# Patient Record
Sex: FEMALE | Race: WHITE | NOT HISPANIC OR LATINO | Employment: OTHER | URBAN - METROPOLITAN AREA
[De-identification: names, ages, dates, MRNs, and addresses within clinical notes are randomized per-mention and may not be internally consistent; named-entity substitution may affect disease eponyms.]

---

## 2017-01-04 DIAGNOSIS — H01.139 EYELID DERMATITIS, ECZEMATOUS, UNSPECIFIED LATERALITY: Primary | ICD-10-CM

## 2017-01-10 RX ORDER — DESONIDE 0.5 MG/G
OINTMENT TOPICAL
Qty: 15 G | Refills: 3 | Status: SHIPPED | OUTPATIENT
Start: 2017-01-10 | End: 2017-03-21

## 2017-01-10 NOTE — TELEPHONE ENCOUNTER
Routing refill request to provider for review/approval because:  Follow up r/t eyelid dermatitis not noted on last office visit, unable to fill per protocol. Please advise.     CORI English, Clinical RN Sheila Clemons.

## 2017-02-01 ENCOUNTER — TELEPHONE (OUTPATIENT)
Dept: FAMILY MEDICINE | Facility: CLINIC | Age: 72
End: 2017-02-01

## 2017-02-01 ENCOUNTER — ANTICOAGULATION THERAPY VISIT (OUTPATIENT)
Dept: NURSING | Facility: CLINIC | Age: 72
End: 2017-02-01
Payer: COMMERCIAL

## 2017-02-01 DIAGNOSIS — I48.0 PAROXYSMAL ATRIAL FIBRILLATION (H): Primary | ICD-10-CM

## 2017-02-01 DIAGNOSIS — Z79.01 LONG-TERM (CURRENT) USE OF ANTICOAGULANTS: Primary | ICD-10-CM

## 2017-02-01 LAB — INR POINT OF CARE: 3 (ref 0.86–1.14)

## 2017-02-01 PROCEDURE — 85610 PROTHROMBIN TIME: CPT | Mod: QW

## 2017-02-01 PROCEDURE — 99207 ZZC NO CHARGE NURSE ONLY: CPT

## 2017-02-01 PROCEDURE — 36416 COLLJ CAPILLARY BLOOD SPEC: CPT

## 2017-02-01 NOTE — TELEPHONE ENCOUNTER
Please review and renew this patients INR referral, orders pending. Thank you!      Cheryl Dyer RN

## 2017-02-01 NOTE — PROGRESS NOTES
ANTICOAGULATION FOLLOW-UP CLINIC VISIT    Patient Name:  Jenn Joseph  Date:  2/1/2017  Contact Type:  Face to Face    SUBJECTIVE:     Patient Findings     Positives No Problem Findings           OBJECTIVE    INR PROTIME   Date Value Ref Range Status   02/01/2017 3.0* 0.86 - 1.14 Final       ASSESSMENT / PLAN  No question data found.  Anticoagulation Summary as of 2/1/2017     INR goal 2.0-3.0   Selected INR 3.0 (2/1/2017)   Maintenance plan 1 mg (1 mg x 1) on Sun, Tue, Thu; 2 mg (1 mg x 2) all other days   Full instructions 1 mg on Sun, Tue, Thu; 2 mg all other days   Weekly total 11 mg   No change documented Cheryl Dyer RN   Plan last modified Cheryl Dyer RN (4/13/2016)   Next INR check 3/15/2017   Target end date     Indications   Long-term (current) use of anticoagulants [Z79.01] [Z79.01]  Atrial fibrillation/flutter (Resolved)         Anticoagulation Episode Summary     INR check location     Preferred lab     Send INR reminders to [facility]    Comments       Anticoagulation Care Providers     Provider Role Specialty Phone number    Nayeli Lee PA-C Responsible Physician Assistant [phone]            See the Encounter Report to view Anticoagulation Flowsheet and Dosing Calendar (Go to Encounters tab in chart review, and find the Anticoagulation Therapy Visit)    Cheryl Dyer RN

## 2017-02-01 NOTE — MR AVS SNAPSHOT
Jenn JP Joseph   2/1/2017 2:00 PM   Anticoagulation Therapy Visit    Description:  71 year old female   Provider:  MIGUEL ANGEL DE LA CRUZ   Department:  [facility]           INR as of 2/1/2017     Selected INR 3.0 (2/1/2017)      Anticoagulation Summary as of 2/1/2017     INR goal 2.0-3.0   Selected INR 3.0 (2/1/2017)   Full instructions 1 mg on Sun, Tue, Thu; 2 mg all other days   Next INR check 3/15/2017    Indications   Long-term (current) use of anticoagulants [Z79.01] [Z79.01]  Atrial fibrillation/flutter (Resolved)         Your next Anticoagulation Clinic appointment(s)     Mar 15, 2017  2:00 PM   Anticoagulation Visit with MIGUEL ANGEL DE LA CRUZ   [facility]    [address] [place] [id number] [phone]              Contact Numbers     [facility]  Please call  [phone] to cancel and/or reschedule your appointment, or with any problems or questions regarding your therapy.        February 2017 Details    Sun Mon Tue Wed Thu Fri Sat        1      2 mg   See details      2      1 mg         3      2 mg         4      2 mg           5      1 mg         6      2 mg         7      1 mg         8      2 mg         9      1 mg         10      2 mg         11      2 mg           12      1 mg         13      2 mg         14      1 mg         15      2 mg         16      1 mg         17      2 mg         18      2 mg           19      1 mg         20      2 mg         21      1 mg         22      2 mg         23      1 mg         24      2 mg         25      2 mg           26      1 mg         27      2 mg         28      1 mg              Date Details   02/01 This INR check               How to take your warfarin dose     To take:  1 mg Take 1 of the 1 mg tablets.    To take:  2 mg Take 2 of the 1 mg tablets.           March 2017 Details    Sun Mon Tue Wed Thu Fri Sat        1      2 mg         2      1 mg         3      2 mg         4      2 mg            5      1 mg         6      2 mg         7      1 mg         8      2 mg         9      1 mg         10      2 mg         11      2 mg           12      1 mg         13      2 mg         14      1 mg         15            16               17               18                 19               20               21               22               23               24               25                 26               27               28               29               30               31                 Date Details   No additional details    Date of next INR:  3/15/2017         How to take your warfarin dose     To take:  1 mg Take 1 of the 1 mg tablets.    To take:  2 mg Take 2 of the 1 mg tablets.

## 2017-03-16 ENCOUNTER — ANTICOAGULATION THERAPY VISIT (OUTPATIENT)
Dept: NURSING | Facility: CLINIC | Age: 72
End: 2017-03-16
Payer: COMMERCIAL

## 2017-03-16 DIAGNOSIS — Z79.01 LONG-TERM (CURRENT) USE OF ANTICOAGULANTS: ICD-10-CM

## 2017-03-16 LAB — INR POINT OF CARE: 2.1 (ref 0.86–1.14)

## 2017-03-16 PROCEDURE — 99207 ZZC NO CHARGE NURSE ONLY: CPT

## 2017-03-16 PROCEDURE — 36416 COLLJ CAPILLARY BLOOD SPEC: CPT

## 2017-03-16 PROCEDURE — 85610 PROTHROMBIN TIME: CPT | Mod: QW

## 2017-03-16 NOTE — PROGRESS NOTES
ANTICOAGULATION FOLLOW-UP CLINIC VISIT    Patient Name:  Jenn Joseph  Date:  3/16/2017  Contact Type:  Face to Face    SUBJECTIVE:     Patient Findings     Positives No Problem Findings           OBJECTIVE    INR Protime   Date Value Ref Range Status   03/16/2017 2.1 (A) 0.86 - 1.14 Final       ASSESSMENT / PLAN  INR assessment THER    Recheck INR In: 6 WEEKS    INR Location Clinic      Anticoagulation Summary as of 3/16/2017     INR goal 2.0-3.0   Today's INR 2.1   Maintenance plan 1 mg (1 mg x 1) on Sun, Tue, Thu; 2 mg (1 mg x 2) all other days   Full instructions 1 mg on Sun, Tue, Thu; 2 mg all other days   Weekly total 11 mg   No change documented Cheryl Dyer RN   Plan last modified Cheryl Dyer RN (4/13/2016)   Next INR check 4/26/2017   Target end date     Indications   Long-term (current) use of anticoagulants [Z79.01] [Z79.01]  Atrial fibrillation/flutter (Resolved)         Anticoagulation Episode Summary     INR check location     Preferred lab     Send INR reminders to [facility]    Comments       Anticoagulation Care Providers     Provider Role Specialty Phone number    Nayeli Lee PA-C Responsible Physician Assistant [phone]            See the Encounter Report to view Anticoagulation Flowsheet and Dosing Calendar (Go to Encounters tab in chart review, and find the Anticoagulation Therapy Visit)    Cheryl Dyer RN

## 2017-03-16 NOTE — MR AVS SNAPSHOT
Jenn JP Jake   3/16/2017 2:00 PM   Anticoagulation Therapy Visit    Description:  72 year old female   Provider:  MIGUEL ANGEL DE LA CRUZ   Department:  [facility]           INR as of 3/16/2017     Today's INR 2.1      Anticoagulation Summary as of 3/16/2017     INR goal 2.0-3.0   Today's INR 2.1   Full instructions 1 mg on Sun, Tue, Thu; 2 mg all other days   Next INR check 4/26/2017    Indications   Long-term (current) use of anticoagulants [Z79.01] [Z79.01]  Atrial fibrillation/flutter (Resolved)         Your next Anticoagulation Clinic appointment(s)     Mar 16, 2017  2:00 PM CDT   Anticoagulation Visit with MIGUEL ANGEL DE LA CRUZ   [facility] ([facility])    [address] [address] [id number] [phone]            Apr 26, 2017  2:00 PM CDT   Anticoagulation Visit with MIGUEL ANGEL DE LA CRUZ   [facility] ([facility])    [address] [facility] [id number] [phone]              Contact Numbers     [facility]  Please call  [phone] to cancel and/or reschedule your appointment, or with any problems or questions regarding your therapy.        March 2017 Details    Sun Mon Tue Wed Thu Fri Sat        1               2               3               4                 5               6               7               8               9               10               11                 12               13               14               15               16      1 mg   See details      17      2 mg         18      2 mg           19      1 mg         20      2 mg         21      1 mg         22      2 mg         23      1 mg         24      2 mg         25      2 mg           26      1 mg         27      2 mg         28      1 mg         29      2 mg         30      1 mg         31      2 mg           Date Details   03/16 This INR check               How to take your warfarin dose     To take:  1 mg Take 1 of the 1 mg  tablets.    To take:  2 mg Take 2 of the 1 mg tablets.           April 2017 Details    Sun Mon Tue Wed Thu Fri Sat           1      2 mg           2      1 mg         3      2 mg         4      1 mg         5      2 mg         6      1 mg         7      2 mg         8      2 mg           9      1 mg         10      2 mg         11      1 mg         12      2 mg         13      1 mg         14      2 mg         15      2 mg           16      1 mg         17      2 mg         18      1 mg         19      2 mg         20      1 mg         21      2 mg         22      2 mg           23      1 mg         24      2 mg         25      1 mg         26            27               28               29                 30                      Date Details   No additional details    Date of next INR:  4/26/2017         How to take your warfarin dose     To take:  1 mg Take 1 of the 1 mg tablets.    To take:  2 mg Take 2 of the 1 mg tablets.

## 2017-03-21 ENCOUNTER — MYC MEDICAL ADVICE (OUTPATIENT)
Dept: FAMILY MEDICINE | Facility: CLINIC | Age: 72
End: 2017-03-21

## 2017-03-21 DIAGNOSIS — Z79.01 LONG-TERM (CURRENT) USE OF ANTICOAGULANTS, INR GOAL 2.0-3.0: ICD-10-CM

## 2017-03-21 DIAGNOSIS — I48.20 CHRONIC ATRIAL FIBRILLATION (H): ICD-10-CM

## 2017-03-21 DIAGNOSIS — H01.139 EYELID DERMATITIS, ECZEMATOUS, UNSPECIFIED LATERALITY: ICD-10-CM

## 2017-03-21 RX ORDER — DESONIDE 0.5 MG/G
OINTMENT TOPICAL
Qty: 15 G | Refills: 3 | Status: SHIPPED | OUTPATIENT
Start: 2017-03-21 | End: 2023-04-21

## 2017-03-21 RX ORDER — WARFARIN SODIUM 1 MG/1
TABLET ORAL
Qty: 140 TABLET | Refills: 1 | Status: SHIPPED | OUTPATIENT
Start: 2017-03-21 | End: 2017-09-05

## 2017-04-26 ENCOUNTER — ANTICOAGULATION THERAPY VISIT (OUTPATIENT)
Dept: NURSING | Facility: CLINIC | Age: 72
End: 2017-04-26
Payer: COMMERCIAL

## 2017-04-26 DIAGNOSIS — Z79.01 LONG-TERM (CURRENT) USE OF ANTICOAGULANTS: ICD-10-CM

## 2017-04-26 LAB — INR POINT OF CARE: 3 (ref 0.86–1.14)

## 2017-04-26 PROCEDURE — 85610 PROTHROMBIN TIME: CPT | Mod: QW

## 2017-04-26 PROCEDURE — 99207 ZZC NO CHARGE NURSE ONLY: CPT

## 2017-04-26 PROCEDURE — 36416 COLLJ CAPILLARY BLOOD SPEC: CPT

## 2017-04-26 NOTE — PROGRESS NOTES
ANTICOAGULATION FOLLOW-UP CLINIC VISIT    Patient Name:  Jenn Joseph  Date:  4/26/2017  Contact Type:  Face to Face    SUBJECTIVE:     Patient Findings     Positives No Problem Findings           OBJECTIVE    INR Protime   Date Value Ref Range Status   04/26/2017 3.0 (A) 0.86 - 1.14 Final       ASSESSMENT / PLAN  INR assessment THER    Recheck INR In: 6 WEEKS    INR Location Clinic      Anticoagulation Summary as of 4/26/2017     INR goal 2.0-3.0   Today's INR 3.0   Maintenance plan 1 mg (1 mg x 1) on Sun, Tue, Thu; 2 mg (1 mg x 2) all other days   Full instructions 1 mg on Sun, Tue, Thu; 2 mg all other days   Weekly total 11 mg   No change documented Cheryl Dyer RN   Plan last modified Cheryl Dyer RN (4/13/2016)   Next INR check 6/7/2017   Target end date     Indications   Long-term (current) use of anticoagulants [Z79.01] [Z79.01]  Atrial fibrillation/flutter (Resolved)         Anticoagulation Episode Summary     INR check location     Preferred lab     Send INR reminders to [facility]    Comments       Anticoagulation Care Providers     Provider Role Specialty Phone number    Nayeli Lee PA-C Responsible Physician Assistant [phone]            See the Encounter Report to view Anticoagulation Flowsheet and Dosing Calendar (Go to Encounters tab in chart review, and find the Anticoagulation Therapy Visit)    Cheryl Dyer RN

## 2017-04-26 NOTE — MR AVS SNAPSHOT
Jenncarmela Joseph   4/26/2017 2:00 PM   Anticoagulation Therapy Visit    Description:  72 year old female   Provider:  MIGUEL ANGEL DE LA CRUZ   Department:  [facility]           INR as of 4/26/2017     Today's INR 3.0      Anticoagulation Summary as of 4/26/2017     INR goal 2.0-3.0   Today's INR 3.0   Full instructions 1 mg on Sun, Tue, Thu; 2 mg all other days   Next INR check 6/7/2017    Indications   Long-term (current) use of anticoagulants [Z79.01] [Z79.01]  Atrial fibrillation/flutter (Resolved)         Your next Anticoagulation Clinic appointment(s)     Apr 26, 2017  2:00 PM CDT   Anticoagulation Visit with MIGUEL ANGEL DE LA CRUZ   [facility] ([facility])    [address] [address] [id number] [phone]            Jun 07, 2017  2:00 PM CDT   Anticoagulation Visit with MIGUEL ANGEL DE LA CRUZ   [facility] ([facility])    [address] [address] [id number] [phone]              Contact Numbers     [facility]  Please call  [phone] to cancel and/or reschedule your appointment, or with any problems or questions regarding your therapy.        April 2017 Details    Sun Mon Tue Wed Thu Fri Sat           1                 2               3               4               5               6               7               8                 9               10               11               12               13               14               15                 16               17               18               19               20               21               22                 23               24               25               26      2 mg   See details      27      1 mg         28      2 mg         29      2 mg           30      1 mg                Date Details   04/26 This INR check               How to take your warfarin dose     To take:  1 mg Take 1 of the 1 mg tablets.    To take:  2 mg Take 2 of the 1 mg  tablets.           May 2017 Details    Sun Mon Tue Wed Thu Fri Sat      1      2 mg         2      1 mg         3      2 mg         4      1 mg         5      2 mg         6      2 mg           7      1 mg         8      2 mg         9      1 mg         10      2 mg         11      1 mg         12      2 mg         13      2 mg           14      1 mg         15      2 mg         16      1 mg         17      2 mg         18      1 mg         19      2 mg         20      2 mg           21      1 mg         22      2 mg         23      1 mg         24      2 mg         25      1 mg         26      2 mg         27      2 mg           28      1 mg         29      2 mg         30      1 mg         31      2 mg             Date Details   No additional details            How to take your warfarin dose     To take:  1 mg Take 1 of the 1 mg tablets.    To take:  2 mg Take 2 of the 1 mg tablets.           June 2017 Details    Sun Mon Tue Wed Thu Fri Sat         1      1 mg         2      2 mg         3      2 mg           4      1 mg         5      2 mg         6      1 mg         7            8               9               10                 11               12               13               14               15               16               17                 18               19               20               21               22               23               24                 25               26               27               28               29               30                 Date Details   No additional details    Date of next INR:  6/7/2017         How to take your warfarin dose     To take:  1 mg Take 1 of the 1 mg tablets.    To take:  2 mg Take 2 of the 1 mg tablets.

## 2017-05-10 ENCOUNTER — TRANSFERRED RECORDS (OUTPATIENT)
Dept: HEALTH INFORMATION MANAGEMENT | Facility: CLINIC | Age: 72
End: 2017-05-10

## 2017-06-08 ENCOUNTER — ANTICOAGULATION THERAPY VISIT (OUTPATIENT)
Dept: NURSING | Facility: CLINIC | Age: 72
End: 2017-06-08
Payer: COMMERCIAL

## 2017-06-08 DIAGNOSIS — Z79.01 LONG-TERM (CURRENT) USE OF ANTICOAGULANTS: ICD-10-CM

## 2017-06-08 LAB — INR POINT OF CARE: 2.2 (ref 0.86–1.14)

## 2017-06-08 PROCEDURE — 36416 COLLJ CAPILLARY BLOOD SPEC: CPT

## 2017-06-08 PROCEDURE — 85610 PROTHROMBIN TIME: CPT | Mod: QW

## 2017-06-08 PROCEDURE — 99207 ZZC NO CHARGE NURSE ONLY: CPT

## 2017-06-08 NOTE — PROGRESS NOTES
ANTICOAGULATION FOLLOW-UP CLINIC VISIT    Patient Name:  Jenn Joseph  Date:  6/8/2017  Contact Type:  Face to Face    SUBJECTIVE:     Patient Findings     Positives No Problem Findings           OBJECTIVE    INR Protime   Date Value Ref Range Status   06/08/2017 2.2 (A) 0.86 - 1.14 Final       ASSESSMENT / PLAN  INR assessment THER    Recheck INR In: 6 WEEKS    INR Location Clinic      Anticoagulation Summary as of 6/8/2017     INR goal 2.0-3.0   Today's INR 2.2   Maintenance plan 1 mg (1 mg x 1) on Sun, Tue, Thu; 2 mg (1 mg x 2) all other days   Full instructions 1 mg on Sun, Tue, Thu; 2 mg all other days   Weekly total 11 mg   No change documented Dennise Myers RN   Plan last modified Cheryl Dyer RN (4/13/2016)   Next INR check 7/19/2017   Target end date     Indications   Long-term (current) use of anticoagulants [Z79.01] [Z79.01]  Atrial fibrillation/flutter (Resolved)         Anticoagulation Episode Summary     INR check location     Preferred lab     Send INR reminders to [facility]    Comments       Anticoagulation Care Providers     Provider Role Specialty Phone number    Nayeli Lee PA-C Responsible Physician Assistant [phone]            See the Encounter Report to view Anticoagulation Flowsheet and Dosing Calendar (Go to Encounters tab in chart review, and find the Anticoagulation Therapy Visit)        Dennise Myers RN

## 2017-06-08 NOTE — MR AVS SNAPSHOT
Jenn TRAMMELL Jake   6/8/2017 9:20 AM   Anticoagulation Therapy Visit    Description:  72 year old female   Provider:  MIGUEL ANGEL DE LA CRUZ   Department:  [facility]           INR as of 6/8/2017     Today's INR 2.2      Anticoagulation Summary as of 6/8/2017     INR goal 2.0-3.0   Today's INR 2.2   Full instructions 1 mg on Sun, Tue, Thu; 2 mg all other days   Next INR check 7/19/2017    Indications   Long-term (current) use of anticoagulants [Z79.01] [Z79.01]  Atrial fibrillation/flutter (Resolved)         Your next Anticoagulation Clinic appointment(s)     Jun 08, 2017  9:20 AM CDT   Anticoagulation Visit with MIGUEL ANGEL DE LA CRUZ   [facility]    [address] [facility] [id number] [phone]            Jul 19, 2017  2:00 PM CDT   Anticoagulation Visit with MIGUEL ANGEL DE LA CRUZ   [facility]    [address] [facility] [phone] [phone]              Contact Numbers     [facility]  Please call  [phone] to cancel and/or reschedule your appointment, or with any problems or questions regarding your therapy.        June 2017 Details    Sun Mon Tue Wed Thu Fri Sat         1               2               3                 4               5               6               7               8      1 mg   See details      9      2 mg         10      2 mg           11      1 mg         12      2 mg         13      1 mg         14      2 mg         15      1 mg         16      2 mg         17      2 mg           18      1 mg         19      2 mg         20      1 mg         21      2 mg         22      1 mg         23      2 mg         24      2 mg           25      1 mg         26      2 mg         27      1 mg         28      2 mg         29      1 mg         30      2 mg           Date Details   06/08 This INR check               How to take your warfarin dose     To take:  1 mg Take 1 of  the 1 mg tablets.    To take:  2 mg Take 2 of the 1 mg tablets.           July 2017 Details    Sun Mon Tue Wed Thu Fri Sat           1      2 mg           2      1 mg         3      2 mg         4      1 mg         5      2 mg         6      1 mg         7      2 mg         8      2 mg           9      1 mg         10      2 mg         11      1 mg         12      2 mg         13      1 mg         14      2 mg         15      2 mg           16      1 mg         17      2 mg         18      1 mg         19            20               21               22                 23               24               25               26               27               28               29                 30               31                     Date Details   No additional details    Date of next INR:  7/19/2017         How to take your warfarin dose     To take:  1 mg Take 1 of the 1 mg tablets.    To take:  2 mg Take 2 of the 1 mg tablets.

## 2017-07-19 ENCOUNTER — ANTICOAGULATION THERAPY VISIT (OUTPATIENT)
Dept: NURSING | Facility: CLINIC | Age: 72
End: 2017-07-19
Payer: COMMERCIAL

## 2017-07-19 DIAGNOSIS — Z79.01 LONG-TERM (CURRENT) USE OF ANTICOAGULANTS: ICD-10-CM

## 2017-07-19 LAB — INR POINT OF CARE: 2.8 (ref 0.86–1.14)

## 2017-07-19 PROCEDURE — 85610 PROTHROMBIN TIME: CPT | Mod: QW

## 2017-07-19 PROCEDURE — 99207 ZZC NO CHARGE NURSE ONLY: CPT

## 2017-07-19 PROCEDURE — 36416 COLLJ CAPILLARY BLOOD SPEC: CPT

## 2017-07-19 NOTE — MR AVS SNAPSHOT
Jenncarmela Joseph   7/19/2017 2:00 PM   Anticoagulation Therapy Visit    Description:  72 year old female   Provider:  MIGUEL ANGEL DE LA CRUZ   Department:  [facility]           INR as of 7/19/2017     Today's INR       Anticoagulation Summary as of 7/19/2017     INR goal 2.0-3.0   Today's INR    Full instructions 1 mg on Sun, Tue, Thu; 2 mg all other days   Next INR check 8/30/2017    Indications   Long-term (current) use of anticoagulants [Z79.01] [Z79.01]  Atrial fibrillation/flutter (Resolved)         Your next Anticoagulation Clinic appointment(s)     Jul 19, 2017  2:00 PM CDT   Anticoagulation Visit with MIGUEL ANGEL DE LA CRUZ   [facility]    [address] [facility] [id number] [phone]            Aug 30, 2017  2:00 PM CDT   Anticoagulation Visit with MIGUEL ANGEL DE LA CRUZ   [facility]    [address] [facility] [id number] [phone]              Contact Numbers     [facility]  Please call  [phone] to cancel and/or reschedule your appointment, or with any problems or questions regarding your therapy.        July 2017 Details    Sun Mon Tue Wed Thu Fri Sat           1                 2               3               4               5               6               7               8                 9               10               11               12               13               14               15                 16               17               18               19      2 mg   See details      20      1 mg         21      2 mg         22      2 mg           23      1 mg         24      2 mg         25      1 mg         26      2 mg         27      1 mg         28      2 mg         29      2 mg           30      1 mg         31      2 mg               Date Details   07/19 This INR check               How to take your warfarin dose     To take:  1 mg Take 1 of the 1 mg tablets.     To take:  2 mg Take 2 of the 1 mg tablets.           August 2017 Details    Sun Mon Tue Wed Thu Fri Sat       1      1 mg         2      2 mg         3      1 mg         4      2 mg         5      2 mg           6      1 mg         7      2 mg         8      1 mg         9      2 mg         10      1 mg         11      2 mg         12      2 mg           13      1 mg         14      2 mg         15      1 mg         16      2 mg         17      1 mg         18      2 mg         19      2 mg           20      1 mg         21      2 mg         22      1 mg         23      2 mg         24      1 mg         25      2 mg         26      2 mg           27      1 mg         28      2 mg         29      1 mg         30            31                  Date Details   No additional details    Date of next INR:  8/30/2017         How to take your warfarin dose     To take:  1 mg Take 1 of the 1 mg tablets.    To take:  2 mg Take 2 of the 1 mg tablets.

## 2017-07-19 NOTE — PROGRESS NOTES
ANTICOAGULATION FOLLOW-UP CLINIC VISIT    Patient Name:  Jenn Joseph  Date:  7/19/2017  Contact Type:  Face to Face    SUBJECTIVE:     Patient Findings     Positives No Problem Findings           OBJECTIVE    INR Protime   Date Value Ref Range Status   06/08/2017 2.2 (A) 0.86 - 1.14 Final       ASSESSMENT / PLAN  INR assessment THER    Recheck INR In: 6 WEEKS    INR Location Clinic      Anticoagulation Summary as of 7/19/2017     INR goal 2.0-3.0   Today's INR    Maintenance plan 1 mg (1 mg x 1) on Sun, Tue, Thu; 2 mg (1 mg x 2) all other days   Full instructions 1 mg on Sun, Tue, Thu; 2 mg all other days   Weekly total 11 mg   No change documented Cheryl Dyer RN   Plan last modified Cheryl Dyer RN (4/13/2016)   Next INR check 8/30/2017   Target end date     Indications   Long-term (current) use of anticoagulants [Z79.01] [Z79.01]  Atrial fibrillation/flutter (Resolved)         Anticoagulation Episode Summary     INR check location     Preferred lab     Send [facility] reminders to  [facility]    Comments       Anticoagulation Care Providers     Provider Role Specialty Phone number    Nayeli Lee PA-C Responsible Physician Assistant [phone]            See the Encounter Report to view Anticoagulation Flowsheet and Dosing Calendar (Go to Encounters tab in chart review, and find the Anticoagulation Therapy Visit)      Cheryl Dyer RN

## 2017-07-31 ENCOUNTER — OFFICE VISIT (OUTPATIENT)
Dept: FAMILY MEDICINE | Facility: CLINIC | Age: 72
End: 2017-07-31
Payer: COMMERCIAL

## 2017-07-31 VITALS
HEART RATE: 61 BPM | DIASTOLIC BLOOD PRESSURE: 68 MMHG | HEIGHT: 62 IN | WEIGHT: 138.4 LBS | BODY MASS INDEX: 25.47 KG/M2 | SYSTOLIC BLOOD PRESSURE: 120 MMHG | TEMPERATURE: 96.5 F | OXYGEN SATURATION: 95 %

## 2017-07-31 DIAGNOSIS — Z79.01 LONG-TERM (CURRENT) USE OF ANTICOAGULANTS, INR GOAL 2.0-3.0: ICD-10-CM

## 2017-07-31 DIAGNOSIS — K21.9 GASTROESOPHAGEAL REFLUX DISEASE WITHOUT ESOPHAGITIS: Primary | ICD-10-CM

## 2017-07-31 DIAGNOSIS — I48.0 PAROXYSMAL ATRIAL FIBRILLATION (H): ICD-10-CM

## 2017-07-31 PROCEDURE — 99213 OFFICE O/P EST LOW 20 MIN: CPT | Performed by: PHYSICIAN ASSISTANT

## 2017-07-31 ASSESSMENT — PAIN SCALES - GENERAL: PAINLEVEL: NO PAIN (0)

## 2017-07-31 NOTE — MR AVS SNAPSHOT
After Visit Summary   7/31/2017    Jenn Joseph    MRN: [id number]           Patient Information     Date Of Birth          1945        Visit Information        Provider Department      7/31/2017 10:20 AM Nayeli Lee PA-C [facility]        Today's Diagnoses     Gastroesophageal reflux disease without esophagitis    -  1    Long-term (current) use of anticoagulants, INR goal 2.0-3.0        Paroxysmal atrial fibrillation (H)           Follow-ups after your visit        Your next 10 appointments already scheduled     Aug 30, 2017  2:00 PM CDT   Anticoagulation Visit with TERESITA DE LA CRUZ   [facility] ([facility])    [address] [place] [id number] [phone]              Who to contact     If you have questions or need follow up information about today's clinic visit or your schedule please contact [facility] directly at [phone].  Normal or non-critical lab and imaging results will be communicated to you by [facility], letter or phone within 4 business days after the clinic has received the results. If you do not hear from us within 7 days, please contact the clinic through [employer] or phone. If you have a critical or abnormal lab result, we will notify you by phone as soon as possible.  Submit refill requests through [employer] or call your pharmacy and they will forward the refill request to us. Please allow 3 business days for your refill to be completed.          Additional Information About Your Visit        MyChart Information     [employer] gives you secure access to your electronic health record. If you see a primary care provider, you can also send messages to your care team and make appointments. If you have questions, please call your primary care clinic.  If you do not have a primary care provider, please call [phone] and they will assist you.        Care EveryWhere ID      "This is your Care EveryWhere ID. This could be used by other organizations to access your [facility] medical records  [id number]        Your Vitals Were     Pulse Temperature Height Pulse Oximetry Breastfeeding? BMI (Body Mass Index)    61 96.5  F (35.8  C) (Oral) 5' 2.44\" (1.586 m) 95% No 24.96 kg/m2       Blood Pressure from Last 3 Encounters:   07/31/17 120/68   09/19/16 138/80   12/14/15 119/77    Weight from Last 3 Encounters:   07/31/17 138 lb 6.4 oz (62.8 kg)   09/19/16 142 lb (64.4 kg)   12/23/15 145 lb 8 oz (66 kg)              Today, you had the following     No orders found for display       Primary Care Provider Office Phone # Fax #    Nayeli Lee PA-C [phone]       [facility] [address]  [facility] [address]        Equal Access to Services     JUAN CARLOS FORTUNE : Hadii aad ku hadasho Soomaali, waaxda luqadaha, qaybta kaalmada adeegyada, waxay idiin hayaan audrey kharash agusto . So [facility] [phone].    ATENCIÓN: Si habla español, tiene a williamson disposición servicios gratuitos de asistencia lingüística. [facility] [phone].    We comply with applicable federal civil rights laws and [place] laws. We do not discriminate on the basis of race, color, national origin, age, disability sex, sexual orientation or gender identity.            Thank you!     Thank you for choosing [facility]  for your care. Our goal is always to provide you with excellent care. Hearing back from our patients is one way we can continue to improve our services. Please take a few minutes to complete the written survey that you may receive in the mail after your visit with us. Thank you!             Your Updated Medication List - Protect others around you: Learn how to safely use, store and throw away your medicines at www.disposemymeds.org.          This list is accurate as of: 7/31/17 11:00 AM.  Always use your most recent med list.                   Brand Name Dispense " Instructions for use Diagnosis    atenolol 12.5 mg Tabs half-tab    TENORMIN     Take 12.5 mg by mouth daily. Half tablet daily    Chronic atrial fibrillation (H)       Calcium-Magnesium-Zinc 333-133-5 MG Tabs per tablet      Take  by mouth. 3-4 daily        Co Q-10 200 MG Caps     90 capsule    Take 200 mg by mouth daily    Routine general medical examination at a health care facility       desonide 0.05 % ointment    DESOWEN    15 g    Apply small amount twice daily to the eyelids for up to two weeks at a time. Take caution to not get it in the eyes.    Eyelid dermatitis, eczematous, unspecified laterality       lisinopril 5 MG tablet    PRINIVIL/ZESTRIL     1 TABLET DAILY        loratadine 10 MG capsule      Take 10 mg by mouth as needed.        MULTIVITAL PO           order for DME     1 each    Equipment being ordered: SP boot walker.    Left foot pain       PRAVACHOL PO      Take 20 mg by mouth daily    Chronic atrial fibrillation (H)       probiotic Caps      Take  by mouth. 4 times weekly        ranitidine 150 MG tablet    ZANTAC    180 tablet    Take 1 tablet (150 mg) by mouth 2 times daily as needed for heartburn    Gastroesophageal reflux disease with esophagitis       RYTHMOL  MG 12 hr capsule   Generic drug:  propafenone      Take 425 mg by mouth 2 times daily.        TUMS PO      Take  by mouth. prn        vitamin D 1000 UNITS capsule      Take 1 capsule by mouth daily.        warfarin 1 MG tablet    COUMADIN    140 tablet    Take 1 tablet (1mg) Tuesdays Thursdays Sundays and take 2 tablets (2 mg) other 4 days of week or as directed by [facility] nurse    Long-term (current) use of anticoagulants, INR goal 2.0-3.0, Chronic atrial fibrillation (H)

## 2017-07-31 NOTE — NURSING NOTE
"Chief Complaint   Patient presents with     Consult       Initial /77 (BP Location: Left arm, Patient Position: Sitting, Cuff Size: Adult Regular)  Pulse 61  Temp 96.5  F (35.8  C) (Oral)  Ht 5' 2.44\" (1.586 m)  Wt 138 lb 6.4 oz (62.8 kg)  SpO2 95%  Breastfeeding? No  BMI 24.96 kg/m2 Estimated body mass index is 24.96 kg/(m^2) as calculated from the following:    Height as of this encounter: 5' 2.44\" (1.586 m).    Weight as of this encounter: 138 lb 6.4 oz (62.8 kg).  Medication Reconciliation: leisa LOPEZ    "

## 2017-07-31 NOTE — PROGRESS NOTES
SUBJECTIVE:                                                    Jenn Joseph is a 72 year old female who presents to clinic today for the following health issues:      Consultation (to establish care with a new provider as PCP will be transferring soon).     Med recheck:    GERD:  Stable on zantac.     A fib:  Stable and managed by cardiology and INR clinic.     HTN:  At goal.    Advanced care directive:  On file and no changes desired at this time.         Problem list and histories reviewed & adjusted, as indicated.  Additional history: as documented    Patient Active Problem List   Diagnosis     GERD (gastroesophageal reflux disease)     CARDIOVASCULAR SCREENING; LDL GOAL LESS THAN 130     Hypertension goal BP (blood pressure) < 140/90     Long-term (current) use of anticoagulants, INR goal 2.0-3.0     Advanced directives, counseling/discussion     CKD (chronic kidney disease) stage 3, GFR 30-59 ml/min     Health Care Home     Seborrhea     Eyelid dermatitis, eczematous     Uterine prolapse     Paroxysmal atrial fibrillation (H)     Past Surgical History:   Procedure Laterality Date     D & C  80'S     HYSTERECTOMY  11-5-15     ROTATOR CUFF REPAIR RT/LT  2007    LEFT     TUBAL LIGATION  80'S       Social History   Substance Use Topics     Smoking status: Former Smoker     Smokeless tobacco: Never Used     Alcohol use Yes      Comment: 1 - 2 PER WEEK     Family History   Problem Relation Age of Onset     Hypertension Mother      CEREBROVASCULAR DISEASE Mother      Thyroid Disease Mother      CEREBROVASCULAR DISEASE Maternal Grandfather      Hypertension Brother      Musculoskeletal Disorder Brother      FIBROMYALGIA     HEART DISEASE Brother      Cardiovascular Brother      ATRIAL FIB     Musculoskeletal Disorder Sister      FIBROMYALGIA     Thyroid Disease Sister      Allergies Son      HEART DISEASE Brother      Cardiovascular Brother      HEART DISEASE Brother      Cardiovascular Brother      HEART  DISEASE Brother      Cardiovascular Brother      HEART DISEASE Father      Cardiovascular Brother      ATRIAL FIB     Cardiovascular Brother      ATRIAL FIB     Cardiovascular Brother      ATRIAL FIB     Cardiovascular Brother      ATRIAL FIB         Current Outpatient Prescriptions   Medication Sig Dispense Refill     desonide (DESOWEN) 0.05 % ointment Apply small amount twice daily to the eyelids for up to two weeks at a time. Take caution to not get it in the eyes. 15 g 3     warfarin (COUMADIN) 1 MG tablet Take 1 tablet (1mg) Tuesdays Thursdays Sundays and take 2 tablets (2 mg) other 4 days of week or as directed by [facility] nurse 140 tablet 1     Pravastatin Sodium (PRAVACHOL PO) Take 20 mg by mouth daily       ranitidine (ZANTAC) 150 MG tablet Take 1 tablet (150 mg) by mouth 2 times daily as needed for heartburn 180 tablet 3     Multiple Vitamins-Minerals (MULTIVITAL PO)        Coenzyme Q10 (CO Q-10) 200 MG CAPS Take 200 mg by mouth daily 90 capsule 3     Cholecalciferol (VITAMIN D) 1000 UNITS capsule Take 1 capsule by mouth daily.       probiotic CAPS Take  by mouth. 4 times weekly       atenolol (TENORMIN) 12.5 MG TABS Take 12.5 mg by mouth daily. Half tablet daily       Calcium-Magnesium-Zinc 333-133-5 MG TABS Take  by mouth. 3-4 daily       Calcium Carbonate Antacid (TUMS PO) Take  by mouth. prn       propafenone (RYTHMOL SR) 425 MG 12 hr capsule Take 425 mg by mouth 2 times daily.       LISINOPRIL 5 MG OR TABS 1 TABLET DAILY       order for DME Equipment being ordered: SP boot walker. 1 each 0     Loratadine 10 MG capsule Take 10 mg by mouth as needed.       BP Readings from Last 3 Encounters:   07/31/17 120/68   09/19/16 138/80   12/14/15 119/77    Wt Readings from Last 3 Encounters:   07/31/17 138 lb 6.4 oz (62.8 kg)   09/19/16 142 lb (64.4 kg)   12/23/15 145 lb 8 oz (66 kg)                        Reviewed and updated as needed this visit by clinical staffTobacco  Allergies       Reviewed and updated as  "needed this visit by Provider         ROS:  Constitutional, HEENT, cardiovascular, pulmonary, gi and gu systems are negative, except as otherwise noted.      OBJECTIVE:   /68  Pulse 61  Temp 96.5  F (35.8  C) (Oral)  Ht 5' 2.44\" (1.586 m)  Wt 138 lb 6.4 oz (62.8 kg)  SpO2 95%  Breastfeeding? No  BMI 24.96 kg/m2  Body mass index is 24.96 kg/(m^2).  GENERAL: healthy, alert and no distress    Diagnostic Test Results:  none     ASSESSMENT/PLAN:         1. Gastroesophageal reflux disease without esophagitis  Stable on zantac.     2. Long-term (current) use of anticoagulants, INR goal 2.0-3.0  Managed by INR clinic    3. Paroxysmal atrial fibrillation (H)  Managed by cardiologist.       FUTURE APPOINTMENTS:       - Follow-up visit in 1-2 months for her physical (will establish care with a new provider at that time).     Nayeli Lee PA-C  [facility]  "

## 2017-09-05 DIAGNOSIS — I48.20 CHRONIC ATRIAL FIBRILLATION (H): ICD-10-CM

## 2017-09-05 DIAGNOSIS — Z79.01 LONG-TERM (CURRENT) USE OF ANTICOAGULANTS, INR GOAL 2.0-3.0: ICD-10-CM

## 2017-09-06 ENCOUNTER — ANTICOAGULATION THERAPY VISIT (OUTPATIENT)
Dept: NURSING | Facility: CLINIC | Age: 72
End: 2017-09-06
Payer: COMMERCIAL

## 2017-09-06 LAB — INR POINT OF CARE: 2.7 (ref 0.86–1.14)

## 2017-09-06 PROCEDURE — 36416 COLLJ CAPILLARY BLOOD SPEC: CPT

## 2017-09-06 PROCEDURE — 85610 PROTHROMBIN TIME: CPT | Mod: QW

## 2017-09-06 PROCEDURE — 99207 ZZC NO CHARGE NURSE ONLY: CPT

## 2017-09-06 RX ORDER — WARFARIN SODIUM 1 MG/1
TABLET ORAL
Qty: 140 TABLET | Refills: 0 | Status: SHIPPED | OUTPATIENT
Start: 2017-09-06 | End: 2017-12-11

## 2017-09-06 NOTE — PROGRESS NOTES
ANTICOAGULATION FOLLOW-UP CLINIC VISIT    Patient Name:  Jenn Joseph  Date:  9/6/2017  Contact Type:  Face to Face    SUBJECTIVE:     Patient Findings     Positives No Problem Findings           OBJECTIVE    INR Protime   Date Value Ref Range Status   09/06/2017 2.7 (A) 0.86 - 1.14 Final       ASSESSMENT / PLAN  INR assessment THER    Recheck INR In: 6 WEEKS    INR Location Clinic      Anticoagulation Summary as of 9/6/2017     INR goal 2.0-3.0   Today's INR 2.7   Maintenance plan 1 mg (1 mg x 1) on Sun, Tue, Thu; 2 mg (1 mg x 2) all other days   Full instructions 1 mg on Sun, Tue, Thu; 2 mg all other days   Weekly total 11 mg   No change documented Kim Zhang RN   Plan last modified Cheryl Dyer RN (4/13/2016)   Next INR check 10/18/2017   Target end date     Indications   Long-term (current) use of anticoagulants [Z79.01] (Resolved) [Z79.01]  Atrial fibrillation/flutter (Resolved)         Anticoagulation Episode Summary     INR check location     Preferred lab     Send INR reminders to  [facility]    Comments       Anticoagulation Care Providers     Provider Role Specialty Phone number    J LuisNayeli ott PA-C Responsible Physician Assistant [phone]            See the Encounter Report to view Anticoagulation Flowsheet and Dosing Calendar (Go to Encounters tab in chart review, and find the Anticoagulation Therapy Visit)        Kim Zhang, RD

## 2017-09-06 NOTE — TELEPHONE ENCOUNTER
Prescription approved per [facility] Refill Protocol.      Kim Zhang RN, [facility]

## 2017-09-06 NOTE — TELEPHONE ENCOUNTER
warfarin     Last Written Prescription Date: 3/21/17  Last Fill Qty: 140, # refills: 1  Last Office Visit with [facility] [place], P or [facility] prescribing provider: 7/31/17       Date and Result of Last PT/INR:   Lab Results   Component Value Date    INR 2.8 07/19/2017    INR 2.2 06/08/2017    INR 1.80 11/13/2009

## 2017-10-05 ENCOUNTER — DOCUMENTATION ONLY (OUTPATIENT)
Dept: LAB | Facility: CLINIC | Age: 72
End: 2017-10-05

## 2017-10-05 DIAGNOSIS — I10 HYPERTENSION GOAL BP (BLOOD PRESSURE) < 140/90: Primary | ICD-10-CM

## 2017-10-05 NOTE — PROGRESS NOTES
This patient has a lab only appointment on 10/12/2017 but does not have future orders. Please review, associate diagnosis and sign pending lab orders for the upcoming appointment.  She has an appointment with you on 10/17/2017.    Thank you,    [facility]

## 2017-10-12 DIAGNOSIS — I10 HYPERTENSION GOAL BP (BLOOD PRESSURE) < 140/90: ICD-10-CM

## 2017-10-12 LAB
ANION GAP SERPL CALCULATED.3IONS-SCNC: 7 MMOL/L (ref 3–14)
BUN SERPL-MCNC: 15 MG/DL (ref 7–30)
CALCIUM SERPL-MCNC: 9.6 MG/DL (ref 8.5–10.1)
CHLORIDE SERPL-SCNC: 105 MMOL/L (ref 94–109)
CHOLEST SERPL-MCNC: 221 MG/DL
CO2 SERPL-SCNC: 28 MMOL/L (ref 20–32)
CREAT SERPL-MCNC: 1.06 MG/DL (ref 0.52–1.04)
CREAT UR-MCNC: 42 MG/DL
GFR SERPL CREATININE-BSD FRML MDRD: 51 ML/MIN/1.7M2
GLUCOSE SERPL-MCNC: 94 MG/DL (ref 70–99)
HDLC SERPL-MCNC: 67 MG/DL
LDLC SERPL CALC-MCNC: 126 MG/DL
MICROALBUMIN UR-MCNC: <5 MG/L
MICROALBUMIN/CREAT UR: NORMAL MG/G CR (ref 0–25)
NONHDLC SERPL-MCNC: 154 MG/DL
POTASSIUM SERPL-SCNC: 5.1 MMOL/L (ref 3.4–5.3)
SODIUM SERPL-SCNC: 140 MMOL/L (ref 133–144)
TRIGL SERPL-MCNC: 142 MG/DL

## 2017-10-12 PROCEDURE — 36415 COLL VENOUS BLD VENIPUNCTURE: CPT | Performed by: PHYSICIAN ASSISTANT

## 2017-10-12 PROCEDURE — 82043 UR ALBUMIN QUANTITATIVE: CPT | Performed by: PHYSICIAN ASSISTANT

## 2017-10-12 PROCEDURE — 80061 LIPID PANEL: CPT | Performed by: PHYSICIAN ASSISTANT

## 2017-10-12 PROCEDURE — 80048 BASIC METABOLIC PNL TOTAL CA: CPT | Performed by: PHYSICIAN ASSISTANT

## 2017-10-17 ENCOUNTER — OFFICE VISIT (OUTPATIENT)
Dept: FAMILY MEDICINE | Facility: CLINIC | Age: 72
End: 2017-10-17
Payer: COMMERCIAL

## 2017-10-17 ENCOUNTER — MYC MEDICAL ADVICE (OUTPATIENT)
Dept: FAMILY MEDICINE | Facility: CLINIC | Age: 72
End: 2017-10-17

## 2017-10-17 VITALS
DIASTOLIC BLOOD PRESSURE: 80 MMHG | OXYGEN SATURATION: 97 % | SYSTOLIC BLOOD PRESSURE: 132 MMHG | HEIGHT: 63 IN | BODY MASS INDEX: 25.16 KG/M2 | TEMPERATURE: 97.6 F | WEIGHT: 142 LBS | HEART RATE: 60 BPM

## 2017-10-17 DIAGNOSIS — E78.00 PURE HYPERCHOLESTEROLEMIA: ICD-10-CM

## 2017-10-17 DIAGNOSIS — Z00.00 ROUTINE HISTORY AND PHYSICAL EXAMINATION OF ADULT: Primary | ICD-10-CM

## 2017-10-17 DIAGNOSIS — I48.0 PAROXYSMAL ATRIAL FIBRILLATION (H): ICD-10-CM

## 2017-10-17 DIAGNOSIS — I10 HYPERTENSION GOAL BP (BLOOD PRESSURE) < 140/90: ICD-10-CM

## 2017-10-17 DIAGNOSIS — K21.00 GASTROESOPHAGEAL REFLUX DISEASE WITH ESOPHAGITIS: ICD-10-CM

## 2017-10-17 DIAGNOSIS — Z12.11 SCREEN FOR COLON CANCER: ICD-10-CM

## 2017-10-17 PROCEDURE — 99397 PER PM REEVAL EST PAT 65+ YR: CPT | Performed by: PHYSICIAN ASSISTANT

## 2017-10-17 RX ORDER — ATENOLOL 25 MG/1
12.5 TABLET ORAL DAILY
Qty: 45 TABLET | Refills: 3 | Status: SHIPPED | OUTPATIENT
Start: 2017-10-17 | End: 2018-09-17

## 2017-10-17 RX ORDER — PRAVASTATIN SODIUM 20 MG
20 TABLET ORAL DAILY
Qty: 90 TABLET | Refills: 3 | Status: SHIPPED | OUTPATIENT
Start: 2017-10-17 | End: 2018-09-17

## 2017-10-17 RX ORDER — PROPAFENONE HYDROCHLORIDE 425 MG/1
425 CAPSULE, EXTENDED RELEASE ORAL 2 TIMES DAILY
Qty: 180 CAPSULE | Refills: 3 | Status: SHIPPED | OUTPATIENT
Start: 2017-10-17 | End: 2018-09-17

## 2017-10-17 RX ORDER — LISINOPRIL 5 MG/1
5 TABLET ORAL DAILY
Qty: 90 TABLET | Refills: 3 | Status: SHIPPED | OUTPATIENT
Start: 2017-10-17 | End: 2018-09-17

## 2017-10-17 ASSESSMENT — PAIN SCALES - GENERAL: PAINLEVEL: NO PAIN (0)

## 2017-10-17 NOTE — MR AVS SNAPSHOT
After Visit Summary   10/17/2017    Jenn Joseph    MRN: [id number]           Patient Information     Date Of Birth          1945        Visit Information        Provider Department      10/17/2017 9:40 AM Taylor Mehta PA-C [facility]        Today's Diagnoses     Hypertension goal BP (blood pressure) < 140/90    -  1    Screen for colon cancer        At risk for falling        Need for prophylactic vaccination and inoculation against influenza        Chronic atrial fibrillation (H)          Care Instructions      Preventive Health Recommendations  Female Ages 65 +    Yearly exam:     See your health care provider every year in order to  o Review health changes.   o Discuss preventive care.    o Review your medicines if your doctor has prescribed any.      You no longer need a yearly Pap test unless you've had an abnormal Pap test in the past 10 years. If you have vaginal symptoms, such as bleeding or discharge, be sure to talk with your provider about a Pap test.      Every 1 to 2 years, have a mammogram.  If you are over 69, talk with your health care provider about whether or not you want to continue having screening mammograms.      Every 10 years, have a colonoscopy. Or, have a yearly FIT test (stool test). These exams will check for colon cancer.       Have a cholesterol test every 5 years, or more often if your doctor advises it.       Have a diabetes test (fasting glucose) every three years. If you are at risk for diabetes, you should have this test more often.       At age 65, have a bone density scan (DEXA) to check for osteoporosis (brittle bone disease).    Shots:    Get a flu shot each year.    Get a tetanus shot every 10 years.    Talk to your doctor about your pneumonia vaccines. There are now two you should receive - Pneumovax (PPSV 23) and Prevnar (PCV 13).    Talk to your doctor about the shingles vaccine.    Talk to your doctor about  the hepatitis B vaccine.    Nutrition:     Eat at least 5 servings of fruits and vegetables each day.      Eat whole-grain bread, whole-wheat pasta and brown rice instead of white grains and rice.      Talk to your provider about Calcium and Vitamin D.     Lifestyle    Exercise at least 150 minutes a week (30 minutes a day, 5 days a week). This will help you control your weight and prevent disease.      Limit alcohol to one drink per day.      No smoking.       Wear sunscreen to prevent skin cancer.       See your dentist twice a year for an exam and cleaning.      See your eye doctor every 1 to 2 years to screen for conditions such as glaucoma, macular degeneration, cataracts, etc           Follow-ups after your visit        Your next 10 appointments already scheduled     Oct 18, 2017  2:00 PM CDT   Anticoagulation Visit with TERESITA DE LA CRUZ   [facility]    [address] [place] [id number] [phone]              Future tests that were ordered for you today     Open Future Orders        Priority Expected Expires Ordered    Fecal colorectal cancer screen FIT - Future (S+30) Routine 11/7/2017 11/16/2017 10/17/2017            Who to contact     If you have questions or need follow up information about today's clinic visit or your schedule please contact [facility] directly at [phone].  Normal or non-critical lab and imaging results will be communicated to you by MyChart, letter or phone within 4 business days after the clinic has received the results. If you do not hear from us within 7 days, please contact the clinic through MyChart or phone. If you have a critical or abnormal lab result, we will notify you by phone as soon as possible.  Submit refill requests through [employer] or call your pharmacy and they will forward the refill request to us. Please allow 3 business days for your refill to be completed.           "Additional Information About Your Visit        MyChart Information     [employer] gives you secure access to your electronic health record. If you see a primary care provider, you can also send messages to your care team and make appointments. If you have questions, please call your primary care clinic.  If you do not have a primary care provider, please call [phone] and they will assist you.        Care EveryWhere ID     This is your Care EveryWhere ID. This could be used by other organizations to access your [facility] medical records  [id number]        Your Vitals Were     Pulse Temperature Height Pulse Oximetry Breastfeeding? BMI (Body Mass Index)    60 97.6  F (36.4  C) (Oral) 5' 2.5\" (1.588 m) 97% No 25.56 kg/m2       Blood Pressure from Last 3 Encounters:   10/17/17 132/80   07/31/17 120/68   09/19/16 138/80    Weight from Last 3 Encounters:   10/17/17 142 lb (64.4 kg)   07/31/17 138 lb 6.4 oz (62.8 kg)   09/19/16 142 lb (64.4 kg)              We Performed the Following     PAF COMPLETED          Today's Medication Changes          These changes are accurate as of: 10/17/17 10:21 AM.  If you have any questions, ask your nurse or doctor.               These medicines have changed or have updated prescriptions.        Dose/Directions    * atenolol 25 MG tablet   Commonly known as:  TENORMIN   This may have changed:  You were already taking a medication with the same name, and this prescription was added. Make sure you understand how and when to take each.   Used for:  Chronic atrial fibrillation (H), Hypertension goal BP (blood pressure) < 140/90   Changed by:  Taylor Mehta PA-C        Dose:  12.5 mg   Take 0.5 tablets (12.5 mg) by mouth daily   Quantity:  45 tablet   Refills:  3       * atenolol 12.5 mg Tabs half-tab   Commonly known as:  TENORMIN   This may have changed:  Another medication with the same name was added. Make sure you understand how and when to take each.   Used for:  " Chronic atrial fibrillation (H)   Changed by:  Nayeli Lee PA-C        Dose:  12.5 mg   Take 12.5 mg by mouth daily. Half tablet daily   Refills:  0       desonide 0.05 % ointment   Commonly known as:  DESOWEN   This may have changed:    - when to take this  - reasons to take this  - additional instructions   Used for:  Eyelid dermatitis, eczematous, unspecified laterality        Apply small amount twice daily to the eyelids for up to two weeks at a time. Take caution to not get it in the eyes.   Quantity:  15 g   Refills:  3       lisinopril 5 MG tablet   Commonly known as:  PRINIVIL/ZESTRIL   This may have changed:  See the new instructions.   Used for:  Chronic atrial fibrillation (H), Hypertension goal BP (blood pressure) < 140/90   Changed by:  Taylor Mehta PA-C        Dose:  5 mg   Take 1 tablet (5 mg) by mouth daily   Quantity:  90 tablet   Refills:  3       pravastatin 20 MG tablet   Commonly known as:  PRAVACHOL   This may have changed:  medication strength   Used for:  Chronic atrial fibrillation (H)   Changed by:  Taylor Mehta PA-C        Dose:  20 mg   Take 1 tablet (20 mg) by mouth daily   Quantity:  90 tablet   Refills:  3       * Notice:  This list has 2 medication(s) that are the same as other medications prescribed for you. Read the directions carefully, and ask your doctor or other care provider to review them with you.      Stop taking these medicines if you haven't already. Please contact your care team if you have questions.     loratadine 10 MG capsule   Stopped by:  Taylor Mehta PA-C           ranitidine 150 MG tablet   Commonly known as:  ZANTAC   Stopped by:  Taylor Mehta PA-C                Where to get your medicines      These medications were sent to [id number] - [place], [place] - [address]  [address] [address]     Phone:  [phone]     atenolol 25 MG tablet     lisinopril 5 MG tablet    pravastatin 20 MG tablet    propafenone 425 MG 12 hr capsule                Primary Care Provider Office Phone # Fax #    Nayeli Alissa Lee PA-C [phone]       [address]  [facility] [address]        Equal Access to Services     JUAN CARLOS FORTUNE : Hadii aad ku hadasho Soomaali, waaxda luqadaha, qaybta kaalmada adeegyada, waxay idiin hayaan adeeg khreneesh lanikhil vee. So [facility] [phone].    ATENCIÓN: Si habla español, tiene a williamson disposición servicios gratuitos de asistencia lingüística. [facility] [phone].    We comply with applicable federal civil rights laws and [place] laws. We do not discriminate on the basis of race, color, national origin, age, disability, sex, sexual orientation, or gender identity.            Thank you!     Thank you for choosing [facility]  for your care. Our goal is always to provide you with excellent care. Hearing back from our patients is one way we can continue to improve our services. Please take a few minutes to complete the written survey that you may receive in the mail after your visit with us. Thank you!             Your Updated Medication List - Protect others around you: Learn how to safely use, store and throw away your medicines at www.disposemymeds.org.          This list is accurate as of: 10/17/17 10:21 AM.  Always use your most recent med list.                   Brand Name Dispense Instructions for use Diagnosis    * atenolol 25 MG tablet    TENORMIN    45 tablet    Take 0.5 tablets (12.5 mg) by mouth daily    Chronic atrial fibrillation (H), Hypertension goal BP (blood pressure) < 140/90       * atenolol 12.5 mg Tabs half-tab    TENORMIN     Take 12.5 mg by mouth daily. Half tablet daily    Chronic atrial fibrillation (H)       Calcium-Magnesium-Zinc 333-133-5 MG Tabs per tablet      Take by mouth 2 times daily        Co Q-10 200 MG Caps     90 capsule    Take 200 mg by mouth daily    Routine general  medical examination at a health care facility       desonide 0.05 % ointment    DESOWEN    15 g    Apply small amount twice daily to the eyelids for up to two weeks at a time. Take caution to not get it in the eyes.    Eyelid dermatitis, eczematous, unspecified laterality       lisinopril 5 MG tablet    PRINIVIL/ZESTRIL    90 tablet    Take 1 tablet (5 mg) by mouth daily    Chronic atrial fibrillation (H), Hypertension goal BP (blood pressure) < 140/90       MULTIVITAL PO           order for DME     1 each    Equipment being ordered: SP boot walker.    Left foot pain       pravastatin 20 MG tablet    PRAVACHOL    90 tablet    Take 1 tablet (20 mg) by mouth daily    Chronic atrial fibrillation (H)       probiotic Caps      Take  by mouth. 4 times weekly        propafenone 425 MG 12 hr capsule    RYTHMOL SR    180 capsule    Take 1 capsule (425 mg) by mouth 2 times daily    Chronic atrial fibrillation (H), Hypertension goal BP (blood pressure) < 140/90       TUMS PO      Take  by mouth. prn        vitamin D 1000 UNITS capsule      Take 1 capsule by mouth daily.        warfarin 1 MG tablet    COUMADIN    140 tablet    Take 1 tablet (1 mg) by mouth on Tuesdays, Thursdays, and Sundays, and take 2 tablets (2 mg) by mouth on the other 4 days of the week or as    Long-term (current) use of anticoagulants, INR goal 2.0-3.0, Chronic atrial fibrillation (H)       * Notice:  This list has 2 medication(s) that are the same as other medications prescribed for you. Read the directions carefully, and ask your doctor or other care provider to review them with you.

## 2017-10-17 NOTE — PROGRESS NOTES
SUBJECTIVE:   Jenn Joseph is a 72 year old female who presents for Preventive Visit.  Are you in the first 12 months of your Medicare Part B coverage?  No    Healthy Habits:    Do you get at least three servings of calcium containing foods daily (dairy, green leafy vegetables, etc.)? Yes and supplements    Amount of exercise or daily activities, outside of work: 5-6 day(s) per week treadmill at home    Problems taking medications regularly No    Medication side effects: No    Have you had an eye exam in the past two years? yes    Do you see a dentist twice per year? yes    Do you have sleep apnea, excessive snoring or daytime drowsiness?no    COGNITIVE SCREEN  1) Repeat 3 items (Banana, Sunrise, Chair)    2) Clock draw: NORMAL  3) 3 item recall: Recalls 3 objects  Results: 3 items recalled: COGNITIVE IMPAIRMENT LESS LIKELY    Mini-CogTM Copyright S [employer]. Licensed by the author for use in [facility] [employer]; reprinted with permission (jaz@[facility]). All rights reserved.          Hyperlipidemia Follow-Up      Rate your low fat/cholesterol diet?: good    Taking statin?  Yes, no muscle aches from statin    Other lipid medications/supplements?:  none    Hypertension Follow-up      Outpatient blood pressures are being checked at home.  Results are < 140/90.    Low Salt Diet: no added salt      Reviewed and updated as needed this visit by clinical staffTobacco  Allergies  Meds  Med Hx  Surg Hx  Fam Hx  Soc Hx            Social History   Substance Use Topics     Smoking status: Former Smoker     Smokeless tobacco: Never Used     Alcohol use Yes      Comment: 1 - 2 PER WEEK       The patient does not drink >3 drinks per day nor >7 drinks per week.    Today's PHQ-2 Score:   PHQ-2 ( 1999 Pfizer) 10/17/2017 9/19/2016   Q1: Little interest or pleasure in doing things 0 0   Q2: Feeling down, depressed or hopeless 0 0   PHQ-2 Score 0 0   Q1: Little interest or pleasure in doing things - -   Q2: Feeling down,  depressed or hopeless - -   PHQ-2 Score - -         Do you feel safe in your environment - Yes    Do you have a Health Care Directive?: Yes: Advance Directive has been received and scanned.    Current providers sharing in care for this patient include: Patient Care Team:  Nayeli Lee PA-C as PCP - General (Physician Assistant)      Hearing impairment: No    Ability to successfully perform activities of daily living: Yes, no assistance needed     Fall risk:  Fallen 2 or more times in the past year?: Yes  Any fall with injury in the past year?: Yes  Timed Up and Go Test (>13.5 is fall risk; contact physician) : 7      Home safety:  none identified      The following health maintenance items are reviewed in Epic and correct as of today:  Health Maintenance   Topic Date Due     OP ANNUAL INR REFERRAL  01/20/2017     INFLUENZA VACCINE (SYSTEM ASSIGNED)  09/01/2017     FIT Q1 YR  09/14/2017     FALL RISK ASSESSMENT  09/19/2017     BMP Q1 YR  10/12/2018     LIPID MONITORING Q1 YEAR  10/12/2018     MICROALBUMIN Q1 YEAR  10/12/2018     MAMMO SCREEN Q2 YR (SYSTEM ASSIGNED)  05/10/2019     ADVANCE DIRECTIVE PLANNING Q5 YRS  07/31/2022     TETANUS IMMUNIZATION (SYSTEM ASSIGNED)  10/03/2022     DEXA SCAN SCREENING (SYSTEM ASSIGNED)  Completed     PNEUMOCOCCAL  Completed     HEPATITIS C SCREENING  Completed     Patient Active Problem List   Diagnosis     GERD (gastroesophageal reflux disease)     CARDIOVASCULAR SCREENING; LDL GOAL LESS THAN 130     Hypertension goal BP (blood pressure) < 140/90     Long-term (current) use of anticoagulants, INR goal 2.0-3.0     Advanced directives, counseling/discussion     CKD (chronic kidney disease) stage 3, GFR 30-59 ml/min     Health Care Home     Seborrhea     Eyelid dermatitis, eczematous     Uterine prolapse     Paroxysmal atrial fibrillation (H)     Pure hypercholesterolemia     Past Surgical History:   Procedure Laterality Date     D & C  80'S     HYSTERECTOMY  11-5-15      ROTATOR CUFF REPAIR RT/LT  2007    LEFT     TUBAL LIGATION  80'S       Social History   Substance Use Topics     Smoking status: Former Smoker     Smokeless tobacco: Never Used     Alcohol use Yes      Comment: 1 - 2 PER WEEK     Family History   Problem Relation Age of Onset     Hypertension Mother      CEREBROVASCULAR DISEASE Mother      Thyroid Disease Mother      CEREBROVASCULAR DISEASE Maternal Grandfather      Hypertension Brother      Musculoskeletal Disorder Brother      FIBROMYALGIA     HEART DISEASE Brother      Cardiovascular Brother      ATRIAL FIB     Musculoskeletal Disorder Sister      FIBROMYALGIA     Thyroid Disease Sister      Allergies Son      HEART DISEASE Brother      Cardiovascular Brother      HEART DISEASE Brother      Cardiovascular Brother      HEART DISEASE Brother      Cardiovascular Brother      HEART DISEASE Father      Cardiovascular Brother      ATRIAL FIB     Cardiovascular Brother      ATRIAL FIB     Cardiovascular Brother      ATRIAL FIB     Cardiovascular Brother      ATRIAL FIB         Current Outpatient Prescriptions   Medication Sig Dispense Refill     pravastatin (PRAVACHOL) 20 MG tablet Take 1 tablet (20 mg) by mouth daily 90 tablet 3     propafenone (RYTHMOL SR) 425 MG 12 hr capsule Take 1 capsule (425 mg) by mouth 2 times daily 180 capsule 3     lisinopril (PRINIVIL/ZESTRIL) 5 MG tablet Take 1 tablet (5 mg) by mouth daily 90 tablet 3     atenolol (TENORMIN) 25 MG tablet Take 0.5 tablets (12.5 mg) by mouth daily 45 tablet 3     warfarin (COUMADIN) 1 MG tablet Take 1 tablet (1 mg) by mouth on Tuesdays, Thursdays, and Sundays, and take 2 tablets (2 mg) by mouth on the other 4 days of the week or as  140 tablet 0     desonide (DESOWEN) 0.05 % ointment Apply small amount twice daily to the eyelids for up to two weeks at a time. Take caution to not get it in the eyes. (Patient taking differently: as needed Apply small amount twice daily to the eyelids for up to two weeks at a  "time. Take caution to not get it in the eyes.) 15 g 3     order for DME Equipment being ordered: SP boot walker. 1 each 0     Multiple Vitamins-Minerals (MULTIVITAL PO)        Coenzyme Q10 (CO Q-10) 200 MG CAPS Take 200 mg by mouth daily 90 capsule 3     Cholecalciferol (VITAMIN D) 1000 UNITS capsule Take 1 capsule by mouth daily.       probiotic CAPS Take  by mouth. 4 times weekly       atenolol (TENORMIN) 12.5 MG TABS Take 12.5 mg by mouth daily. Half tablet daily       Calcium-Magnesium-Zinc 333-133-5 MG TABS Take by mouth 2 times daily        Calcium Carbonate Antacid (TUMS PO) Take  by mouth. prn       [DISCONTINUED] Pravastatin Sodium (PRAVACHOL PO) Take 20 mg by mouth daily       [DISCONTINUED] propafenone (RYTHMOL SR) 425 MG 12 hr capsule Take 425 mg by mouth 2 times daily.       [DISCONTINUED] LISINOPRIL 5 MG OR TABS 1 TABLET DAILY       Allergies   Allergen Reactions     Diflucan [Fluconazole] Rash     NOT EXACTLY SURE     Lamisil Rash         Mammogram Screening: Patient over age 50, mutual decision to screen reflected in health maintenance.      ROS:  Constitutional, HEENT, cardiovascular, pulmonary, GI, , musculoskeletal, neuro, skin, endocrine and psych systems are negative, except as otherwise noted.      OBJECTIVE:   /80 (BP Location: Left arm, Patient Position: Chair, Cuff Size: Adult Regular)  Pulse 60  Temp 97.6  F (36.4  C) (Oral)  Ht 1.588 m (5' 2.5\")  Wt 64.4 kg (142 lb)  SpO2 97%  Breastfeeding? No  BMI 25.56 kg/m2 Estimated body mass index is 25.56 kg/(m^2) as calculated from the following:    Height as of this encounter: 1.588 m (5' 2.5\").    Weight as of this encounter: 64.4 kg (142 lb).  EXAM:   GENERAL: healthy, alert and no distress  EYES: Eyes grossly normal to inspection, PERRL and conjunctivae and sclerae normal  HENT: ear canals and TM's normal, nose and mouth without ulcers or lesions  NECK: no adenopathy, no asymmetry, masses, or scars and thyroid normal to " palpation  RESP: lungs clear to auscultation - no rales, rhonchi or wheezes  BREAST: normal without masses, tenderness or nipple discharge and no palpable axillary masses or adenopathy  CV: regular rate and rhythm, normal S1 S2, no S3 or S4, no murmur, click or rub, no peripheral edema and peripheral pulses strong  ABDOMEN: soft, nontender, no hepatosplenomegaly, no masses and bowel sounds normal  MS: no gross musculoskeletal defects noted, no edema  SKIN: no suspicious lesions or rashes  NEURO: Normal strength and tone, mentation intact and speech normal  PSYCH: mentation appears normal, affect normal/bright    ASSESSMENT / PLAN:       ICD-10-CM    1. Routine history and physical examination of adult Z00.00    2. Hypertension goal BP (blood pressure) < 140/90 I10 propafenone (RYTHMOL SR) 425 MG 12 hr capsule     lisinopril (PRINIVIL/ZESTRIL) 5 MG tablet     atenolol (TENORMIN) 25 MG tablet   3. Pure hypercholesterolemia E78.00    4. Paroxysmal atrial fibrillation (H) I48.0    5. Screen for colon cancer Z12.11 Fecal colorectal cancer screen FIT - Future (S+30)   1. Normal exam  2. Stable   Continue current BP medications  3. Slightly elevated but patient came back from a cruise before taking her cholesterol test.  Patient will get back to regular diet and exercise routine  Continue current medications without changes   4. Stable. Currently in NSR   Continue warfarin   5. Patient declined colonoscopy, she will continue to do FIT tests       End of Life Planning:  Patient currently has an advanced directive: Yes.  Practitioner is supportive of decision.    COUNSELING:  Reviewed preventive health counseling, as reflected in patient instructions       Regular exercise       Healthy diet/nutrition       Immunizations    Declined: Influenza due to Other has gotten it at [facility] pharmacy             Colon cancer screening    Estimated body mass index is 25.56 kg/(m^2) as calculated from the following:    Height as of this  "encounter: 1.588 m (5' 2.5\").    Weight as of this encounter: 64.4 kg (142 lb).     reports that she has quit smoking. She has never used smokeless tobacco.      Appropriate preventive services were discussed with this patient, including applicable screening as appropriate for cardiovascular disease, diabetes, osteopenia/osteoporosis, and glaucoma.  As appropriate for age/gender, discussed screening for colorectal cancer, prostate cancer, breast cancer, and cervical cancer. Checklist reviewing preventive services available has been given to the patient.    Reviewed patients plan of care and provided an AVS. The Basic Care Plan (routine screening as documented in Health Maintenance) for Jenn meets the Care Plan requirement. This Care Plan has been established and reviewed with the Patient.    Counseling Resources:  ATP IV Guidelines  Pooled Cohorts Equation Calculator  Breast Cancer Risk Calculator  FRAX Risk Assessment  ICSI Preventive Guidelines  Dietary Guidelines for Americans, 2010  USDA's MyPlate  ASA Prophylaxis  Lung CA Screening    Taylor Mehta PA-C  [facility]  "

## 2017-10-17 NOTE — NURSING NOTE
"Chief Complaint   Patient presents with     Physical     Non fasting       Initial /80 (BP Location: Left arm, Patient Position: Chair, Cuff Size: Adult Regular)  Pulse 60  Temp 97.6  F (36.4  C) (Oral)  Ht 5' 2.5\" (1.588 m)  Wt 142 lb (64.4 kg)  SpO2 97%  Breastfeeding? No  BMI 25.56 kg/m2 Estimated body mass index is 25.56 kg/(m^2) as calculated from the following:    Height as of this encounter: 5' 2.5\" (1.588 m).    Weight as of this encounter: 142 lb (64.4 kg).  Medication Reconciliation: complete     Taj Boone CMA    "

## 2017-10-18 ENCOUNTER — TELEPHONE (OUTPATIENT)
Dept: FAMILY MEDICINE | Facility: CLINIC | Age: 72
End: 2017-10-18

## 2017-10-18 ENCOUNTER — ANTICOAGULATION THERAPY VISIT (OUTPATIENT)
Dept: NURSING | Facility: CLINIC | Age: 72
End: 2017-10-18
Payer: COMMERCIAL

## 2017-10-18 DIAGNOSIS — Z79.01 LONG-TERM (CURRENT) USE OF ANTICOAGULANTS, INR GOAL 2.0-3.0: Primary | ICD-10-CM

## 2017-10-18 DIAGNOSIS — I48.0 PAROXYSMAL ATRIAL FIBRILLATION (H): ICD-10-CM

## 2017-10-18 LAB — INR POINT OF CARE: 2.9 (ref 0.86–1.14)

## 2017-10-18 PROCEDURE — 82274 ASSAY TEST FOR BLOOD FECAL: CPT | Performed by: PHYSICIAN ASSISTANT

## 2017-10-18 PROCEDURE — 99207 ZZC NO CHARGE NURSE ONLY: CPT

## 2017-10-18 PROCEDURE — 85610 PROTHROMBIN TIME: CPT | Mod: QW

## 2017-10-18 PROCEDURE — 36416 COLLJ CAPILLARY BLOOD SPEC: CPT

## 2017-10-18 NOTE — MR AVS SNAPSHOT
Jenn TRAMMELL Jake   10/18/2017 2:00 PM   Anticoagulation Therapy Visit    Description:  72 year old female   Provider:  MIGUEL ANGEL DE LA CRUZ   Department:  [facility]           INR as of 10/18/2017     Today's INR 2.9      Anticoagulation Summary as of 10/18/2017     INR goal 2.0-3.0   Today's INR 2.9   Full instructions 1 mg on Sun, Tue, Thu; 2 mg all other days   Next INR check 11/29/2017    Indications   Long-term (current) use of anticoagulants [Z79.01] (Resolved) [Z79.01]  Atrial fibrillation/flutter (Resolved)         Your next Anticoagulation Clinic appointment(s)     Nov 29, 2017  2:00 PM CST   Anticoagulation Visit with MIGUEL ANGEL DE LA CRUZ   [facility] ([facility])    [address] [place] [id number] [phone]              Contact Numbers     [facility]  Please call  [phone] to cancel and/or reschedule your appointment, or with any problems or questions regarding your therapy.        October 2017 Details    Sun Mon Tue Wed Thu Fri Sat     1               2               3               4               5               6               7                 8               9               10               11               12               13               14                 15               16               17               18      2 mg   See details      19      1 mg         20      2 mg         21      2 mg           22      1 mg         23      2 mg         24      1 mg         25      2 mg         26      1 mg         27      2 mg         28      2 mg           29      1 mg         30      2 mg         31      1 mg              Date Details   10/18 This INR check               How to take your warfarin dose     To take:  1 mg Take 1 of the 1 mg tablets.    To take:  2 mg Take 2 of the 1 mg tablets.           November 2017 Details    Sun Mon Tue Wed Thu Fri Sat        1      2 mg         2      1 mg         3      2 mg         4      2 mg            5      1 mg         6      2 mg         7      1 mg         8      2 mg         9      1 mg         10      2 mg         11      2 mg           12      1 mg         13      2 mg         14      1 mg         15      2 mg         16      1 mg         17      2 mg         18      2 mg           19      1 mg         20      2 mg         21      1 mg         22      2 mg         23      1 mg         24      2 mg         25      2 mg           26      1 mg         27      2 mg         28      1 mg         29            30                  Date Details   No additional details    Date of next INR:  11/29/2017         How to take your warfarin dose     To take:  1 mg Take 1 of the 1 mg tablets.    To take:  2 mg Take 2 of the 1 mg tablets.

## 2017-10-18 NOTE — PROGRESS NOTES
ANTICOAGULATION FOLLOW-UP CLINIC VISIT    Patient Name:  Jenn Joseph  Date:  10/18/2017  Contact Type:  Face to Face    SUBJECTIVE:     Patient Findings     Positives No Problem Findings           OBJECTIVE    INR Protime   Date Value Ref Range Status   10/18/2017 2.9 (A) 0.86 - 1.14 Final       ASSESSMENT / PLAN  INR assessment THER    Recheck INR In: 6 WEEKS    INR Location Clinic      Anticoagulation Summary as of 10/18/2017     INR goal 2.0-3.0   Today's INR 2.9   Maintenance plan 1 mg (1 mg x 1) on Sun, Tue, Thu; 2 mg (1 mg x 2) all other days   Full instructions 1 mg on Sun, Tue, Thu; 2 mg all other days   Weekly total 11 mg   No change documented Magdaleno Seals RN   Plan last modified Cheryl Dyer RN (4/13/2016)   Next INR check 11/29/2017   Target end date     Indications   Long-term (current) use of anticoagulants [Z79.01] (Resolved) [Z79.01]  Atrial fibrillation/flutter (Resolved)         Anticoagulation Episode Summary     INR check location     Preferred lab     Send INR reminders to  [facility]    Comments       Anticoagulation Care Providers     Provider Role Specialty Phone number    J LuisNayeli ott PA-C Responsible Physician Assistant [phone]            See the Encounter Report to view Anticoagulation Flowsheet and Dosing Calendar (Go to Encounters tab in chart review, and find the Anticoagulation Therapy Visit)        Magdaleno Seals, RN

## 2017-10-18 NOTE — TELEPHONE ENCOUNTER
Patient requires a new INR referral with change in PCP.    Routing message to provider (Taylor Mehta) to review and advise (INR referral pended for approval)    Magdaleno Seals RN

## 2017-10-20 DIAGNOSIS — Z12.11 SCREEN FOR COLON CANCER: ICD-10-CM

## 2017-10-20 LAB — HEMOCCULT STL QL IA: NEGATIVE

## 2017-11-29 ENCOUNTER — ANTICOAGULATION THERAPY VISIT (OUTPATIENT)
Dept: NURSING | Facility: CLINIC | Age: 72
End: 2017-11-29
Payer: COMMERCIAL

## 2017-11-29 LAB — INR POINT OF CARE: 2.9 (ref 0.86–1.14)

## 2017-11-29 PROCEDURE — 99207 ZZC NO CHARGE NURSE ONLY: CPT

## 2017-11-29 PROCEDURE — 36416 COLLJ CAPILLARY BLOOD SPEC: CPT

## 2017-11-29 PROCEDURE — 85610 PROTHROMBIN TIME: CPT | Mod: QW

## 2017-11-29 NOTE — MR AVS SNAPSHOT
Jenn TRAMMELL Jake   11/29/2017 11:00 AM   Anticoagulation Therapy Visit    Description:  72 year old female   Provider:  MIGUEL ANGEL DE LA CRUZ   Department:  [facility]           INR as of 11/29/2017     Today's INR 2.9      Anticoagulation Summary as of 11/29/2017     INR goal 2.0-3.0   Today's INR 2.9   Full instructions 1 mg on Sun, Tue, Thu; 2 mg all other days   Next INR check 1/10/2018    Indications   Long-term (current) use of anticoagulants [Z79.01] (Resolved) [Z79.01]  Atrial fibrillation/flutter (Resolved)         Your next Anticoagulation Clinic appointment(s)     Guero 10, 2018 11:00 AM CST   Anticoagulation Visit with MIGUEL ANGEL DE LA CRUZ   [facility]    [address] [place] [address] [phone]              Contact Numbers     [facility]  Please call  [phone] to cancel and/or reschedule your appointment, or with any problems or questions regarding your therapy.        November 2017 Details    Sun Mon Tue Wed Thu Fri Sat        1               2               3               4                 5               6               7               8               9               10               11                 12               13               14               15               16               17               18                 19               20               21               22               23               24               25                 26               27               28               29      2 mg   See details      30      1 mg            Date Details   11/29 This INR check               How to take your warfarin dose     To take:  1 mg Take 1 of the 1 mg tablets.    To take:  2 mg Take 2 of the 1 mg tablets.           December 2017 Details    Sun Mon Tue Wed Thu Fri Sat          1      2 mg         2      2 mg           3      1 mg         4      2 mg         5      1 mg         6      2 mg         7      1 mg          8      2 mg         9      2 mg           10      1 mg         11      2 mg         12      1 mg         13      2 mg         14      1 mg         15      2 mg         16      2 mg           17      1 mg         18      2 mg         19      1 mg         20      2 mg         21      1 mg         22      2 mg         23      2 mg           24      1 mg         25      2 mg         26      1 mg         27      2 mg         28      1 mg         29      2 mg         30      2 mg           31      1 mg                Date Details   No additional details            How to take your warfarin dose     To take:  1 mg Take 1 of the 1 mg tablets.    To take:  2 mg Take 2 of the 1 mg tablets.           January 2018 Details    Sun Mon Tue Wed Thu Fri Sat      1      2 mg         2      1 mg         3      2 mg         4      1 mg         5      2 mg         6      2 mg           7      1 mg         8      2 mg         9      1 mg         10            11               12               13                 14               15               16               17               18               19               20                 21               22               23               24               25               26               27                 28               29               30               31                   Date Details   No additional details    Date of next INR:  1/10/2018         How to take your warfarin dose     To take:  1 mg Take 1 of the 1 mg tablets.    To take:  2 mg Take 2 of the 1 mg tablets.

## 2017-12-11 DIAGNOSIS — I48.20 CHRONIC ATRIAL FIBRILLATION (H): ICD-10-CM

## 2017-12-11 DIAGNOSIS — Z79.01 LONG-TERM (CURRENT) USE OF ANTICOAGULANTS, INR GOAL 2.0-3.0: ICD-10-CM

## 2017-12-12 RX ORDER — WARFARIN SODIUM 1 MG/1
TABLET ORAL
Qty: 140 TABLET | Refills: 0 | Status: SHIPPED | OUTPATIENT
Start: 2017-12-12 | End: 2018-02-21

## 2017-12-12 NOTE — TELEPHONE ENCOUNTER
Requested Prescriptions   Pending Prescriptions Disp Refills     warfarin (COUMADIN) 1 MG tablet [Pharmacy Med Name: Warfarin Sodium Oral Tablet 1 MG]  Last Written Prescription Date:  09/06/17  Last Fill Quantity: 140,  # refills: 0   Last Office Visit with FMHARDY, CHRIS or [facility] prescribing provider:  10/17/17   Future Office Visit:    140 tablet 0     Sig: take 1 tablet by mouth on tuesdays, thursdays, and sundays and take 2 tablets the other 4 days of the week    Vitamin K Antagonists Failed    12/11/2017  9:18 PM       Failed - INR is within goal in the past 6 weeks    Confirm INR is within goal in the past 6 weeks.     Recent Labs   Lab Test 11/29/17   INR  2.9*                      Passed - Recent or future visit with authorizing provider's specialty    Patient had office visit in the last year or has a visit in the next 30 days with authorizing provider.  See chart review.              Passed - Patient is 18 years of age or older       Passed - Patient is not pregnant       Passed - No positive pregnancy on file in past 12 months

## 2017-12-12 NOTE — TELEPHONE ENCOUNTER
Warfarin  Prescription approved per [facility] [place] [place] Refill Protocol.    Devyn Nunes RN, BSN

## 2018-01-11 ENCOUNTER — ANTICOAGULATION THERAPY VISIT (OUTPATIENT)
Dept: NURSING | Facility: CLINIC | Age: 73
End: 2018-01-11
Payer: COMMERCIAL

## 2018-01-11 LAB — INR POINT OF CARE: 2.5 (ref 0.86–1.14)

## 2018-01-11 PROCEDURE — 85610 PROTHROMBIN TIME: CPT | Mod: QW

## 2018-01-11 PROCEDURE — 36416 COLLJ CAPILLARY BLOOD SPEC: CPT

## 2018-01-11 PROCEDURE — 99207 ZZC NO CHARGE NURSE ONLY: CPT

## 2018-01-11 NOTE — PROGRESS NOTES
ANTICOAGULATION FOLLOW-UP CLINIC VISIT    Patient Name:  Jenn Joseph  Date:  1/11/2018  Contact Type:  Face to Face    SUBJECTIVE:     Patient Findings     Positives No Problem Findings           OBJECTIVE    INR Protime   Date Value Ref Range Status   01/11/2018 2.5 (A) 0.86 - 1.14 Final       ASSESSMENT / PLAN  INR assessment THER    Recheck INR In: 6 WEEKS    INR Location Clinic      Anticoagulation Summary as of 1/11/2018     INR goal 2.0-3.0   Today's INR 2.5   Maintenance plan 1 mg (1 mg x 1) on Sun, Tue, Thu; 2 mg (1 mg x 2) all other days   Full instructions 1 mg on Sun, Tue, Thu; 2 mg all other days   Weekly total 11 mg   No change documented Kim Zhang RN   Plan last modified Cheryl Dyer RN (4/13/2016)   Next INR check 2/21/2018   Target end date     Indications   Long-term (current) use of anticoagulants [Z79.01] (Resolved) [Z79.01]  Atrial fibrillation/flutter (Resolved)         Anticoagulation Episode Summary     INR check location     Preferred lab     Send INR reminders to  [facility]    Comments       Anticoagulation Care Providers     Provider Role Specialty Phone number    J LuisNayeli ott PA-C Responsible Physician Assistant [phone]            See the Encounter Report to view Anticoagulation Flowsheet and Dosing Calendar (Go to Encounters tab in chart review, and find the Anticoagulation Therapy Visit)      Kim Zhang, RD

## 2018-01-11 NOTE — MR AVS SNAPSHOT
Jenn TRAMMELL Jake   1/11/2018 12:40 PM   Anticoagulation Therapy Visit    Description:  72 year old female   Provider:  MIGUEL ANGEL DE LA CRUZ   Department:  [facility]           INR as of 1/11/2018     Today's INR 2.5      Anticoagulation Summary as of 1/11/2018     INR goal 2.0-3.0   Today's INR 2.5   Full instructions 1 mg on Sun, Tue, Thu; 2 mg all other days   Next INR check 2/21/2018    Indications   Long-term (current) use of anticoagulants [Z79.01] (Resolved) [Z79.01]  Atrial fibrillation/flutter (Resolved)         Your next Anticoagulation Clinic appointment(s)     Feb 21, 2018  2:00 PM CST   Anticoagulation Visit with MIGUEL ANGEL DE LA CRUZ   [facility] ([facility])    [address] [place] [address] [phone]              Contact Numbers     [facility]  Please call  [phone] to cancel and/or reschedule your appointment, or with any problems or questions regarding your therapy.        January 2018 Details    Sun Mon Tue Wed Thu Fri Sat      1               2               3               4               5               6                 7               8               9               10               11      1 mg   See details      12      2 mg         13      2 mg           14      1 mg         15      2 mg         16      1 mg         17      2 mg         18      1 mg         19      2 mg         20      2 mg           21      1 mg         22      2 mg         23      1 mg         24      2 mg         25      1 mg         26      2 mg         27      2 mg           28      1 mg         29      2 mg         30      1 mg         31      2 mg             Date Details   01/11 This INR check               How to take your warfarin dose     To take:  1 mg Take 1 of the 1 mg tablets.    To take:  2 mg Take 2 of the 1 mg tablets.           February 2018 Details    Sun Mon Tue Wed Thu Fri Sat         1      1 mg         2      2 mg         3      2 mg            4      1 mg         5      2 mg         6      1 mg         7      2 mg         8      1 mg         9      2 mg         10      2 mg           11      1 mg         12      2 mg         13      1 mg         14      2 mg         15      1 mg         16      2 mg         17      2 mg           18      1 mg         19      2 mg         20      1 mg         21            22               23               24                 25               26               27               28                   Date Details   No additional details    Date of next INR:  2/21/2018         How to take your warfarin dose     To take:  1 mg Take 1 of the 1 mg tablets.    To take:  2 mg Take 2 of the 1 mg tablets.

## 2018-02-21 ENCOUNTER — ANTICOAGULATION THERAPY VISIT (OUTPATIENT)
Dept: NURSING | Facility: CLINIC | Age: 73
End: 2018-02-21
Payer: COMMERCIAL

## 2018-02-21 DIAGNOSIS — Z79.01 LONG-TERM (CURRENT) USE OF ANTICOAGULANTS, INR GOAL 2.0-3.0: ICD-10-CM

## 2018-02-21 DIAGNOSIS — I48.20 CHRONIC ATRIAL FIBRILLATION (H): ICD-10-CM

## 2018-02-21 LAB — INR POINT OF CARE: 2.6 (ref 0.86–1.14)

## 2018-02-21 PROCEDURE — 85610 PROTHROMBIN TIME: CPT | Mod: QW

## 2018-02-21 PROCEDURE — 36416 COLLJ CAPILLARY BLOOD SPEC: CPT

## 2018-02-21 PROCEDURE — 99207 ZZC NO CHARGE NURSE ONLY: CPT

## 2018-02-21 RX ORDER — WARFARIN SODIUM 1 MG/1
TABLET ORAL
Qty: 140 TABLET | Refills: 0 | Status: SHIPPED | OUTPATIENT
Start: 2018-02-21 | End: 2018-06-05

## 2018-02-21 NOTE — PROGRESS NOTES
ANTICOAGULATION FOLLOW-UP CLINIC VISIT    Patient Name:  Jenn Joseph  Date:  2/21/2018  Contact Type:  Face to Face    SUBJECTIVE:     Patient Findings     Positives No Problem Findings           OBJECTIVE    INR Protime   Date Value Ref Range Status   02/21/2018 2.6 (A) 0.86 - 1.14 Final       ASSESSMENT / PLAN  INR assessment THER    Recheck INR In: 6 WEEKS    INR Location Clinic      Anticoagulation Summary as of 2/21/2018     INR goal 2.0-3.0   Today's INR 2.6   Maintenance plan 1 mg (1 mg x 1) on Sun, Tue, Thu; 2 mg (1 mg x 2) all other days   Full instructions 1 mg on Sun, Tue, Thu; 2 mg all other days   Weekly total 11 mg   No change documented Kim Zhang RN   Plan last modified Cheryl Dyer RN (4/13/2016)   Next INR check 4/4/2018   Target end date     Indications   Long-term (current) use of anticoagulants [Z79.01] (Resolved) [Z79.01]  Atrial fibrillation/flutter (Resolved)         Anticoagulation Episode Summary     INR check location     Preferred lab     Send INR reminders to [facility]    Comments       Anticoagulation Care Providers     Provider Role Specialty Phone number    J LuisNayeli ott PA-C Responsible Physician Assistant [phone]            See the Encounter Report to view Anticoagulation Flowsheet and Dosing Calendar (Go to Encounters tab in chart review, and find the Anticoagulation Therapy Visit)    Kim Zhang, RN

## 2018-02-21 NOTE — MR AVS SNAPSHOT
Jenn JP Joseph   2/21/2018 2:00 PM   Anticoagulation Therapy Visit    Description:  72 year old female   Provider:  MIGUEL ANGEL DE LA CRUZ   Department:  [facility]           INR as of 2/21/2018     Today's INR 2.6      Anticoagulation Summary as of 2/21/2018     INR goal 2.0-3.0   Today's INR 2.6   Full instructions 1 mg on Sun, Tue, Thu; 2 mg all other days   Next INR check 4/4/2018    Indications   Long-term (current) use of anticoagulants [Z79.01] (Resolved) [Z79.01]  Atrial fibrillation/flutter (Resolved)         Your next Anticoagulation Clinic appointment(s)     Apr 04, 2018  2:00 PM CDT   Anticoagulation Visit with MIGUEL ANGEL DE LA CRUZ   [facility] ([facility])    [address] [place] [address] [phone]              Contact Numbers     [facility]  Please call  [phone] to cancel and/or reschedule your appointment, or with any problems or questions regarding your therapy.        February 2018 Details    Sun Mon Tue Wed Thu Fri Sat         1               2               3                 4               5               6               7               8               9               10                 11               12               13               14               15               16               17                 18               19               20               21      2 mg   See details      22      1 mg         23      2 mg         24      2 mg           25      1 mg         26      2 mg         27      1 mg         28      2 mg             Date Details   02/21 This INR check               How to take your warfarin dose     To take:  1 mg Take 1 of the 1 mg tablets.    To take:  2 mg Take 2 of the 1 mg tablets.           March 2018 Details    Sun Mon Tue Wed Thu Fri Sat         1      1 mg         2      2 mg         3      2 mg           4      1 mg         5      2 mg         6      1 mg         7      2 mg         8      1 mg          9      2 mg         10      2 mg           11      1 mg         12      2 mg         13      1 mg         14      2 mg         15      1 mg         16      2 mg         17      2 mg           18      1 mg         19      2 mg         20      1 mg         21      2 mg         22      1 mg         23      2 mg         24      2 mg           25      1 mg         26      2 mg         27      1 mg         28      2 mg         29      1 mg         30      2 mg         31      2 mg          Date Details   No additional details            How to take your warfarin dose     To take:  1 mg Take 1 of the 1 mg tablets.    To take:  2 mg Take 2 of the 1 mg tablets.           April 2018 Details    Sun Mon Tue Wed Thu Fri Sat     1      1 mg         2      2 mg         3      1 mg         4            5               6               7                 8               9               10               11               12               13               14                 15               16               17               18               19               20               21                 22               23               24               25               26               27               28                 29               30                     Date Details   No additional details    Date of next INR:  4/4/2018         How to take your warfarin dose     To take:  1 mg Take 1 of the 1 mg tablets.    To take:  2 mg Take 2 of the 1 mg tablets.

## 2018-03-23 ENCOUNTER — TRANSFERRED RECORDS (OUTPATIENT)
Dept: HEALTH INFORMATION MANAGEMENT | Facility: CLINIC | Age: 73
End: 2018-03-23

## 2018-04-09 ENCOUNTER — ANTICOAGULATION THERAPY VISIT (OUTPATIENT)
Dept: NURSING | Facility: CLINIC | Age: 73
End: 2018-04-09
Payer: COMMERCIAL

## 2018-04-09 LAB — INR POINT OF CARE: 3.2 (ref 0.86–1.14)

## 2018-04-09 PROCEDURE — 99207 ZZC NO CHARGE NURSE ONLY: CPT

## 2018-04-09 PROCEDURE — 36416 COLLJ CAPILLARY BLOOD SPEC: CPT

## 2018-04-09 PROCEDURE — 85610 PROTHROMBIN TIME: CPT | Mod: QW

## 2018-04-09 NOTE — MR AVS SNAPSHOT
Jenn TRAMMELL Jake   4/9/2018 2:00 PM   Anticoagulation Therapy Visit    Description:  73 year old female   Provider:  MIGUEL ANGEL DE LA CRUZ   Department:  [facility]           INR as of 4/9/2018     Today's INR 3.2!      Anticoagulation Summary as of 4/9/2018     INR goal 2.0-3.0   Today's INR 3.2!   Full instructions 1 mg on Sun, Tue, Thu; 2 mg all other days   Next INR check 4/25/2018    Indications   Long-term (current) use of anticoagulants [Z79.01] (Resolved) [Z79.01]  Atrial fibrillation/flutter (Resolved)         Your next Anticoagulation Clinic appointment(s)     Apr 25, 2018  2:00 PM CDT   Anticoagulation Visit with MIGUEL ANGEL DE LA CRUZ   [facility] ([facility])    [address] [place] [id number] [phone]              Contact Numbers     [facility]  Please call  [phone] to cancel and/or reschedule your appointment, or with any problems or questions regarding your therapy.        April 2018 Details    Sun Mon Tue Wed Thu Fri Sat     1               2               3               4               5               6               7                 8               9      2 mg   See details      10      1 mg         11      2 mg         12      1 mg         13      2 mg         14      2 mg           15      1 mg         16      2 mg         17      1 mg         18      2 mg         19      1 mg         20      2 mg         21      2 mg           22      1 mg         23      2 mg         24      1 mg         25            26               27               28                 29               30                     Date Details   04/09 This INR check       Date of next INR:  4/25/2018         How to take your warfarin dose     To take:  1 mg Take 1 of the 1 mg tablets.    To take:  2 mg Take 2 of the 1 mg tablets.

## 2018-04-09 NOTE — PROGRESS NOTES
ANTICOAGULATION FOLLOW-UP CLINIC VISIT    Patient Name:  Jenn Joseph  Date:  4/9/2018  Contact Type:  Face to Face    SUBJECTIVE:     Patient Findings     Positives Change in diet/appetite (decrease in greens)           OBJECTIVE    INR Protime   Date Value Ref Range Status   04/09/2018 3.2 (A) 0.86 - 1.14 Final       ASSESSMENT / PLAN  INR assessment SUPRA    Recheck INR In: 2 WEEKS    INR Location Clinic      Anticoagulation Summary as of 4/9/2018     INR goal 2.0-3.0   Today's INR 3.2!   Maintenance plan 1 mg (1 mg x 1) on Sun, Tue, Thu; 2 mg (1 mg x 2) all other days   Full instructions 1 mg on Sun, Tue, Thu; 2 mg all other days   Weekly total 11 mg   No change documented Kim Zhang RN   Plan last modified Cheryl Dyer RN (4/13/2016)   Next INR check 4/25/2018   Target end date     Indications   Long-term (current) use of anticoagulants [Z79.01] (Resolved) [Z79.01]  Atrial fibrillation/flutter (Resolved)         Anticoagulation Episode Summary     INR check location     Preferred lab     Send INR reminders to  [facility]    Comments       Anticoagulation Care Providers     Provider Role Specialty Phone number    J LuisNayeli ott PA-C Responsible Physician Assistant [phone]            See the Encounter Report to view Anticoagulation Flowsheet and Dosing Calendar (Go to Encounters tab in chart review, and find the Anticoagulation Therapy Visit)    Dosage adjustment made based on physician directed care plan. Patient will increase greens.    Kim Zhang, RN

## 2018-04-25 ENCOUNTER — ANTICOAGULATION THERAPY VISIT (OUTPATIENT)
Dept: NURSING | Facility: CLINIC | Age: 73
End: 2018-04-25
Payer: COMMERCIAL

## 2018-04-25 LAB — INR POINT OF CARE: 3 (ref 0.86–1.14)

## 2018-04-25 PROCEDURE — 36416 COLLJ CAPILLARY BLOOD SPEC: CPT

## 2018-04-25 PROCEDURE — 85610 PROTHROMBIN TIME: CPT | Mod: QW

## 2018-04-25 PROCEDURE — 99207 ZZC NO CHARGE NURSE ONLY: CPT

## 2018-04-25 NOTE — MR AVS SNAPSHOT
Jenn JP Joseph   4/25/2018 2:00 PM   Anticoagulation Therapy Visit    Description:  73 year old female   Provider:  MIGUEL ANGEL DE LA CRUZ   Department:  [facility]           INR as of 4/25/2018     Today's INR 3.0      Anticoagulation Summary as of 4/25/2018     INR goal 2.0-3.0   Today's INR 3.0   Full instructions 2 mg on Mon, Wed, Fri; 1 mg all other days   Next INR check 5/9/2018    Indications   Long-term (current) use of anticoagulants [Z79.01] (Resolved) [Z79.01]  Atrial fibrillation/flutter (Resolved)         Your next Anticoagulation Clinic appointment(s)     May 09, 2018  2:00 PM CDT   Anticoagulation Visit with MIGUEL ANGEL DE LA CRU[facility]    [address] [place] [id number] [phone]              Contact Numbers     [facility]  Please call  [phone] to cancel and/or reschedule your appointment, or with any problems or questions regarding your therapy.        April 2018 Details    Sun Mon Tue Wed Thu Fri Sat     1               2               3               4               5               6               7                 8               9               10               11               12               13               14                 15               16               17               18               19               20               21                 22               23               24               25      2 mg   See details      26      1 mg         27      2 mg         28      1 mg           29      1 mg         30      2 mg               Date Details   04/25 This INR check               How to take your warfarin dose     To take:  1 mg Take 1 of the 1 mg tablets.    To take:  2 mg Take 2 of the 1 mg tablets.           May 2018 Details    Sun Mon Tue Wed Thu Fri Sat       1      1 mg         2      2 mg         3      1 mg         4      2 mg         5      1 mg           6      1 mg         7      2 mg          8      1 mg         9            10               11               12                 13               14               15               16               17               18               19                 20               21               22               23               24               25               26                 27               28               29               30               31                  Date Details   No additional details    Date of next INR:  5/9/2018         How to take your warfarin dose     To take:  1 mg Take 1 of the 1 mg tablets.    To take:  2 mg Take 2 of the 1 mg tablets.

## 2018-04-25 NOTE — PROGRESS NOTES
ANTICOAGULATION FOLLOW-UP CLINIC VISIT    Patient Name:  Jenn Joesph  Date:  4/25/2018  Contact Type:  Face to Face    SUBJECTIVE:     Patient Findings     Positives Bruising (bruising on right thumb from pushing cart)           OBJECTIVE    INR Protime   Date Value Ref Range Status   04/25/2018 3.0 (A) 0.86 - 1.14 Final       ASSESSMENT / PLAN  INR assessment THER    Recheck INR In: 2 WEEKS    INR Location Clinic      Anticoagulation Summary as of 4/25/2018     INR goal 2.0-3.0   Today's INR 3.0   Maintenance plan 2 mg (1 mg x 2) on Mon, Wed, Fri; 1 mg (1 mg x 1) all other days   Full instructions 2 mg on Mon, Wed, Fri; 1 mg all other days   Weekly total 10 mg   Plan last modified Manjula Hamm RN (4/25/2018)   Next INR check 5/9/2018   Target end date     Indications   Long-term (current) use of anticoagulants [Z79.01] (Resolved) [Z79.01]  Atrial fibrillation/flutter (Resolved)         Anticoagulation Episode Summary     INR check location     Preferred lab     Send INR reminders to [facility]    Comments       Anticoagulation Care Providers     Provider Role Specialty Phone number    Jesus Nayeli Maxwell PA-C Responsible Physician Assistant [phone]            See the Encounter Report to view Anticoagulation Flowsheet and Dosing Calendar (Go to Encounters tab in chart review, and find the Anticoagulation Therapy Visit)    Patient reports that she has bruising on right thumb on right hand. Reports no signs and symptoms of bleeding or clotting.Advised to contact clinic if has signs symptoms bleeding clotting.    Manjula Hamm, RN

## 2018-05-09 ENCOUNTER — ANTICOAGULATION THERAPY VISIT (OUTPATIENT)
Dept: NURSING | Facility: CLINIC | Age: 73
End: 2018-05-09
Payer: COMMERCIAL

## 2018-05-09 LAB — INR POINT OF CARE: 2 (ref 0.86–1.14)

## 2018-05-09 PROCEDURE — 36416 COLLJ CAPILLARY BLOOD SPEC: CPT

## 2018-05-09 PROCEDURE — 99207 ZZC NO CHARGE NURSE ONLY: CPT

## 2018-05-09 PROCEDURE — 85610 PROTHROMBIN TIME: CPT | Mod: QW

## 2018-05-09 NOTE — MR AVS SNAPSHOT
Jenn TRAMMELL Jake   5/9/2018 2:00 PM   Anticoagulation Therapy Visit    Description:  73 year old female   Provider:  MIGUEL ANGEL DE LA CRUZ   Department:  [facility]           INR as of 5/9/2018     Today's INR       Anticoagulation Summary as of 5/9/2018     INR goal 2.0-3.0   Today's INR    Full instructions 2 mg on Mon, Wed, Fri; 1 mg all other days   Next INR check 6/6/2018    Indications   Long-term (current) use of anticoagulants [Z79.01] (Resolved) [Z79.01]  Atrial fibrillation/flutter (Resolved)         Your next Anticoagulation Clinic appointment(s)     Jun 06, 2018  2:00 PM CDT   Anticoagulation Visit with MIGUEL ANGEL DE LA CRUZ   [facility] ([facility])    [address] [place] [id number] [phone]              Contact Numbers     [facility]  Please call  [phone] to cancel and/or reschedule your appointment, or with any problems or questions regarding your therapy.        May 2018 Details    Sun Mon Tue Wed Thu Fri Sat       1               2               3               4               5                 6               7               8               9      2 mg   See details      10      1 mg         11      2 mg         12      1 mg           13      1 mg         14      2 mg         15      1 mg         16      2 mg         17      1 mg         18      2 mg         19      1 mg           20      1 mg         21      2 mg         22      1 mg         23      2 mg         24      1 mg         25      2 mg         26      1 mg           27      1 mg         28      2 mg         29      1 mg         30      2 mg         31      1 mg            Date Details   05/09 This INR check               How to take your warfarin dose     To take:  1 mg Take 1 of the 1 mg tablets.    To take:  2 mg Take 2 of the 1 mg tablets.           June 2018 Details    Sun Mon Tue Wed Thu Fri Sat          1      2 mg         2      1 mg           3      1 mg         4       2 mg         5      1 mg         6            7               8               9                 10               11               12               13               14               15               16                 17               18               19               20               21               22               23                 24               25               26               27               28               29               30                Date Details   No additional details    Date of next INR:  6/6/2018         How to take your warfarin dose     To take:  1 mg Take 1 of the 1 mg tablets.    To take:  2 mg Take 2 of the 1 mg tablets.

## 2018-05-09 NOTE — PROGRESS NOTES
ANTICOAGULATION FOLLOW-UP CLINIC VISIT    Patient Name:  Jenn Joseph  Date:  5/9/2018  Contact Type:  Face to Face    SUBJECTIVE:     Patient Findings     Positives No Problem Findings           OBJECTIVE    INR Protime   Date Value Ref Range Status   05/09/2018 2.0 (A) 0.86 - 1.14 Final       ASSESSMENT / PLAN  INR assessment THER    Recheck INR In: 4 WEEKS    INR Location Clinic      Anticoagulation Summary as of 5/9/2018     INR goal 2.0-3.0   Today's INR 2.0   Maintenance plan 2 mg (1 mg x 2) on Mon, Wed, Fri; 1 mg (1 mg x 1) all other days   Full instructions 2 mg on Mon, Wed, Fri; 1 mg all other days   Weekly total 10 mg   No change documented Argelia Xie RN   Plan last modified Manjula Hamm RN (4/25/2018)   Next INR check 6/6/2018   Target end date     Indications   Long-term (current) use of anticoagulants [Z79.01] (Resolved) [Z79.01]  Atrial fibrillation/flutter (Resolved)         Anticoagulation Episode Summary     INR check location     Preferred lab     Send INR reminders to [facility]    Comments       Anticoagulation Care Providers     Provider Role Specialty Phone number    Jesus Nayeli Maxwell PA-C Responsible Physician Assistant [phone]            See the Encounter Report to view Anticoagulation Flowsheet and Dosing Calendar (Go to Encounters tab in chart review, and find the Anticoagulation Therapy Visit)    Denies any changes to medications or other diet issues. Denies any missed doses or extra doses. Denies bleeding, bruising, or blood clots. Verbalizes understanding of dosing and recheck date. Patient is aware if signs of clotting (pain, tenderness, swelling, color change in leg or arm, SOB) and bleeding occur (blood in stool, urine, large bruising, bleeding gums, nosebleeds) to have INR check sooner. If sx severe report to ER or concerned for stroke call 911. If general questions or concerns arise, call clinic.       Argelia Xie, RD

## 2018-06-05 ENCOUNTER — ANTICOAGULATION THERAPY VISIT (OUTPATIENT)
Dept: NURSING | Facility: CLINIC | Age: 73
End: 2018-06-05
Payer: COMMERCIAL

## 2018-06-05 DIAGNOSIS — Z79.01 LONG-TERM (CURRENT) USE OF ANTICOAGULANTS, INR GOAL 2.0-3.0: ICD-10-CM

## 2018-06-05 DIAGNOSIS — I48.20 CHRONIC ATRIAL FIBRILLATION (H): ICD-10-CM

## 2018-06-05 LAB — INR POINT OF CARE: 2 (ref 0.86–1.14)

## 2018-06-05 PROCEDURE — 36416 COLLJ CAPILLARY BLOOD SPEC: CPT

## 2018-06-05 PROCEDURE — 85610 PROTHROMBIN TIME: CPT | Mod: QW

## 2018-06-05 PROCEDURE — 99207 ZZC NO CHARGE NURSE ONLY: CPT

## 2018-06-05 NOTE — MR AVS SNAPSHOT
Jenn TRAMMELL Jake   6/5/2018 3:40 PM   Anticoagulation Therapy Visit    Description:  73 year old female   Provider:  MIGUEL ANGEL DE LA CRUZ   Department:  [facility]           INR as of 6/5/2018     Today's INR 2.0      Anticoagulation Summary as of 6/5/2018     INR goal 2.0-3.0   Today's INR 2.0   Full warfarin instructions 2 mg on Mon, Wed, Fri; 1 mg all other days   Next INR check 7/17/2018    Indications   Long-term (current) use of anticoagulants [Z79.01] (Resolved) [Z79.01]  Atrial fibrillation/flutter (Resolved)         Your next Anticoagulation Clinic appointment(s)     Jun 05, 2018  3:40 PM CDT   Anticoagulation Visit with MIGUEL ANGEL DE LA CRUZ   [facility]    [address] [facility] [id number] [phone]            Jul 17, 2018  4:00 PM CDT   Anticoagulation Visit with MIGUEL ANGEL DE LA CRUZ   [facility]    [address] [facility] [id number] [phone]              Contact Numbers     [facility]  Please call  [phone] to cancel and/or reschedule your appointment, or with any problems or questions regarding your therapy.        June 2018 Details    Sun Mon Tue Wed Thu Fri Sat          1               2                 3               4               5      1 mg   See details      6      2 mg         7      1 mg         8      2 mg         9      1 mg           10      1 mg         11      2 mg         12      1 mg         13      2 mg         14      1 mg         15      2 mg         16      1 mg           17      1 mg         18      2 mg         19      1 mg         20      2 mg         21      1 mg         22      2 mg         23      1 mg           24      1 mg         25      2 mg         26      1 mg         27      2 mg         28      1 mg         29      2 mg         30      1 mg          Date Details   06/05 This INR check               How to take your warfarin  dose     To take:  1 mg Take 1 of the 1 mg tablets.    To take:  2 mg Take 2 of the 1 mg tablets.           July 2018 Details    Sun Mon Tue Wed Thu Fri Sat     1      1 mg         2      2 mg         3      1 mg         4      2 mg         5      1 mg         6      2 mg         7      1 mg           8      1 mg         9      2 mg         10      1 mg         11      2 mg         12      1 mg         13      2 mg         14      1 mg           15      1 mg         16      2 mg         17            18               19               20               21                 22               23               24               25               26               27               28                 29               30               31                    Date Details   No additional details    Date of next INR:  7/17/2018         How to take your warfarin dose     To take:  1 mg Take 1 of the 1 mg tablets.    To take:  2 mg Take 2 of the 1 mg tablets.

## 2018-06-05 NOTE — PROGRESS NOTES
ANTICOAGULATION FOLLOW-UP CLINIC VISIT    Patient Name:  Jenn Joseph  Date:  6/5/2018  Contact Type:  Face to Face    SUBJECTIVE:     Patient Findings     Positives No Problem Findings           OBJECTIVE    INR Protime   Date Value Ref Range Status   06/05/2018 2.0 (A) 0.86 - 1.14 Final       ASSESSMENT / PLAN  No question data found.  Anticoagulation Summary as of 6/5/2018     INR goal 2.0-3.0   Today's INR 2.0   Warfarin maintenance plan 2 mg (1 mg x 2) on Mon, Wed, Fri; 1 mg (1 mg x 1) all other days   Full warfarin instructions 2 mg on Mon, Wed, Fri; 1 mg all other days   Weekly warfarin total 10 mg   No change documented Artemio Rasheed, RD   Plan last modified Manjula Hamm RN (4/25/2018)   Next INR check 7/17/2018   Target end date     Indications   Long-term (current) use of anticoagulants [Z79.01] (Resolved) [Z79.01]  Atrial fibrillation/flutter (Resolved)         Anticoagulation Episode Summary     INR check location     Preferred lab     Send INR reminders to [facility]    Comments       Anticoagulation Care Providers     Provider Role Specialty Phone number    Jesus Nayeli Maxwell PA-C Responsible Physician Assistant [phone]            See the Encounter Report to view Anticoagulation Flowsheet and Dosing Calendar (Go to Encounters tab in chart review, and find the Anticoagulation Therapy Visit)    No changes to patient's diet, activity, medications and no missed doses or extra doses. INR stable at 2.0, continued same dosing for patient. She is aware to call with s/s of bleeding, clots, bruising or with any questions or concerns. Denies any changes to medications or other diet issues. Denies any missed doses or extra doses. Denies bleeding, bruising, or blood clots. Verbalizes understanding of dosing and recheck date. Patient is aware if signs of clotting (pain, tenderness, swelling, color change in leg or arm, SOB) and bleeding occur (blood in stool, urine, large bruising,  bleeding gums, nosebleeds) to have INR check sooner. If sx severe report to ER or concerned for stroke call 911. If general questions or concerns arise, call clinic.    Artemio Rasheed RN, BSN

## 2018-06-06 RX ORDER — WARFARIN SODIUM 1 MG/1
TABLET ORAL
Qty: 140 TABLET | Refills: 0 | Status: SHIPPED | OUTPATIENT
Start: 2018-06-06 | End: 2018-09-10

## 2018-06-06 NOTE — TELEPHONE ENCOUNTER
"Requested Prescriptions   Pending Prescriptions Disp Refills     warfarin (COUMADIN) 1 MG tablet [Pharmacy Med Name: Warfarin Sodium Oral Tablet 1 MG]  Last Written Prescription Date:  02/21/18  Last Fill Quantity: 140,  # refills: 0   Last Office Visit with [facility], P or [facility] prescribing provider:  10/17/17   Future Office Visit:    140 tablet 0     Sig: Take 1 tablet (1 mg) by mouth on Sunday, Tuesday, Thursday and 2 tablets (2 mg) all other days of week or as directed by INR clinic.    Vitamin K Antagonists Failed    6/5/2018  9:19 PM       Failed - INR is within goal in the past 6 weeks    Confirm INR is within goal in the past 6 weeks.     Recent Labs   Lab Test 06/05/18   INR  2.0*                      Passed - Recent (12 mo) or future (30 days) visit within the authorizing provider's specialty    Patient had office visit in the last 12 months or has a visit in the next 30 days with authorizing provider or within the authorizing provider's specialty.  See \"Patient Info\" tab in inbasket, or \"Choose Columns\" in Meds & Orders section of the refill encounter.           Passed - Patient is 18 years of age or older       Passed - Patient is not pregnant       Passed - No positive pregnancy on file in past 12 months          "

## 2018-06-06 NOTE — TELEPHONE ENCOUNTER
Prescription approved per [facility] Refill Protocol.    Artemio Rasheed RN, BSN

## 2018-07-17 ENCOUNTER — ANTICOAGULATION THERAPY VISIT (OUTPATIENT)
Dept: NURSING | Facility: CLINIC | Age: 73
End: 2018-07-17
Payer: COMMERCIAL

## 2018-07-17 LAB — INR POINT OF CARE: 2.1 (ref 0.86–1.14)

## 2018-07-17 PROCEDURE — 85610 PROTHROMBIN TIME: CPT | Mod: QW

## 2018-07-17 PROCEDURE — 99207 ZZC NO CHARGE NURSE ONLY: CPT

## 2018-07-17 PROCEDURE — 36416 COLLJ CAPILLARY BLOOD SPEC: CPT

## 2018-07-17 NOTE — PROGRESS NOTES
ANTICOAGULATION FOLLOW-UP CLINIC VISIT    Patient Name:  Jenn Joseph  Date:  7/17/2018  Contact Type:  Face to Face    SUBJECTIVE:     Patient Findings     Positives No Problem Findings           OBJECTIVE    INR Protime   Date Value Ref Range Status   07/17/2018 2.1 (A) 0.86 - 1.14 Final       ASSESSMENT / PLAN  INR assessment THER    Recheck INR In: 6 WEEKS    INR Location Clinic      Anticoagulation Summary as of 7/17/2018     INR goal 2.0-3.0   Today's INR 2.1   Warfarin maintenance plan 2 mg (1 mg x 2) on Mon, Wed, Fri; 1 mg (1 mg x 1) all other days   Full warfarin instructions 2 mg on Mon, Wed, Fri; 1 mg all other days   Weekly warfarin total 10 mg   No change documented Argelia Xie, RN   Plan last modified Manjula Hamm RN (4/25/2018)   Next INR check 8/28/2018   Target end date     Indications   Long-term (current) use of anticoagulants [Z79.01] (Resolved) [Z79.01]  Atrial fibrillation/flutter (Resolved)         Anticoagulation Episode Summary     INR check location     Preferred lab     Send INR reminders to [facility]    Comments       Anticoagulation Care Providers     Provider Role Specialty Phone number    J Luistru Nayeli Maxwell PA-C Responsible Physician Assistant - Medical [phone]            See the Encounter Report to view Anticoagulation Flowsheet and Dosing Calendar (Go to Encounters tab in chart review, and find the Anticoagulation Therapy Visit)    Continue with maintenance dosing of warfarin. Denies any changes to medications or other diet issues. Denies any missed doses or extra doses. Denies bleeding, bruising, or blood clots. Verbalizes understanding of dosing and recheck date. Patient is aware if signs of clotting (pain, tenderness, swelling, color change in leg or arm, SOB) and bleeding occur (blood in stool, urine, large bruising, bleeding gums, nosebleeds) to have INR check sooner. If sx severe report to ER or concerned for stroke call 911. If general questions  or concerns arise, call clinic.     Argelia Xie RN

## 2018-07-17 NOTE — MR AVS SNAPSHOT
Jenn TRAMMELL Jake   7/17/2018 4:00 PM   Anticoagulation Therapy Visit    Description:  73 year old female   Provider:  MIGUEL ANGEL DE LA CRUZ   Department:  [facility]           INR as of 7/17/2018     Today's INR 2.1      Anticoagulation Summary as of 7/17/2018     INR goal 2.0-3.0   Today's INR 2.1   Full warfarin instructions 2 mg on Mon, Wed, Fri; 1 mg all other days   Next INR check 8/28/2018    Indications   Long-term (current) use of anticoagulants [Z79.01] (Resolved) [Z79.01]  Atrial fibrillation/flutter (Resolved)         Your next Anticoagulation Clinic appointment(s)     Aug 28, 2018  4:00 PM CDT   Anticoagulation Visit with MIGUEL ANGEL DE LA CRUZ   [facility]    [address] [place] [id number] [phone]              Contact Numbers     [facility]  Please call  [phone] to cancel and/or reschedule your appointment, or with any problems or questions regarding your therapy.        July 2018 Details    Sun Mon Tue Wed Thu Fri Sat     1               2               3               4               5               6               7                 8               9               10               11               12               13               14                 15               16               17      1 mg   See details      18      2 mg         19      1 mg         20      2 mg         21      1 mg           22      1 mg         23      2 mg         24      1 mg         25      2 mg         26      1 mg         27      2 mg         28      1 mg           29      1 mg         30      2 mg         31      1 mg              Date Details   07/17 This INR check               How to take your warfarin dose     To take:  1 mg Take 1 of the 1 mg tablets.    To take:  2 mg Take 2 of the 1 mg tablets.           August 2018 Details    Sun Mon Tue Wed Thu Fri Sat        1      2 mg         2      1 mg         3      2 mg         4      1 mg            5      1 mg         6      2 mg         7      1 mg         8      2 mg         9      1 mg         10      2 mg         11      1 mg           12      1 mg         13      2 mg         14      1 mg         15      2 mg         16      1 mg         17      2 mg         18      1 mg           19      1 mg         20      2 mg         21      1 mg         22      2 mg         23      1 mg         24      2 mg         25      1 mg           26      1 mg         27      2 mg         28            29               30               31                 Date Details   No additional details    Date of next INR:  8/28/2018         How to take your warfarin dose     To take:  1 mg Take 1 of the 1 mg tablets.    To take:  2 mg Take 2 of the 1 mg tablets.

## 2018-08-28 ENCOUNTER — ANTICOAGULATION THERAPY VISIT (OUTPATIENT)
Dept: NURSING | Facility: CLINIC | Age: 73
End: 2018-08-28
Payer: COMMERCIAL

## 2018-08-28 LAB — INR POINT OF CARE: 2.3 (ref 0.86–1.14)

## 2018-08-28 PROCEDURE — 99207 ZZC NO CHARGE NURSE ONLY: CPT

## 2018-08-28 PROCEDURE — 85610 PROTHROMBIN TIME: CPT | Mod: QW

## 2018-08-28 PROCEDURE — 36416 COLLJ CAPILLARY BLOOD SPEC: CPT

## 2018-08-28 NOTE — MR AVS SNAPSHOT
Jenn TRAMMELL Jake   8/28/2018 4:00 PM   Anticoagulation Therapy Visit    Description:  73 year old female   Provider:  MIGUEL ANGEL DE LA CRUZ   Department:  [facility]           INR as of 8/28/2018     Today's INR 2.3      Anticoagulation Summary as of 8/28/2018     INR goal 2.0-3.0   Today's INR 2.3   Full warfarin instructions 2 mg on Mon, Wed, Fri; 1 mg all other days   Next INR check 10/9/2018    Indications   Long-term (current) use of anticoagulants [Z79.01] (Resolved) [Z79.01]  Atrial fibrillation/flutter (Resolved)         Your next Anticoagulation Clinic appointment(s)     Aug 28, 2018  4:00 PM CDT   Anticoagulation Visit with MIGUEL ANGEL DE LA CRUZ   [facility] ([facility]    [id number] [id number] [phone]            Oct 09, 2018  4:00 PM CDT   Anticoagulation Visit with MIGUEL ANGEL DE LA CRUZ   [facility] ([facility])    [address] [id number] [phone]              Contact Numbers     [facility]  Please call  [phone] to cancel and/or reschedule your appointment, or with any problems or questions regarding your therapy.        August 2018 Details    Sun Mon Tue Wed Thu Fri Sat        1               2               3               4                 5               6               7               8               9               10               11                 12               13               14               15               16               17               18                 19               20               21               22               23               24               25                 26               27               28      1 mg   See details      29      2 mg         30      1 mg         31      2 mg           Date Details   08/28 This INR check               How to take your warfarin dose     To take:  1 mg Take 1 of the 1 mg tablets.    To take:  2 mg  Take 2 of the 1 mg tablets.           September 2018 Details    Sun Mon Tue Wed Thu Fri Sat           1      1 mg           2      1 mg         3      2 mg         4      1 mg         5      2 mg         6      1 mg         7      2 mg         8      1 mg           9      1 mg         10      2 mg         11      1 mg         12      2 mg         13      1 mg         14      2 mg         15      1 mg           16      1 mg         17      2 mg         18      1 mg         19      2 mg         20      1 mg         21      2 mg         22      1 mg           23      1 mg         24      2 mg         25      1 mg         26      2 mg         27      1 mg         28      2 mg         29      1 mg           30      1 mg                Date Details   No additional details            How to take your warfarin dose     To take:  1 mg Take 1 of the 1 mg tablets.    To take:  2 mg Take 2 of the 1 mg tablets.           October 2018 Details    Sun Mon Tue Wed Thu Fri Sat      1      2 mg         2      1 mg         3      2 mg         4      1 mg         5      2 mg         6      1 mg           7      1 mg         8      2 mg         9            10               11               12               13                 14               15               16               17               18               19               20                 21               22               23               24               25               26               27                 28               29               30               31                   Date Details   No additional details    Date of next INR:  10/9/2018         How to take your warfarin dose     To take:  1 mg Take 1 of the 1 mg tablets.    To take:  2 mg Take 2 of the 1 mg tablets.

## 2018-08-28 NOTE — PROGRESS NOTES
ANTICOAGULATION FOLLOW-UP CLINIC VISIT    Patient Name:  Jenn Joseph  Date:  8/28/2018  Contact Type:  Face to Face    SUBJECTIVE:     Patient Findings     Positives No Problem Findings           OBJECTIVE    INR Protime   Date Value Ref Range Status   08/28/2018 2.3 (A) 0.86 - 1.14 Final       ASSESSMENT / PLAN  INR assessment THER    Recheck INR In: 6 WEEKS    INR Location Clinic      Anticoagulation Summary as of 8/28/2018     INR goal 2.0-3.0   Today's INR 2.3   Warfarin maintenance plan 2 mg (1 mg x 2) on Mon, Wed, Fri; 1 mg (1 mg x 1) all other days   Full warfarin instructions 2 mg on Mon, Wed, Fri; 1 mg all other days   Weekly warfarin total 10 mg   No change documented Argelia Xie, RN   Plan last modified Manjula Hamm RN (4/25/2018)   Next INR check 10/9/2018   Target end date     Indications   Long-term (current) use of anticoagulants [Z79.01] (Resolved) [Z79.01]  Atrial fibrillation/flutter (Resolved)         Anticoagulation Episode Summary     INR check location     Preferred lab     Send INR reminders to [facility]    Comments       Anticoagulation Care Providers     Provider Role Specialty Phone number    J Luistru Nayeli Maxwell PA-C Responsible Physician Assistant - Medical [phone]            See the Encounter Report to view Anticoagulation Flowsheet and Dosing Calendar (Go to Encounters tab in chart review, and find the Anticoagulation Therapy Visit)    Continue maintenance warfarin dosing. Denies any changes to medications or other diet issues. Denies any missed doses or extra doses. Denies bleeding, bruising, or blood clots. Verbalizes understanding of dosing and recheck date. Patient is aware if signs of clotting (pain, tenderness, swelling, color change in leg or arm, SOB) and bleeding occur (blood in stool, urine, large bruising, bleeding gums, nosebleeds) to have INR check sooner. If sx severe report to ER or concerned for stroke call 911. If general questions or  concerns arise, call clinic.         Argelia Xie RN

## 2018-09-10 DIAGNOSIS — Z79.01 LONG-TERM (CURRENT) USE OF ANTICOAGULANTS, INR GOAL 2.0-3.0: ICD-10-CM

## 2018-09-10 DIAGNOSIS — I48.20 CHRONIC ATRIAL FIBRILLATION (H): ICD-10-CM

## 2018-09-11 RX ORDER — WARFARIN SODIUM 1 MG/1
TABLET ORAL
Qty: 140 TABLET | Refills: 0 | Status: SHIPPED | OUTPATIENT
Start: 2018-09-11 | End: 2018-09-17

## 2018-09-11 NOTE — TELEPHONE ENCOUNTER
Prescription approved per [facility] [place] [place] Refill Protocol.    Argelia Xie RN

## 2018-09-11 NOTE — TELEPHONE ENCOUNTER
"Requested Prescriptions   Pending Prescriptions Disp Refills     warfarin (COUMADIN) 1 MG tablet [Pharmacy Med Name: Warfarin Sodium Oral Tablet 1 MG]  Last Written Prescription Date:  06/06/18  Last Fill Quantity: 140,  # refills: 0   Last Office Visit with FMHARDY, CHRIS or [facility] prescribing provider:  10/17/17Juliocesar   Future Office Visit:    140 tablet 0     Sig: Take 1 tablet (1 mg) by mouth on Sunday, Tuesday, Thursday and 2 tablets (2 mg) all other days of week or as directed by INR clinic.    Vitamin K Antagonists Failed    9/10/2018  9:19 PM       Failed - INR is within goal in the past 6 weeks    Confirm INR is within goal in the past 6 weeks.     Recent Labs   Lab Test 08/28/18   INR  2.3*                      Passed - Recent (12 mo) or future (30 days) visit within the authorizing provider's specialty    Patient had office visit in the last 12 months or has a visit in the next 30 days with authorizing provider or within the authorizing provider's specialty.  See \"Patient Info\" tab in inbasket, or \"Choose Columns\" in Meds & Orders section of the refill encounter.           Passed - Patient is 18 years of age or older       Passed - Patient is not pregnant       Passed - No positive pregnancy on file in past 12 months          "

## 2018-09-12 ENCOUNTER — DOCUMENTATION ONLY (OUTPATIENT)
Dept: LAB | Facility: CLINIC | Age: 73
End: 2018-09-12

## 2018-09-12 DIAGNOSIS — I10 BENIGN ESSENTIAL HYPERTENSION: Primary | ICD-10-CM

## 2018-09-12 NOTE — PROGRESS NOTES
Patient has an upcoming previsit appointment on 09/13/2018. Please review pended orders and add additional orders if needed.     Thank you,   Carin Suresh

## 2018-09-13 DIAGNOSIS — I10 BENIGN ESSENTIAL HYPERTENSION: ICD-10-CM

## 2018-09-14 LAB
ANION GAP SERPL CALCULATED.3IONS-SCNC: 5 MMOL/L (ref 3–14)
BUN SERPL-MCNC: 17 MG/DL (ref 7–30)
CALCIUM SERPL-MCNC: 8.9 MG/DL (ref 8.5–10.1)
CHLORIDE SERPL-SCNC: 105 MMOL/L (ref 94–109)
CHOLEST SERPL-MCNC: 194 MG/DL
CO2 SERPL-SCNC: 31 MMOL/L (ref 20–32)
CREAT SERPL-MCNC: 1.1 MG/DL (ref 0.52–1.04)
GFR SERPL CREATININE-BSD FRML MDRD: 49 ML/MIN/1.7M2
GLUCOSE SERPL-MCNC: 89 MG/DL (ref 70–99)
HDLC SERPL-MCNC: 65 MG/DL
LDLC SERPL CALC-MCNC: 102 MG/DL
NONHDLC SERPL-MCNC: 129 MG/DL
POTASSIUM SERPL-SCNC: 4.2 MMOL/L (ref 3.4–5.3)
SODIUM SERPL-SCNC: 141 MMOL/L (ref 133–144)
TRIGL SERPL-MCNC: 137 MG/DL

## 2018-09-14 PROCEDURE — 80048 BASIC METABOLIC PNL TOTAL CA: CPT | Performed by: PHYSICIAN ASSISTANT

## 2018-09-14 PROCEDURE — 36415 COLL VENOUS BLD VENIPUNCTURE: CPT | Performed by: PHYSICIAN ASSISTANT

## 2018-09-14 PROCEDURE — 80061 LIPID PANEL: CPT | Performed by: PHYSICIAN ASSISTANT

## 2018-09-17 ENCOUNTER — OFFICE VISIT (OUTPATIENT)
Dept: FAMILY MEDICINE | Facility: CLINIC | Age: 73
End: 2018-09-17
Payer: COMMERCIAL

## 2018-09-17 VITALS
TEMPERATURE: 98.5 F | HEART RATE: 66 BPM | DIASTOLIC BLOOD PRESSURE: 64 MMHG | RESPIRATION RATE: 18 BRPM | OXYGEN SATURATION: 94 % | WEIGHT: 137.9 LBS | HEIGHT: 62 IN | SYSTOLIC BLOOD PRESSURE: 130 MMHG | BODY MASS INDEX: 25.38 KG/M2

## 2018-09-17 DIAGNOSIS — Z12.31 ENCOUNTER FOR SCREENING MAMMOGRAM FOR BREAST CANCER: ICD-10-CM

## 2018-09-17 DIAGNOSIS — E78.2 MIXED HYPERLIPIDEMIA: ICD-10-CM

## 2018-09-17 DIAGNOSIS — I10 HYPERTENSION GOAL BP (BLOOD PRESSURE) < 140/90: ICD-10-CM

## 2018-09-17 DIAGNOSIS — I48.20 CHRONIC ATRIAL FIBRILLATION (H): ICD-10-CM

## 2018-09-17 DIAGNOSIS — Z79.01 LONG-TERM (CURRENT) USE OF ANTICOAGULANTS, INR GOAL 2.0-3.0: ICD-10-CM

## 2018-09-17 DIAGNOSIS — Z12.11 SPECIAL SCREENING FOR MALIGNANT NEOPLASMS, COLON: ICD-10-CM

## 2018-09-17 DIAGNOSIS — Z00.00 ROUTINE HISTORY AND PHYSICAL EXAMINATION OF ADULT: Primary | ICD-10-CM

## 2018-09-17 DIAGNOSIS — K21.00 GASTROESOPHAGEAL REFLUX DISEASE WITH ESOPHAGITIS: ICD-10-CM

## 2018-09-17 LAB
CREAT UR-MCNC: 52 MG/DL
MICROALBUMIN UR-MCNC: 5 MG/L
MICROALBUMIN/CREAT UR: 9.75 MG/G CR (ref 0–25)

## 2018-09-17 PROCEDURE — 82043 UR ALBUMIN QUANTITATIVE: CPT | Performed by: PHYSICIAN ASSISTANT

## 2018-09-17 PROCEDURE — G0439 PPPS, SUBSEQ VISIT: HCPCS | Performed by: PHYSICIAN ASSISTANT

## 2018-09-17 RX ORDER — LISINOPRIL 5 MG/1
5 TABLET ORAL DAILY
Qty: 90 TABLET | Refills: 3 | Status: SHIPPED | OUTPATIENT
Start: 2018-09-17 | End: 2020-02-11

## 2018-09-17 RX ORDER — WARFARIN SODIUM 1 MG/1
TABLET ORAL
Qty: 140 TABLET | Refills: 3 | Status: SHIPPED | OUTPATIENT
Start: 2018-09-17 | End: 2019-09-12

## 2018-09-17 RX ORDER — ATENOLOL 25 MG/1
12.5 TABLET ORAL DAILY
Qty: 45 TABLET | Refills: 3 | Status: SHIPPED | OUTPATIENT
Start: 2018-09-17 | End: 2020-12-03

## 2018-09-17 RX ORDER — PRAVASTATIN SODIUM 20 MG
20 TABLET ORAL DAILY
Qty: 90 TABLET | Refills: 3 | Status: SHIPPED | OUTPATIENT
Start: 2018-09-17 | End: 2020-12-03 | Stop reason: DRUGHIGH

## 2018-09-17 RX ORDER — PROPAFENONE HYDROCHLORIDE 425 MG/1
425 CAPSULE, EXTENDED RELEASE ORAL 2 TIMES DAILY
Qty: 180 CAPSULE | Refills: 3 | Status: SHIPPED | OUTPATIENT
Start: 2018-09-17 | End: 2021-10-28

## 2018-09-17 ASSESSMENT — PAIN SCALES - GENERAL: PAINLEVEL: NO PAIN (0)

## 2018-09-17 NOTE — PROGRESS NOTES
SUBJECTIVE:   Jenn Joseph is a 73 year old female who presents for Preventive Visit.  Are you in the first 12 months of your Medicare Part B coverage?  No    Healthy Habits:    Do you get at least three servings of calcium containing foods daily (dairy, green leafy vegetables, etc.)? yes    Amount of exercise or daily activities, outside of work: 2 day(s) per week    Problems taking medications regularly No    Medication side effects: No    Have you had an eye exam in the past two years? yes    Do you see a dentist twice per year? yes    Do you have sleep apnea, excessive snoring or daytime drowsiness?no      Ability to successfully perform activities of daily living: Yes, no assistance needed    Home safety:  none identified     Hearing impairment: No    Fall risk:  Fallen 2 or more times in the past year?: No  Any fall with injury in the past year?: No    COGNITIVE SCREEN  1) Repeat 3 items (Leader, Season, Table)    2) Clock draw: NORMAL  3) 3 item recall: Recalls 3 objects  Results: 3 items recalled: COGNITIVE IMPAIRMENT LESS LIKELY    Mini-CogTM Copyright S Biju. Licensed by the author for use in [facility] simplifyMD; reprinted with permission (soob@.[email or web address]). All rights reserved.      Hyperlipidemia Follow-Up      Rate your low fat/cholesterol diet?: good    Taking statin?  Yes, no muscle aches from statin    Other lipid medications/supplements?:  none    Hypertension Follow-up      Outpatient blood pressures are being checked at home.  Results are within normal limits .    Low Salt Diet: no added salt    BP Readings from Last 2 Encounters:   09/17/18 130/64   10/17/17 132/80     LDL Cholesterol Calculated (mg/dL)   Date Value   09/14/2018 102 (H)   10/12/2017 126 (H)       Reviewed and updated as needed this visit by clinical staff  Tobacco  Allergies  Meds  Med Hx  Surg Hx  Fam Hx  Soc Hx        Reviewed and updated as needed this visit by Provider        Social History   Substance Use  Topics     Smoking status: Former Smoker     Smokeless tobacco: Never Used     Alcohol use Yes      Comment: 1 - 2 PER WEEK       If you drink alcohol do you typically have >3 drinks per day or >7 drinks per week? No                        Today's PHQ-2 Score:   PHQ-2 ( 1999 [employer]) 9/17/2018 10/17/2017   Q1: Little interest or pleasure in doing things 0 0   Q2: Feeling down, depressed or hopeless 0 0   PHQ-2 Score 0 0   Q1: Little interest or pleasure in doing things - -   Q2: Feeling down, depressed or hopeless - -   PHQ-2 Score - -       Do you feel safe in your environment - Yes    Do you have a Health Care Directive?: Yes: Advance Directive has been received and scanned.    Current providers sharing in care for this patient include:   Patient Care Team:  Taylor Mehta PA-C as PCP - General (Physician Assistant)    The following health maintenance items are reviewed in Epic and correct as of today:  Health Maintenance   Topic Date Due     OP ANNUAL INR REFERRAL  01/20/2017     INFLUENZA VACCINE (1) 09/01/2018     PHQ-2 Q1 YR  10/17/2018     MAMMO SCREEN Q2 YR (SYSTEM ASSIGNED)  05/10/2019     BMP Q1 YR  09/14/2019     LIPID MONITORING Q1 YEAR  09/14/2019     FALL RISK ASSESSMENT  09/17/2019     MICROALBUMIN Q1 YEAR  09/17/2019     FIT Q1 YR  09/20/2019     ADVANCE DIRECTIVE PLANNING Q5 YRS  07/31/2022     TETANUS IMMUNIZATION (SYSTEM ASSIGNED)  10/03/2022     DEXA SCAN SCREENING (SYSTEM ASSIGNED)  Completed     PNEUMOCOCCAL  Completed     HEPATITIS C SCREENING  Completed     BP Readings from Last 3 Encounters:   09/17/18 130/64   10/17/17 132/80   07/31/17 120/68    Wt Readings from Last 3 Encounters:   09/17/18 137 lb 14.4 oz (62.6 kg)   10/17/17 142 lb (64.4 kg)   07/31/17 138 lb 6.4 oz (62.8 kg)                  Patient Active Problem List   Diagnosis     GERD (gastroesophageal reflux disease)     CARDIOVASCULAR SCREENING; LDL GOAL LESS THAN 130     Hypertension goal BP (blood pressure) <  140/90     Long-term (current) use of anticoagulants, INR goal 2.0-3.0     Advanced directives, counseling/discussion     CKD (chronic kidney disease) stage 3, GFR 30-59 ml/min     Health Care Home     Seborrhea     Eyelid dermatitis, eczematous     Uterine prolapse     Paroxysmal atrial fibrillation (H)     Pure hypercholesterolemia     Past Surgical History:   Procedure Laterality Date     D & C  80'S     HYSTERECTOMY  11-5-15     ROTATOR CUFF REPAIR RT/LT  2007    LEFT     TUBAL LIGATION  80'S       Social History   Substance Use Topics     Smoking status: Former Smoker     Smokeless tobacco: Never Used     Alcohol use Yes      Comment: 1 - 2 PER WEEK     Family History   Problem Relation Age of Onset     Hypertension Mother      Cerebrovascular Disease Mother      Thyroid Disease Mother      Cerebrovascular Disease Maternal Grandfather      Hypertension Brother      Musculoskeletal Disorder Brother      FIBROMYALGIA     HEART DISEASE Brother      Cardiovascular Brother      ATRIAL FIB     Musculoskeletal Disorder Sister      FIBROMYALGIA     Thyroid Disease Sister      Allergies Son      HEART DISEASE Brother      Cardiovascular Brother      HEART DISEASE Brother      Cardiovascular Brother      HEART DISEASE Brother      Cardiovascular Brother      HEART DISEASE Father      Cardiovascular Brother      ATRIAL FIB     Cardiovascular Brother      ATRIAL FIB     Cardiovascular Brother      ATRIAL FIB     Cardiovascular Brother      ATRIAL FIB         Current Outpatient Prescriptions   Medication Sig Dispense Refill     atenolol (TENORMIN) 25 MG tablet Take 0.5 tablets (12.5 mg) by mouth daily 45 tablet 3     Calcium Carbonate Antacid (TUMS PO) Take  by mouth. prn       Calcium-Magnesium-Zinc 333-133-5 MG TABS Take by mouth 2 times daily        Cholecalciferol (VITAMIN D) 1000 UNITS capsule Take 1 capsule by mouth daily.       Coenzyme Q10 (CO Q-10) 200 MG CAPS Take 200 mg by mouth daily 90 capsule 3     desonide  "(DESOWEN) 0.05 % ointment Apply small amount twice daily to the eyelids for up to two weeks at a time. Take caution to not get it in the eyes. (Patient taking differently: as needed Apply small amount twice daily to the eyelids for up to two weeks at a time. Take caution to not get it in the eyes.) 15 g 3     lisinopril (PRINIVIL/ZESTRIL) 5 MG tablet Take 1 tablet (5 mg) by mouth daily 90 tablet 3     Multiple Vitamins-Minerals (MULTIVITAL PO) Once a day       order for DME Equipment being ordered: SP boot walker. 1 each 0     pravastatin (PRAVACHOL) 20 MG tablet Take 1 tablet (20 mg) by mouth daily 90 tablet 3     propafenone (RYTHMOL SR) 425 MG 12 hr capsule Take 1 capsule (425 mg) by mouth 2 times daily 180 capsule 3     ranitidine (ZANTAC) 150 MG tablet Take 1 tablet (150 mg) by mouth 2 times daily as needed for heartburn 180 tablet 3     warfarin (COUMADIN) 1 MG tablet TAKE 2 MG (2 TABS) MON, WED, FRI AND 1 MG (1 TAB) ALL OTHER DAYS OR PER DIRECTION OF INR NURSE 140 tablet 3     probiotic CAPS 2 times weekly         Mammogram Screening: Patient over age 50, mutual decision to screen reflected in health maintenance.    ROS:  Constitutional, HEENT, cardiovascular, pulmonary, GI, , musculoskeletal, neuro, skin, endocrine and psych systems are negative, except as otherwise noted.    OBJECTIVE:   /64 (BP Location: Right arm, Patient Position: Sitting, Cuff Size: Adult Regular)  Pulse 66  Temp 98.5  F (36.9  C) (Oral)  Resp 18  Ht 5' 2.25\" (1.581 m)  Wt 137 lb 14.4 oz (62.6 kg)  SpO2 94%  BMI 25.02 kg/m2 Estimated body mass index is 25.02 kg/(m^2) as calculated from the following:    Height as of this encounter: 5' 2.25\" (1.581 m).    Weight as of this encounter: 137 lb 14.4 oz (62.6 kg).  EXAM:   GENERAL APPEARANCE: healthy, alert and no distress  EYES: Eyes grossly normal to inspection, PERRL and conjunctivae and sclerae normal  HENT: ear canals and TM's normal, nose and mouth without ulcers or " lesions, oropharynx clear and oral mucous membranes moist  NECK: no adenopathy, no asymmetry, masses, or scars and thyroid normal to palpation  RESP: lungs clear to auscultation - no rales, rhonchi or wheezes  BREAST: normal without masses, tenderness or nipple discharge and no palpable axillary masses or adenopathy  CV: regular rate and rhythm, normal S1 S2, no S3 or S4, no murmur, click or rub, no peripheral edema and peripheral pulses strong  ABDOMEN: soft, nontender, no hepatosplenomegaly, no masses and bowel sounds normal  MS: no musculoskeletal defects are noted and gait is age appropriate without ataxia  SKIN: no suspicious lesions or rashes  NEURO: Normal strength and tone, sensory exam grossly normal, mentation intact and speech normal  PSYCH: mentation appears normal and affect normal/bright    Diagnostic Test Results:  Results for orders placed or performed in visit on 09/17/18   Albumin Random Urine Quantitative with Creat Ratio   Result Value Ref Range    Creatinine Urine 52 mg/dL    Albumin Urine mg/L 5 mg/L    Albumin Urine mg/g Cr 9.75 0 - 25 mg/g Cr       ASSESSMENT / PLAN:       ICD-10-CM    1. Routine history and physical examination of adult Z00.00 Albumin Random Urine Quantitative with Creat Ratio   2. Special screening for malignant neoplasms, colon Z12.11 Fecal colorectal cancer screen (FIT)   3. Hypertension goal BP (blood pressure) < 140/90 I10 lisinopril (PRINIVIL/ZESTRIL) 5 MG tablet     atenolol (TENORMIN) 25 MG tablet     propafenone (RYTHMOL SR) 425 MG 12 hr capsule   4. Gastroesophageal reflux disease with esophagitis K21.0 ranitidine (ZANTAC) 150 MG tablet   5. Long-term (current) use of anticoagulants, INR goal 2.0-3.0 Z79.01 warfarin (COUMADIN) 1 MG tablet   6. Chronic atrial fibrillation (H) I48.2 warfarin (COUMADIN) 1 MG tablet   7. Mixed hyperlipidemia E78.2 pravastatin (PRAVACHOL) 20 MG tablet   8. Encounter for screening mammogram for breast cancer Z12.31 *MA Screening Digital  "Bilateral     HTN is stable-continue current medication    End of Life Planning:  Patient currently has an advanced directive: Yes.  Practitioner is supportive of decision.    COUNSELING:  Reviewed preventive health counseling, as reflected in patient instructions       Regular exercise       Healthy diet/nutrition       Immunizations    Declined: Pneumococcal due to Concerns about side effects/safety            BP Readings from Last 1 Encounters:   09/17/18 130/64     Estimated body mass index is 25.02 kg/(m^2) as calculated from the following:    Height as of this encounter: 5' 2.25\" (1.581 m).    Weight as of this encounter: 137 lb 14.4 oz (62.6 kg).           reports that she has quit smoking. She has never used smokeless tobacco.      Appropriate preventive services were discussed with this patient, including applicable screening as appropriate for cardiovascular disease, diabetes, osteopenia/osteoporosis, and glaucoma.  As appropriate for age/gender, discussed screening for colorectal cancer, prostate cancer, breast cancer, and cervical cancer. Checklist reviewing preventive services available has been given to the patient.    Reviewed patients plan of care and provided an AVS. The Basic Care Plan (routine screening as documented in Health Maintenance) for Jenn meets the Care Plan requirement. This Care Plan has been established and reviewed with the Patient.    Counseling Resources:  ATP IV Guidelines  Pooled Cohorts Equation Calculator  Breast Cancer Risk Calculator  FRAX Risk Assessment  ICSI Preventive Guidelines  Dietary Guidelines for Americans, 2010  USDA's MyPlate  ASA Prophylaxis  Lung CA Screening    Taylor Mehta PA-C  [facility]  "

## 2018-09-17 NOTE — MR AVS SNAPSHOT
After Visit Summary   9/17/2018    Jenn Joseph    MRN: [id number]           Patient Information     Date Of Birth          1945        Visit Information        Provider Department      9/17/2018 10:00 AM Taylor Mehta PA-C [facility]        Today's Diagnoses     Routine history and physical examination of adult    -  1    Special screening for malignant neoplasms, colon        Hypertension goal BP (blood pressure) < 140/90        Gastroesophageal reflux disease with esophagitis        Long-term (current) use of anticoagulants, INR goal 2.0-3.0        Chronic atrial fibrillation (H)        Mixed hyperlipidemia        Encounter for screening mammogram for breast cancer          Care Instructions      Preventive Health Recommendations  Female Ages 65 +    Yearly exam:     See your health care provider every year in order to  o Review health changes.   o Discuss preventive care.    o Review your medicines if your doctor has prescribed any.      You no longer need a yearly Pap test unless you've had an abnormal Pap test in the past 10 years. If you have vaginal symptoms, such as bleeding or discharge, be sure to talk with your provider about a Pap test.      Every 1 to 2 years, have a mammogram.  If you are over 69, talk with your health care provider about whether or not you want to continue having screening mammograms.      Every 10 years, have a colonoscopy. Or, have a yearly FIT test (stool test). These exams will check for colon cancer.       Have a cholesterol test every 5 years, or more often if your doctor advises it.       Have a diabetes test (fasting glucose) every three years. If you are at risk for diabetes, you should have this test more often.       At age 65, have a bone density scan (DEXA) to check for osteoporosis (brittle bone disease).    Shots:    Get a flu shot each year.    Get a tetanus shot every 10 years.    Talk to your doctor about  your pneumonia vaccines. There are now two you should receive - Pneumovax (PPSV 23) and Prevnar (PCV 13).    Talk to your pharmacist about the shingles vaccine.    Talk to your doctor about the hepatitis B vaccine.    Nutrition:     Eat at least 5 servings of fruits and vegetables each day.      Eat whole-grain bread, whole-wheat pasta and brown rice instead of white grains and rice.      Get adequate about Calcium and Vitamin D.     Lifestyle    Exercise at least 150 minutes a week (30 minutes a day, 5 days a week). This will help you control your weight and prevent disease.      Limit alcohol to one drink per day.      No smoking.       Wear sunscreen to prevent skin cancer.       See your dentist twice a year for an exam and cleaning.      See your eye doctor every 1 to 2 years to screen for conditions such as glaucoma, macular degeneration, cataracts, etc           Follow-ups after your visit        Your next 10 appointments already scheduled     Oct 09, 2018  4:00 PM CDT   [facility] TERESITA [facility]    [address] [place] [id number] [phone]              Future tests that were ordered for you today     Open Future Orders        Priority Expected Expires Ordered    *MA Screening Digital Bilateral Routine  9/17/2019 9/17/2018    Fecal colorectal cancer screen (FIT) Routine 10/8/2018 12/10/2018 9/17/2018            Who to contact     If you have questions or need follow up information about today's clinic visit or your schedule please contact [facility] directly at [phone].  Normal or non-critical lab and imaging results will be communicated to you by MyChart, letter or phone within 4 business days after the clinic has received the results. If you do not hear from us within 7 days, please contact the clinic through MyChart or phone. If you have a critical or abnormal lab result, we will  "notify you by phone as soon as possible.  Submit refill requests through [employer] [facility] or call your pharmacy and they will forward the refill request to us. Please allow 3 business days for your refill to be completed.          Additional Information About Your Visit        [employer] Information     [employer] gives you secure access to your electronic health record. If you see a primary care provider, you can also send messages to your care team and make appointments. If you have questions, please call your primary care clinic.  If you do not have a primary care provider, please call [phone] and they will assist you.        Care EveryWhere ID     This is your Care EveryWhere ID. This could be used by other organizations to access your [facility] medical records  [id number]        Your Vitals Were     Pulse Temperature Respirations Height Pulse Oximetry BMI (Body Mass Index)    66 98.5  F (36.9  C) (Oral) 18 5' 2.25\" (1.581 m) 94% 25.02 kg/m2       Blood Pressure from Last 3 Encounters:   09/17/18 130/64   10/17/17 132/80   07/31/17 120/68    Weight from Last 3 Encounters:   09/17/18 137 lb 14.4 oz (62.6 kg)   10/17/17 142 lb (64.4 kg)   07/31/17 138 lb 6.4 oz (62.8 kg)              We Performed the Following     Albumin Random Urine Quantitative with Creat Ratio          Today's Medication Changes          These changes are accurate as of 9/17/18 10:57 AM.  If you have any questions, ask your nurse or doctor.               These medicines have changed or have updated prescriptions.        Dose/Directions    atenolol 25 MG tablet   Commonly known as:  TENORMIN   This may have changed:  Another medication with the same name was removed. Continue taking this medication, and follow the directions you see here.   Used for:  Hypertension goal BP (blood pressure) < 140/90   Changed by:  Taylor Mehta PA-C        Dose:  12.5 mg   Take 0.5 tablets (12.5 mg) by mouth daily   Quantity:  45 tablet   Refills:  3    "    desonide 0.05 % ointment   Commonly known as:  DESOWEN   This may have changed:    - when to take this  - reasons to take this  - additional instructions   Used for:  Eyelid dermatitis, eczematous, unspecified laterality        Apply small amount twice daily to the eyelids for up to two weeks at a time. Take caution to not get it in the eyes.   Quantity:  15 g   Refills:  3            Where to get your medicines      These medications were sent to [id number] - [place], [place] - [address]  [address] [address]     Phone:  [phone]     atenolol 25 MG tablet    lisinopril 5 MG tablet    pravastatin 20 MG tablet    propafenone 425 MG 12 hr capsule    ranitidine 150 MG tablet    warfarin 1 MG tablet                Primary Care Provider Office Phone # Fax #    Taylor Mehta PA-C [phone]       [address]  [facility] [address]        Equal Access to Services     [facility] : Hadii aad ku hadasho Soomaali, waaxda luqadaha, qaybta kaalmada adeegyada, waxay idiin hayaan adeeg kharashree liz . So [place] -[phone].    ATENCIÓN: Si habla español, tiene a williamson disposición servicios gratuitos de asistencia lingüística. [facility] [phone].    We comply with applicable federal civil rights laws and [place] laws. We do not discriminate on the basis of race, color, national origin, age, disability, sex, sexual orientation, or gender identity.            Thank you!     Thank you for choosing [facility]  for your care. Our goal is always to provide you with excellent care. Hearing back from our patients is one way we can continue to improve our services. Please take a few minutes to complete the written survey that you may receive in the mail after your visit with us. Thank you!             Your Updated Medication List - Protect others around you: Learn how to safely use, store and throw away your medicines at  www.disposemymeds.org.          This list is accurate as of 9/17/18 10:57 AM.  Always use your most recent med list.                   Brand Name Dispense Instructions for use Diagnosis    atenolol 25 MG tablet    TENORMIN    45 tablet    Take 0.5 tablets (12.5 mg) by mouth daily    Hypertension goal BP (blood pressure) < 140/90       Calcium-Magnesium-Zinc 333-133-5 MG Tabs per tablet      Take by mouth 2 times daily        Co Q-10 200 MG Caps     90 capsule    Take 200 mg by mouth daily    Routine general medical examination at a health care facility       desonide 0.05 % ointment    DESOWEN    15 g    Apply small amount twice daily to the eyelids for up to two weeks at a time. Take caution to not get it in the eyes.    Eyelid dermatitis, eczematous, unspecified laterality       lisinopril 5 MG tablet    PRINIVIL/ZESTRIL    90 tablet    Take 1 tablet (5 mg) by mouth daily    Hypertension goal BP (blood pressure) < 140/90       MULTIVITAL PO      Once a day        order for DME     1 each    Equipment being ordered: SP boot walker.    Left foot pain       pravastatin 20 MG tablet    PRAVACHOL    90 tablet    Take 1 tablet (20 mg) by mouth daily    Mixed hyperlipidemia       probiotic Caps      2 times weekly        propafenone 425 MG 12 hr capsule    RYTHMOL SR    180 capsule    Take 1 capsule (425 mg) by mouth 2 times daily    Hypertension goal BP (blood pressure) < 140/90       ranitidine 150 MG tablet    ZANTAC    180 tablet    Take 1 tablet (150 mg) by mouth 2 times daily as needed for heartburn    Gastroesophageal reflux disease with esophagitis       TUMS PO      Take  by mouth. prn        vitamin D 1000 units capsule      Take 1 capsule by mouth daily.        warfarin 1 MG tablet    COUMADIN    140 tablet    TAKE 2 MG (2 TABS) MON, WED, FRI AND 1 MG (1 TAB) ALL OTHER DAYS OR PER DIRECTION OF INR NURSE    Long-term (current) use of anticoagulants, INR goal 2.0-3.0, Chronic atrial fibrillation (H)

## 2018-09-20 DIAGNOSIS — Z12.11 SPECIAL SCREENING FOR MALIGNANT NEOPLASMS, COLON: ICD-10-CM

## 2018-09-20 LAB — HEMOCCULT STL QL IA: NEGATIVE

## 2018-09-20 PROCEDURE — 82274 ASSAY TEST FOR BLOOD FECAL: CPT | Performed by: PHYSICIAN ASSISTANT

## 2018-10-09 ENCOUNTER — ANTICOAGULATION THERAPY VISIT (OUTPATIENT)
Dept: NURSING | Facility: CLINIC | Age: 73
End: 2018-10-09
Payer: COMMERCIAL

## 2018-10-09 LAB — INR POINT OF CARE: 2.7 (ref 0.86–1.14)

## 2018-10-09 PROCEDURE — 99207 ZZC NO CHARGE NURSE ONLY: CPT

## 2018-10-09 PROCEDURE — 36416 COLLJ CAPILLARY BLOOD SPEC: CPT

## 2018-10-09 PROCEDURE — 85610 PROTHROMBIN TIME: CPT | Mod: QW

## 2018-10-09 NOTE — PROGRESS NOTES
ANTICOAGULATION FOLLOW-UP CLINIC VISIT    Patient Name:  Jenn Joseph  Date:  10/9/2018  Contact Type:  Face to Face    SUBJECTIVE:     Patient Findings     Positives No Problem Findings           OBJECTIVE    INR Protime   Date Value Ref Range Status   10/09/2018 2.7 (A) 0.86 - 1.14 Final       ASSESSMENT / PLAN  INR assessment THER    Recheck INR In: 6 WEEKS    INR Location Clinic      Anticoagulation Summary as of 10/9/2018     INR goal 2.0-3.0   Today's INR 2.7   Warfarin maintenance plan 2 mg (1 mg x 2) on Mon, Wed, Fri; 1 mg (1 mg x 1) all other days   Full warfarin instructions 2 mg on Mon, Wed, Fri; 1 mg all other days   Weekly warfarin total 10 mg   No change documented Artemio Rasheed, RN   Plan last modified Manjula Hamm RN (4/25/2018)   Next INR check 11/27/2018   Target end date     Indications   Long-term (current) use of anticoagulants [Z79.01] (Resolved) [Z79.01]  Atrial fibrillation/flutter (Resolved)         Anticoagulation Episode Summary     INR check location     Preferred lab     Send INR reminders to [facility]    Comments       Anticoagulation Care Providers     Provider Role Specialty Phone number    J Luistru Nayeli Maxwell PA-C Responsible Physician Assistant - Medical [phone]            See the Encounter Report to view Anticoagulation Flowsheet and Dosing Calendar (Go to Encounters tab in chart review, and find the Anticoagulation Therapy Visit)    Denies any changes to medications or other diet issues. Denies any missed doses or extra doses. Denies bleeding, bruising, or blood clots. Verbalizes understanding of dosing and recheck date. Patient is aware if signs of clotting (pain, tenderness, swelling, color change in leg or arm, SOB) and bleeding occur (blood in stool, urine, large bruising, bleeding gums, nosebleeds) to have INR check sooner. If sx severe report to ER or concerned for stroke call 911. If general questions or concerns arise, call clinic.    Artemio  Kathya RN, BSN

## 2018-10-09 NOTE — MR AVS SNAPSHOT
Jenn TRAMMELL Jake   10/9/2018 4:00 PM   Anticoagulation Therapy Visit    Description:  73 year old female   Provider:  MIGUEL ANGEL DE LA CRUZ   Department:  [facility]           INR as of 10/9/2018     Today's INR 2.7      Anticoagulation Summary as of 10/9/2018     INR goal 2.0-3.0   Today's INR 2.7   Full warfarin instructions 2 mg on Mon, Wed, Fri; 1 mg all other days   Next INR check 11/27/2018    Indications   Long-term (current) use of anticoagulants [Z79.01] (Resolved) [Z79.01]  Atrial fibrillation/flutter (Resolved)         Your next Anticoagulation Clinic appointment(s)     Nov 27, 2018 10:00 AM CST   Anticoagulation Visit with MIGUEL ANGEL DE LA CRUZ   [facility]    [address] [place] [id number] [phone]              Contact Numbers     [facility]  Please call  [phone] to cancel and/or reschedule your appointment, or with any problems or questions regarding your therapy.        October 2018 Details    Sun Mon Tue Wed Thu Fri Sat      1               2               3               4               5               6                 7               8               9      1 mg   See details      10      2 mg         11      1 mg         12      2 mg         13      1 mg           14      1 mg         15      2 mg         16      1 mg         17      2 mg         18      1 mg         19      2 mg         20      1 mg           21      1 mg         22      2 mg         23      1 mg         24      2 mg         25      1 mg         26      2 mg         27      1 mg           28      1 mg         29      2 mg         30      1 mg         31      2 mg             Date Details   10/09 This INR check               How to take your warfarin dose     To take:  1 mg Take 1 of the 1 mg tablets.    To take:  2 mg Take 2 of the 1 mg tablets.           November 2018 Details    Sun Mon Tue Wed Thu Fri Sat         1      1 mg         2      2 mg          3      1 mg           4      1 mg         5      2 mg         6      1 mg         7      2 mg         8      1 mg         9      2 mg         10      1 mg           11      1 mg         12      2 mg         13      1 mg         14      2 mg         15      1 mg         16      2 mg         17      1 mg           18      1 mg         19      2 mg         20      1 mg         21      2 mg         22      1 mg         23      2 mg         24      1 mg           25      1 mg         26      2 mg         27            28               29               30                 Date Details   No additional details    Date of next INR:  11/27/2018         How to take your warfarin dose     To take:  1 mg Take 1 of the 1 mg tablets.    To take:  2 mg Take 2 of the 1 mg tablets.

## 2018-11-27 ENCOUNTER — ANTICOAGULATION THERAPY VISIT (OUTPATIENT)
Dept: NURSING | Facility: CLINIC | Age: 73
End: 2018-11-27
Payer: COMMERCIAL

## 2018-11-27 ENCOUNTER — TELEPHONE (OUTPATIENT)
Dept: FAMILY MEDICINE | Facility: CLINIC | Age: 73
End: 2018-11-27

## 2018-11-27 DIAGNOSIS — I48.20 CHRONIC ATRIAL FIBRILLATION (H): Primary | ICD-10-CM

## 2018-11-27 LAB — INR POINT OF CARE: 2 (ref 0.86–1.14)

## 2018-11-27 PROCEDURE — 85610 PROTHROMBIN TIME: CPT | Mod: QW

## 2018-11-27 PROCEDURE — 99207 ZZC NO CHARGE NURSE ONLY: CPT

## 2018-11-27 PROCEDURE — 36416 COLLJ CAPILLARY BLOOD SPEC: CPT

## 2018-11-27 NOTE — PROGRESS NOTES
ANTICOAGULATION FOLLOW-UP CLINIC VISIT    Patient Name:  Jenn Joseph  Date:  11/27/2018  Contact Type:  Face to Face    SUBJECTIVE:     Patient Findings     Positives No Problem Findings           OBJECTIVE    INR Protime   Date Value Ref Range Status   11/27/2018 2.0 (A) 0.86 - 1.14 Final       ASSESSMENT / PLAN  INR assessment THER    Recheck INR In: 6 WEEKS    INR Location Clinic      Anticoagulation Summary as of 11/27/2018     INR goal 2.0-3.0   Today's INR 2.0   Warfarin maintenance plan 2 mg (1 mg x 2) on Mon, Wed, Fri; 1 mg (1 mg x 1) all other days   Full warfarin instructions 2 mg on Mon, Wed, Fri; 1 mg all other days   Weekly warfarin total 10 mg   No change documented Argelia Xie, RN   Plan last modified Mnajula Hamm RN (4/25/2018)   Next INR check 1/8/2019   Target end date     Indications   Long-term (current) use of anticoagulants [Z79.01] (Resolved) [Z79.01]  Atrial fibrillation/flutter (Resolved)         Anticoagulation Episode Summary     INR check location     Preferred lab     Send INR reminders to [facility] CLINIC    Comments       Anticoagulation Care Providers     Provider Role Specialty Phone number    J Luistru Nayeli Maxwell PA-C Responsible Physician Assistant - Medical [phone]            See the Encounter Report to view Anticoagulation Flowsheet and Dosing Calendar (Go to Encounters tab in chart review, and find the Anticoagulation Therapy Visit)    Continue maintenance warfarin dosing. Denies any changes to medications or other diet issues. Denies any missed doses or extra doses. Denies bleeding, bruising, or blood clots. Verbalizes understanding of dosing and recheck date. Patient is aware if signs of clotting (pain, tenderness, swelling, color change in leg or arm, SOB) and bleeding occur (blood in stool, urine, large bruising, bleeding gums, nosebleeds) to have INR check sooner. If sx severe report to ER or concerned for stroke call 911. If general questions or  concerns arise, call clinic.       Argelia Xie RN

## 2018-11-27 NOTE — PATIENT INSTRUCTIONS
Some signs and symptoms of bleeding include: Nose bleed or cut that does not stop bleeding in 10 minutes, bleeding of the gums, vomiting (will look like coffee grounds) or coughing up blood, unusual, easy or large areas of bruising, increased or unexpected vaginal bleeding or increased menstrual flow, red or black stools, red or orange urine, prolonged or severe headache, pale skin, unusual or constant tiredness.  If you have these please call 911 or seek medical care immediately.        Some signs and symptoms of clots include: pain or tenderness in arm or leg, swelling in arm or leg, changes in skin color, or area is warm to touch, shortness or breath, trouble breathing.  Numbness or weakness especially on 1 side of the body, sudden trouble speaking or swallowing, sudden trouble seeing, sudden confusion, dizzy spells or headache.  If you have these please call 911 or seek medical care immediately.

## 2018-11-27 NOTE — TELEPHONE ENCOUNTER
Has the patient previously taken warfarin? Yes  If yes, for what indication? Atrial fib     Does the patient have any of the following indications for a higher range of 2.5-3.5:    Mitral position mechanical valve? No    Rosa-Shiley, Ball and Cage or Monoleaflet valve (regardless of position) No    Other (if yes, please explain) No     Please sign INR referral. Please review and complete drop boxes.      Indication for Anticoagulation:  If nonstandard INR is desired, indicate goal range and explanation:   Expected Duration of Therapy:      Send back to [facility] anticoagulation.    Appreciated,  Argelia Xie, RN

## 2018-11-27 NOTE — MR AVS SNAPSHOT
Jenn TRAMMELL Jake   11/27/2018 10:00 AM   Anticoagulation Therapy Visit    Description:  73 year old female   Provider:  MIGUEL ANGEL DE LA CRUZ   Department:  [facility]           INR as of 11/27/2018     Today's INR 2.0      Anticoagulation Summary as of 11/27/2018     INR goal 2.0-3.0   Today's INR 2.0   Full warfarin instructions 2 mg on Mon, Wed, Fri; 1 mg all other days   Next INR check 1/8/2019    Indications   Long-term (current) use of anticoagulants [Z79.01] (Resolved) [Z79.01]  Atrial fibrillation/flutter (Resolved)         Instructions    Some signs and symptoms of bleeding include: Nose bleed or cut that does not stop bleeding in 10 minutes, bleeding of the gums, vomiting (will look like coffee grounds) or coughing up blood, unusual, easy or large areas of bruising, increased or unexpected vaginal bleeding or increased menstrual flow, red or black stools, red or orange urine, prolonged or severe headache, pale skin, unusual or constant tiredness.  If you have these please call 911 or seek medical care immediately.        Some signs and symptoms of clots include: pain or tenderness in arm or leg, swelling in arm or leg, changes in skin color, or area is warm to touch, shortness or breath, trouble breathing.  Numbness or weakness especially on 1 side of the body, sudden trouble speaking or swallowing, sudden trouble seeing, sudden confusion, dizzy spells or headache.  If you have these please call 911 or seek medical care immediately.           Your next Anticoagulation Clinic appointment(s)     Jan 08, 2019  2:00 PM CST   Anticoagulation Visit with MIGUEL ANGEL DE LA CRUZ   [facility]    [address] [place] [id number] [phone]              Contact Numbers     [facility]  Please call  [phone] to cancel and/or reschedule your appointment, or with any problems or questions regarding your therapy.        November 2018 Details    Sun  Mon Tue Wed Thu Fri Sat         1               2               3                 4               5               6               7               8               9               10                 11               12               13               14               15               16               17                 18               19               20               21               22               23               24                 25               26               27      1 mg   See details      28      2 mg         29      1 mg         30      2 mg           Date Details   11/27 This INR check               How to take your warfarin dose     To take:  1 mg Take 1 of the 1 mg tablets.    To take:  2 mg Take 2 of the 1 mg tablets.           December 2018 Details    Sun Mon Tue Wed u Fri Sat           1      1 mg           2      1 mg         3      2 mg         4      1 mg         5      2 mg         6      1 mg         7      2 mg         8      1 mg           9      1 mg         10      2 mg         11      1 mg         12      2 mg         13      1 mg         14      2 mg         15      1 mg           16      1 mg         17      2 mg         18      1 mg         19      2 mg         20      1 mg         21      2 mg         22      1 mg           23      1 mg         24      2 mg         25      1 mg         26      2 mg         27      1 mg         28      2 mg         29      1 mg           30      1 mg         31      2 mg               Date Details   No additional details            How to take your warfarin dose     To take:  1 mg Take 1 of the 1 mg tablets.    To take:  2 mg Take 2 of the 1 mg tablets.           January 2019 Details    Sun Mon Tue Wed Thu Fri Sat       1      1 mg         2      2 mg         3      1 mg         4      2 mg         5      1 mg           6      1 mg         7      2 mg         8            9               10               11               12                 13                14               15               16               17               18               19                 20               21               22               23               24               25               26                 27               28               29               30               31                  Date Details   No additional details    Date of next INR:  1/8/2019         How to take your warfarin dose     To take:  1 mg Take 1 of the 1 mg tablets.    To take:  2 mg Take 2 of the 1 mg tablets.

## 2019-01-08 ENCOUNTER — TELEPHONE (OUTPATIENT)
Dept: FAMILY MEDICINE | Facility: CLINIC | Age: 74
End: 2019-01-08

## 2019-01-10 ENCOUNTER — ANTICOAGULATION THERAPY VISIT (OUTPATIENT)
Dept: NURSING | Facility: CLINIC | Age: 74
End: 2019-01-10
Payer: COMMERCIAL

## 2019-01-10 LAB — INR POINT OF CARE: 2 (ref 0.86–1.14)

## 2019-01-10 PROCEDURE — 36416 COLLJ CAPILLARY BLOOD SPEC: CPT

## 2019-01-10 PROCEDURE — 85610 PROTHROMBIN TIME: CPT | Mod: QW

## 2019-01-10 PROCEDURE — 99207 ZZC NO CHARGE NURSE ONLY: CPT

## 2019-01-10 NOTE — PROGRESS NOTES
ANTICOAGULATION FOLLOW-UP CLINIC VISIT    Patient Name:  Jenn Joseph  Date:  1/10/2019  Contact Type:  Face to Face    SUBJECTIVE:     Patient Findings     Positives:   No Problem Findings           OBJECTIVE    INR Protime   Date Value Ref Range Status   01/10/2019 2.0 (A) 0.86 - 1.14 Final       ASSESSMENT / PLAN  INR assessment THER    Recheck INR In: 6 WEEKS    INR Location Clinic      Anticoagulation Summary  As of 1/10/2019    INR goal:   2.0-3.0   TTR:   93.8 % (2.7 y)   INR used for dosin.0 (1/10/2019)   Warfarin maintenance plan:   2 mg (1 mg x 2) every Mon, Wed, Fri; 1 mg (1 mg x 1) all other days   Full warfarin instructions:   2 mg every Mon, Wed, Fri; 1 mg all other days   Weekly warfarin total:   10 mg   No change documented:   Kim Zhang, RN   Plan last modified:   Manjula Hamm RN (2018)   Next INR check:   2019   Target end date:       Indications    Long-term (current) use of anticoagulants [Z79.01] (Resolved) [Z79.01]  Atrial fibrillation/flutter (Resolved)             Anticoagulation Episode Summary     INR check location:       Preferred lab:       Send INR reminders to:   [facility]    Comments:   every 6 weeks      Anticoagulation Care Providers     Provider Role Specialty Phone number    Taylor Mehta PA-C Responsible Physician Assistant [phone]            See the Encounter Report to view Anticoagulation Flowsheet and Dosing Calendar (Go to Encounters tab in chart review, and find the Anticoagulation Therapy Visit)    Therapeutic INR 2.0. Will continue maintenance dose and recheck in 6 week(s).    Patient states negative for signs and symptoms of bleeding or blood clots, changes in medication, changes in diet, any signs of infection or antibiotic use, anything new OTC or herbal medications, any missed or extra doses of the warfarin.    Patient informed of the symptoms to be seen for either by INR nurse or ER.        Kim CANA[place]  RD Zhang

## 2019-02-19 ENCOUNTER — ANTICOAGULATION THERAPY VISIT (OUTPATIENT)
Dept: NURSING | Facility: CLINIC | Age: 74
End: 2019-02-19
Payer: COMMERCIAL

## 2019-02-19 LAB — INR POINT OF CARE: 2.6 (ref 0.86–1.14)

## 2019-02-19 PROCEDURE — 36416 COLLJ CAPILLARY BLOOD SPEC: CPT

## 2019-02-19 PROCEDURE — 85610 PROTHROMBIN TIME: CPT | Mod: QW

## 2019-02-19 NOTE — PROGRESS NOTES
ANTICOAGULATION FOLLOW-UP CLINIC VISIT    Patient Name:  Jenn Joseph  Date:  2019  Contact Type:  Face to Face    SUBJECTIVE:     Patient Findings     Positives:   No Problem Findings           OBJECTIVE    INR Protime   Date Value Ref Range Status   2019 2.6 (A) 0.86 - 1.14 Final       ASSESSMENT / PLAN  INR assessment THER    Recheck INR In: 6 WEEKS    INR Location Clinic      Anticoagulation Summary  As of 2019    INR goal:   2.0-3.0   TTR:   94.1 % (2.8 y)   INR used for dosin.6 (2019)   Warfarin maintenance plan:   2 mg (1 mg x 2) every Mon, Wed, Fri; 1 mg (1 mg x 1) all other days   Full warfarin instructions:   2 mg every Mon, Wed, Fri; 1 mg all other days   Weekly warfarin total:   10 mg   No change documented:   Argelia Xie, RN   Plan last modified:   Manjula Hamm RN (2018)   Next INR check:   2019   Target end date:       Indications    Long-term (current) use of anticoagulants [Z79.01] (Resolved) [Z79.01]  Atrial fibrillation/flutter (Resolved)             Anticoagulation Episode Summary     INR check location:       Preferred lab:       Send INR reminders to:   [facility]    Comments:   every 6 weeks      Anticoagulation Care Providers     Provider Role Specialty Phone number    Taylor Mehta PA-C Responsible Physician Assistant [phone]            See the Encounter Report to view Anticoagulation Flowsheet and Dosing Calendar (Go to Encounters tab in chart review, and find the Anticoagulation Therapy Visit)    Continue maintenance warfarin dosing. Denies any changes to medications or other diet issues. Denies any missed doses or extra doses. Denies bleeding, bruising, or blood clots. Verbalizes understanding of dosing and recheck date. Patient is aware if signs of clotting (pain, tenderness, swelling, color change in leg or arm, SOB) and bleeding occur (blood in stool, urine, large bruising, bleeding gums, nosebleeds) to have  INR check sooner. If sx severe report to ER or concerned for stroke call 911. If general questions or concerns arise, call clinic.         Argelia Xie RN

## 2019-03-01 ENCOUNTER — OFFICE VISIT (OUTPATIENT)
Dept: FAMILY MEDICINE | Facility: CLINIC | Age: 74
End: 2019-03-01
Payer: COMMERCIAL

## 2019-03-01 VITALS
SYSTOLIC BLOOD PRESSURE: 130 MMHG | RESPIRATION RATE: 18 BRPM | HEART RATE: 63 BPM | WEIGHT: 137.4 LBS | BODY MASS INDEX: 25.28 KG/M2 | OXYGEN SATURATION: 96 % | DIASTOLIC BLOOD PRESSURE: 66 MMHG | TEMPERATURE: 97.9 F | HEIGHT: 62 IN

## 2019-03-01 DIAGNOSIS — N30.00 ACUTE CYSTITIS WITHOUT HEMATURIA: Primary | ICD-10-CM

## 2019-03-01 DIAGNOSIS — R82.90 NONSPECIFIC FINDING ON EXAMINATION OF URINE: ICD-10-CM

## 2019-03-01 DIAGNOSIS — I48.20 CHRONIC ATRIAL FIBRILLATION (H): ICD-10-CM

## 2019-03-01 LAB
ALBUMIN UR-MCNC: NEGATIVE MG/DL
APPEARANCE UR: CLEAR
BILIRUB UR QL STRIP: NEGATIVE
COLOR UR AUTO: YELLOW
GLUCOSE UR STRIP-MCNC: NEGATIVE MG/DL
HGB UR QL STRIP: ABNORMAL
KETONES UR STRIP-MCNC: NEGATIVE MG/DL
LEUKOCYTE ESTERASE UR QL STRIP: ABNORMAL
NITRATE UR QL: NEGATIVE
PH UR STRIP: 7 PH (ref 5–7)
RBC #/AREA URNS AUTO: ABNORMAL /HPF
SOURCE: ABNORMAL
SP GR UR STRIP: 1.01 (ref 1–1.03)
UROBILINOGEN UR STRIP-ACNC: 0.2 EU/DL (ref 0.2–1)
WBC #/AREA URNS AUTO: ABNORMAL /HPF

## 2019-03-01 PROCEDURE — 99213 OFFICE O/P EST LOW 20 MIN: CPT | Performed by: PHYSICIAN ASSISTANT

## 2019-03-01 PROCEDURE — 81001 URINALYSIS AUTO W/SCOPE: CPT | Performed by: PHYSICIAN ASSISTANT

## 2019-03-01 PROCEDURE — 87086 URINE CULTURE/COLONY COUNT: CPT | Performed by: PHYSICIAN ASSISTANT

## 2019-03-01 RX ORDER — NITROFURANTOIN 25; 75 MG/1; MG/1
100 CAPSULE ORAL 2 TIMES DAILY
Qty: 14 CAPSULE | Refills: 0 | Status: SHIPPED | OUTPATIENT
Start: 2019-03-01 | End: 2019-03-08

## 2019-03-01 ASSESSMENT — MIFFLIN-ST. JEOR: SCORE: 1080.46

## 2019-03-01 ASSESSMENT — PAIN SCALES - GENERAL: PAINLEVEL: MODERATE PAIN (4)

## 2019-03-01 NOTE — PROGRESS NOTES
SUBJECTIVE:   Jenn Joseph is a 74 year old female who presents to clinic today for the following health issues:    URINARY TRACT SYMPTOMS  Onset: 2 weeks     Description:   Painful urination (Dysuria): YES  Blood in urine (Hematuria): no   Delay in urine (Hesitency): YES    Intensity: moderate    Progression of Symptoms:  worsening    Accompanying Signs & Symptoms:  Fever/chills: no   Flank pain no   Nausea and vomiting: no   Any vaginal symptoms: none  Abdominal/Pelvic Pain: no     History:   History of frequent UTI's: no   History of kidney stones: no   Sexually Active: no   Possibility of pregnancy: No    Precipitating factors:   Cloudy urine and it hurts to sit down or bend down     Therapies Tried and outcome: None        Problem list and histories reviewed & adjusted, as indicated.  Additional history: as documented    Patient Active Problem List   Diagnosis     GERD (gastroesophageal reflux disease)     CARDIOVASCULAR SCREENING; LDL GOAL LESS THAN 130     Hypertension goal BP (blood pressure) < 140/90     Long-term (current) use of anticoagulants, INR goal 2.0-3.0     Advanced directives, counseling/discussion     CKD (chronic kidney disease) stage 3, GFR 30-59 ml/min (H)     Health Care Home     Seborrhea     Eyelid dermatitis, eczematous     Uterine prolapse     Paroxysmal atrial fibrillation (H)     Pure hypercholesterolemia     Past Surgical History:   Procedure Laterality Date     D & C  80'S     HYSTERECTOMY  11-5-15     ROTATOR CUFF REPAIR RT/LT  2007    LEFT     TUBAL LIGATION  80'S       Social History     Tobacco Use     Smoking status: Former Smoker     Smokeless tobacco: Never Used   Substance Use Topics     Alcohol use: Yes     Comment: 1 - 2 PER WEEK     Family History   Problem Relation Age of Onset     Hypertension Mother      Cerebrovascular Disease Mother      Thyroid Disease Mother      Cerebrovascular Disease Maternal Grandfather      Hypertension Brother      Musculoskeletal  Disorder Brother         FIBROMYALGIA     Heart Disease Brother      Cardiovascular Brother         ATRIAL FIB     Musculoskeletal Disorder Sister         FIBROMYALGIA     Thyroid Disease Sister      Allergies Son      Heart Disease Brother      Cardiovascular Brother      Heart Disease Brother      Cardiovascular Brother      Heart Disease Brother      Cardiovascular Brother      Heart Disease Father      Cardiovascular Brother         ATRIAL FIB     Cardiovascular Brother         ATRIAL FIB     Cardiovascular Brother         ATRIAL FIB     Cardiovascular Brother         ATRIAL FIB         Current Outpatient Medications   Medication Sig Dispense Refill     atenolol (TENORMIN) 25 MG tablet Take 0.5 tablets (12.5 mg) by mouth daily 45 tablet 3     Calcium Carbonate Antacid (TUMS PO) Take  by mouth. prn       Calcium-Magnesium-Zinc 333-133-5 MG TABS Take by mouth 2 times daily        Cholecalciferol (VITAMIN D) 1000 UNITS capsule Take 1 capsule by mouth daily.       Coenzyme Q10 (CO Q-10) 200 MG CAPS Take 200 mg by mouth daily 90 capsule 3     desonide (DESOWEN) 0.05 % ointment Apply small amount twice daily to the eyelids for up to two weeks at a time. Take caution to not get it in the eyes. (Patient taking differently: as needed Apply small amount twice daily to the eyelids for up to two weeks at a time. Take caution to not get it in the eyes.) 15 g 3     lisinopril (PRINIVIL/ZESTRIL) 5 MG tablet Take 1 tablet (5 mg) by mouth daily 90 tablet 3     Multiple Vitamins-Minerals (MULTIVITAL PO) Once a day       nitroFURantoin macrocrystal-monohydrate (MACROBID) 100 MG capsule Take 1 capsule (100 mg) by mouth 2 times daily for 7 days 14 capsule 0     order for DME Equipment being ordered: SP boot walker. 1 each 0     pravastatin (PRAVACHOL) 20 MG tablet Take 1 tablet (20 mg) by mouth daily 90 tablet 3     probiotic CAPS 2 times weekly       propafenone (RYTHMOL SR) 425 MG 12 hr capsule Take 1 capsule (425 mg) by mouth 2  "times daily 180 capsule 3     ranitidine (ZANTAC) 150 MG tablet Take 1 tablet (150 mg) by mouth 2 times daily as needed for heartburn 180 tablet 3     warfarin (COUMADIN) 1 MG tablet TAKE 2 MG (2 TABS) MON, WED, FRI AND 1 MG (1 TAB) ALL OTHER DAYS OR PER DIRECTION OF INR NURSE 140 tablet 3     Allergies   Allergen Reactions     Diflucan [Fluconazole] Rash     NOT EXACTLY SURE     Lamisil Rash       Reviewed and updated as needed this visit by clinical staff  Tobacco  Allergies  Meds       Reviewed and updated as needed this visit by Provider  Tobacco         ROS:  Constitutional, HEENT, cardiovascular, pulmonary, gi and gu systems are negative, except as otherwise noted.    OBJECTIVE:     /66   Pulse 63   Temp 97.9  F (36.6  C) (Oral)   Resp 18   Ht 1.581 m (5' 2.25\")   Wt 62.3 kg (137 lb 6.4 oz)   SpO2 96%   BMI 24.93 kg/m    Body mass index is 24.93 kg/m .  GENERAL: healthy, alert and no distress  NECK: no adenopathy, no asymmetry, masses, or scars and thyroid normal to palpation  RESP: lungs clear to auscultation - no rales, rhonchi or wheezes  CV: regular rate and rhythm, normal S1 S2, no S3 or S4, no murmur, click or rub, no peripheral edema and peripheral pulses strong  ABDOMEN: soft, nontender, no hepatosplenomegaly, no masses and bowel sounds normal  MS: no gross musculoskeletal defects noted, no edema  BACK: no CVA tenderness, no paralumbar tenderness    Diagnostic Test Results:  Results for orders placed or performed in visit on 03/01/19 (from the past 24 hour(s))   *UA reflex to Microscopic and Culture ([place] [facility] (except [place] and [place])   Result Value Ref Range    Color Urine Yellow     Appearance Urine Clear     Glucose Urine Negative NEG^Negative mg/dL    Bilirubin Urine Negative NEG^Negative    Ketones Urine Negative NEG^Negative mg/dL    Specific Gravity Urine 1.010 1.003 - 1.035    Blood Urine Trace (A) NEG^Negative    pH Urine 7.0 5.0 - 7.0 pH    Protein " Albumin Urine Negative NEG^Negative mg/dL    Urobilinogen Urine 0.2 0.2 - 1.0 EU/dL    Nitrite Urine Negative NEG^Negative    Leukocyte Esterase Urine Large (A) NEG^Negative    Source Midstream Urine    Urine Microscopic   Result Value Ref Range    WBC Urine  (A) OTO5^0 - 5 /HPF    RBC Urine O - 2 OTO2^O - 2 /HPF       ASSESSMENT/PLAN:       ICD-10-CM    1. Acute cystitis without hematuria N30.00 *UA reflex to Microscopic and Culture ([place] [facility] (except [place] and [place])     Urine Microscopic     nitroFURantoin macrocrystal-monohydrate (MACROBID) 100 MG capsule   2. Nonspecific finding on examination of urine R82.90 Urine Culture Aerobic Bacterial   3. Chronic atrial fibrillation (H) I48.2 INR CLINIC REFERRAL     Please take Nitrofurantoin 100 mg  twice a day with large amount of water  Increase fluids  Follow up or call in 3 days if not better  Urine culture is pending  For prevention wipe front to back, and wash with soap and water  3. Stable INR, no recent episodes.    Taylor Mehta PA-C  [facility]

## 2019-03-01 NOTE — PATIENT INSTRUCTIONS
"Please take Nitrofurantoin 100 mg  twice a day with large amount of water  Increase fluids  Follow up or call in 3 days if not better  Urine culture is pending  For prevention wipe front to back, and wash with soap and water    Patient Education     Bladder Infection, Female (Adult)    Urine is normally doesn't have any bacteria in it. But bacteria can get into the urinary tract from the skin around the rectum. Or they can travel in the blood from elsewhere in the body. Once they are in your urinary tract, they can cause infection in the urethra (urethritis), the bladder (cystitis), or the kidneys (pyelonephritis).  The most common place for an infection is in the bladder. This is called a bladder infection. This is one of the most common infections in women. Most bladder infections are easily treated. They are not serious unless the infection spreads to the kidney.  The phrases \"bladder infection,\" \"UTI,\" and \"cystitis\" are often used to describe the same thing. But they are not always the same. Cystitis is an inflammation of the bladder. The most common cause of cystitis is an infection.  Symptoms  The infection causes inflammation in the urethra and bladder. This causes many of the symptoms. The most common symptoms of a bladder infection are:    Pain or burning when urinating    Having to urinate more often than usual    Urgent need to urinate    Only a small amount of urine comes out    Blood in urine    Abdominal discomfort. This is usually in the lower abdomen above the pubic bone.    Cloudy urine    Strong- or bad-smelling urine    Unable to urinate (urinary retention)    Unable to hold urine in (urinary incontinence)    Fever    Loss of appetite    Confusion (in older adults)  Causes  Bladder infections are not contagious. You can't get one from someone else, from a toilet seat, or from sharing a bath.  The most common cause of bladder infections is bacteria from the bowels. The bacteria get onto the skin " around the opening of the urethra. From there, they can get into the urine and travel up to the bladder, causing inflammation and infection. This usually happens because of:    Wiping improperly after urinating. Always wipe from front to back.    Bowel incontinence    Pregnancy    Procedures such as having a catheter inserted    Older age    Not emptying your bladder. This can allow bacteria a chance to grow in your urine.    Dehydration    Constipation    Sex    Use of a diaphragm for birth control   Treatment  Bladder infections are diagnosed by a urine test. They are treated with antibiotics and usually clear up quickly without complications. Treatment helps prevent a more serious kidney infection.  Medicines  Medicines can help in the treatment of a bladder infection:    Take antibiotics until they are used up, even if you feel better. It is important to finish them to make sure the infection has cleared.    You can use acetaminophen or ibuprofen for pain, fever, or discomfort, unless another medicine was prescribed. If you have chronic liver or kidney disease, talk with your healthcare provider before using these medicines. Also talk with your provider if you've ever had a stomach ulcer or gastrointestinal bleeding, or are taking blood-thinner medicines.    If you are given phenazopydridine to reduce burning with urination, it will cause your urine to become a bright orange color. This can stain clothing.  Care and prevention  These self-care steps can help prevent future infections:    Drink plenty of fluids to prevent dehydration and flush out your bladder. Do this unless you must restrict fluids for other health reasons, or your doctor told you not to.    Proper cleaning after going to the bathroom is important. Wipe from front to back after using the toilet to prevent the spread of bacteria.    Urinate more often. Don't try to hold urine in for a long time.    Wear loose-fitting clothes and cotton  underwear. Avoid tight-fitting pants.    Improve your diet and prevent constipation. Eat more fresh fruit and vegetables, and fiber, and less junk and fatty foods.    Avoid sex until your symptoms are gone.    Avoid caffeine, alcohol, and spicy foods. These can irritate your bladder.    Urinate right after intercourse to flush out your bladder.    If you use birth control pills and have frequent bladder infections, discuss it with your doctor.  Follow-up care  Call your healthcare provider if all symptoms are not gone after 3 days of treatment. This is especially important if you have repeat infections.  If a culture was done, you will be told if your treatment needs to be changed. If directed, you can call to find out the results.  If X-rays were done, you will be told if the results will affect your treatment.  Call 911  Call 911 if any of the following occur:    Trouble breathing    Hard to wake up or confusion    Fainting or loss of consciousness    Rapid heart rate  When to seek medical advice  Call your healthcare provider right away if any of these occur:    Fever of 100.4 F (38.0 C) or higher, or as directed by your healthcare provider    Symptoms are not better by the third day of treatment    Back or belly (abdominal) pain that gets worse    Repeated vomiting, or unable to keep medicine down    Weakness or dizziness    Vaginal discharge    Pain, redness, or swelling in the outer vaginal area (labia)  Date Last Reviewed: 10/1/2016    0735-2383 [employer]. [address] [place] [address], [place] [address]. All rights reserved. This information is not intended as a substitute for professional medical care. Always follow your healthcare professional's instructions.

## 2019-03-02 LAB
BACTERIA SPEC CULT: NORMAL
SPECIMEN SOURCE: NORMAL

## 2019-03-08 ENCOUNTER — TRANSFERRED RECORDS (OUTPATIENT)
Dept: HEALTH INFORMATION MANAGEMENT | Facility: CLINIC | Age: 74
End: 2019-03-08

## 2019-03-08 LAB — EJECTION FRACTION: 58

## 2019-03-13 ENCOUNTER — OFFICE VISIT (OUTPATIENT)
Dept: FAMILY MEDICINE | Facility: CLINIC | Age: 74
End: 2019-03-13
Payer: COMMERCIAL

## 2019-03-13 VITALS
BODY MASS INDEX: 25.03 KG/M2 | DIASTOLIC BLOOD PRESSURE: 72 MMHG | HEIGHT: 62 IN | RESPIRATION RATE: 16 BRPM | HEART RATE: 63 BPM | SYSTOLIC BLOOD PRESSURE: 146 MMHG | TEMPERATURE: 98 F | OXYGEN SATURATION: 95 % | WEIGHT: 136 LBS

## 2019-03-13 DIAGNOSIS — H81.11 BENIGN PAROXYSMAL POSITIONAL VERTIGO, RIGHT: Primary | ICD-10-CM

## 2019-03-13 DIAGNOSIS — H70.002 ACUTE MASTOIDITIS OF LEFT SIDE: ICD-10-CM

## 2019-03-13 PROCEDURE — 99214 OFFICE O/P EST MOD 30 MIN: CPT | Performed by: INTERNAL MEDICINE

## 2019-03-13 RX ORDER — MECLIZINE HYDROCHLORIDE 25 MG/1
25 TABLET ORAL 3 TIMES DAILY PRN
Qty: 30 TABLET | Refills: 11 | Status: SHIPPED | OUTPATIENT
Start: 2019-03-13 | End: 2022-03-09

## 2019-03-13 ASSESSMENT — PAIN SCALES - GENERAL: PAINLEVEL: NO PAIN (0)

## 2019-03-13 ASSESSMENT — MIFFLIN-ST. JEOR: SCORE: 1074.11

## 2019-03-13 NOTE — PROGRESS NOTES
SUBJECTIVE:   Jenn Joseph is a 74 year old female who presents to clinic today for the following health issues:    Dizziness  Onset: past couple of days    Description:   Do you feel faint:  YES  Does it feel like the surroundings (bed, room) are moving: YES, spinning sensation  Unsteady/off balance: YES  Have you passed out or fallen: no     Intensity: moderate    Progression of Symptoms:  worsening    Accompanying Signs & Symptoms:  Heart palpitations: no   Nausea, vomiting: YES  Weakness in arms or legs: no   Fatigue: no   Vision or speech changes: no   Ringing in ears (Tinnitus): YES  Hearing Loss: no     History:   Head trauma/concussion hx: no   Previous similar symptoms: YES- less than once a month  Recent bleeding history: no     Precipitating factors:   Worse with activity or head movement: YES  Any new medications (BP?): no   Alcohol/drug abuse/withdrawal: no     Alleviating factors:   Does staying in a fixed position give relief:  YES  Therapies Tried and outcome: laying still seems to help with dizzy spells. Advised to take BP pills by cardiologist to improve dizzy, but it only made it worse. Dizziness is now happening more frequently, occurring in the middle of the night, and even in the morning.         Problem list and histories reviewed & adjusted, as indicated.  Additional history: as documented    Patient Active Problem List   Diagnosis     GERD (gastroesophageal reflux disease)     CARDIOVASCULAR SCREENING; LDL GOAL LESS THAN 130     Hypertension goal BP (blood pressure) < 140/90     Long-term (current) use of anticoagulants, INR goal 2.0-3.0     Advanced directives, counseling/discussion     CKD (chronic kidney disease) stage 3, GFR 30-59 ml/min (H)     Health Care Home     Seborrhea     Eyelid dermatitis, eczematous     Uterine prolapse     Paroxysmal atrial fibrillation (H)     Pure hypercholesterolemia     Past Surgical History:   Procedure Laterality Date     D & C  80'S      HYSTERECTOMY  11-5-15     ROTATOR CUFF REPAIR RT/LT  2007    LEFT     TUBAL LIGATION  80'S       Social History     Tobacco Use     Smoking status: Former Smoker     Smokeless tobacco: Never Used   Substance Use Topics     Alcohol use: Yes     Comment: 1 - 2 PER WEEK     Family History   Problem Relation Age of Onset     Hypertension Mother      Cerebrovascular Disease Mother      Thyroid Disease Mother      Cerebrovascular Disease Maternal Grandfather      Hypertension Brother      Musculoskeletal Disorder Brother         FIBROMYALGIA     Heart Disease Brother      Cardiovascular Brother         ATRIAL FIB     Musculoskeletal Disorder Sister         FIBROMYALGIA     Thyroid Disease Sister      Allergies Son      Heart Disease Brother      Cardiovascular Brother      Heart Disease Brother      Cardiovascular Brother      Heart Disease Brother      Cardiovascular Brother      Heart Disease Father      Cardiovascular Brother         ATRIAL FIB     Cardiovascular Brother         ATRIAL FIB     Cardiovascular Brother         ATRIAL FIB     Cardiovascular Brother         ATRIAL FIB         Allergies   Allergen Reactions     Diflucan [Fluconazole] Rash     NOT EXACTLY SURE     Lamisil Rash     Recent Labs   Lab Test 09/14/18  0827 10/12/17  0909 09/15/16  0858 09/23/15  0938 10/13/14  0931   * 126* 100* 88 68   HDL 65 67 67 63 70   TRIG 137 142 123 100 134   ALT  --   --  43 37 31   CR 1.10* 1.06* 1.07* 1.12*  --    GFRESTIMATED 49* 51* 50* 48*  --    GFRESTBLACK 59* 62 61 58*  --    POTASSIUM 4.2 5.1 4.2 4.4  --       BP Readings from Last 3 Encounters:   03/13/19 146/72   03/01/19 130/66   09/17/18 130/64    Wt Readings from Last 3 Encounters:   03/13/19 61.7 kg (136 lb)   03/01/19 62.3 kg (137 lb 6.4 oz)   09/17/18 62.6 kg (137 lb 14.4 oz)                 ROS:  CONSTITUTIONAL: NEGATIVE for fever, chills, change in weight  INTEGUMENTARY/SKIN: NEGATIVE for worrisome rashes, moles or lesions  EYES: NEGATIVE for  "vision changes or irritation  ENT/MOUTH: NEGATIVE for ear, mouth and throat problems  RESP: NEGATIVE for significant cough or SOB  CV: NEGATIVE for chest pain, palpitations or peripheral edema  GI: NEGATIVE for nausea, abdominal pain, heartburn, or change in bowel habits  : NEGATIVE for frequency, dysuria, or hematuria  MUSCULOSKELETAL: NEGATIVE for significant arthralgias or myalgia  NEURO: NEGATIVE for weakness, dizziness or paresthesias  ENDOCRINE: NEGATIVE for temperature intolerance, skin/hair changes  HEME: NEGATIVE for bleeding problems  PSYCHIATRIC: NEGATIVE for changes in mood or affect    OBJECTIVE:     /72 (BP Location: Left arm, Patient Position: Chair, Cuff Size: Adult Regular)   Pulse 63   Temp 98  F (36.7  C) (Oral)   Resp 16   Ht 1.581 m (5' 2.25\")   Wt 61.7 kg (136 lb)   SpO2 95%   BMI 24.68 kg/m    Body mass index is 24.68 kg/m .  GENERAL: healthy, alert and no distress  EYES: Eyes grossly normal to inspection, PERRL and conjunctivae and sclerae normal  HENT: ear canals and TM's normal, nose and mouth without ulcers or lesions  NECK: no adenopathy, no asymmetry, masses, or scars and thyroid normal to palpation  RESP: lungs clear to auscultation - no rales, rhonchi or wheezes  CV: regular rate and rhythm, normal S1 S2, no S3 or S4, no murmur, click or rub, no peripheral edema and peripheral pulses strong  ABDOMEN: soft, nontender, no hepatosplenomegaly, no masses and bowel sounds normal  MS: no gross musculoskeletal defects noted, no edema  SKIN: no suspicious lesions or rashes  NEURO: Normal strength and tone, mentation intact and speech normal  PSYCH: mentation appears normal, affect normal/bright    Diagnostic Test Results:  MR BRAIN W/O CONTRAST 3/18/2019 7:50 AM     Provided History: Vertigo; Benign paroxysmal positional vertigo, right  ICD-10: Benign paroxysmal positional vertigo, right     Comparison:  None available      Technique: Sagittal T1-weighted and axial T2-weighted, " turboFLAIR and  diffusion-weighted with ADC map images of the brain were obtained  without intravenous contrast.     Findings: These images reveal no intracranial mass lesion, mass  effect, midline shift or abnormal extraaxial fluid collection. The  ventricles and sulci are normal for age. Generalized parenchymal  atrophy. Multiple scattered FLAIR hyperintensities in the  periventricular deep subcortical white matter, and parts of the  brainstem. These are more than expected for age likely represent  sequela of chronic small vessel ischemic disease. No abnormality of  reduced diffusion.  Normal intravascular flow voids.  Mild mucosal thickening in the ethmoid sinuses and minimal left  mastoid effusion. Rest of the visualized paranasal sinuses appear  clear.                                                                   Impression:   1. Sequela of chronic small vessel ischemic disease in the cerebral  white matter.  2. No definite findings to explain the patient's symptoms on this  noncontrast MRI.  3. Trace left mastoid fluid.     I have personally reviewed the examination and initial interpretation  and I agree with the findings.     MARSHALL PEREZ MD    ASSESSMENT/PLAN:     1. Benign paroxysmal positional vertigo, right    - OTOLARYNGOLOGY REFERRAL; Future  - MR Brain w/o Contrast; Future  - meclizine (ANTIVERT) 25 MG tablet; Take 1 tablet (25 mg) by mouth 3 times daily as needed for dizziness  Dispense: 30 tablet; Refill: 11    2. Acute mastoiditis of left side    - OTOLARYNGOLOGY REFERRAL; Future    Follow-up visit if condition worsens.    Drake Lucas MD  [facility]

## 2019-03-18 ENCOUNTER — ANCILLARY PROCEDURE (OUTPATIENT)
Dept: MRI IMAGING | Facility: CLINIC | Age: 74
End: 2019-03-18
Attending: INTERNAL MEDICINE
Payer: COMMERCIAL

## 2019-03-18 DIAGNOSIS — H81.11 BENIGN PAROXYSMAL POSITIONAL VERTIGO, RIGHT: ICD-10-CM

## 2019-03-18 PROCEDURE — 70551 MRI BRAIN STEM W/O DYE: CPT | Performed by: RADIOLOGY

## 2019-03-26 ENCOUNTER — OFFICE VISIT (OUTPATIENT)
Dept: OTOLARYNGOLOGY | Facility: CLINIC | Age: 74
End: 2019-03-26
Payer: COMMERCIAL

## 2019-03-26 ENCOUNTER — OFFICE VISIT (OUTPATIENT)
Dept: AUDIOLOGY | Facility: CLINIC | Age: 74
End: 2019-03-26
Payer: COMMERCIAL

## 2019-03-26 DIAGNOSIS — R42 DIZZINESS: Primary | ICD-10-CM

## 2019-03-26 DIAGNOSIS — H81.10 BENIGN PAROXYSMAL POSITIONAL VERTIGO, UNSPECIFIED LATERALITY: ICD-10-CM

## 2019-03-26 DIAGNOSIS — H90.3 SENSORINEURAL HEARING LOSS, BILATERAL: Primary | ICD-10-CM

## 2019-03-26 DIAGNOSIS — R42 VERTIGO: ICD-10-CM

## 2019-03-26 PROCEDURE — 99203 OFFICE O/P NEW LOW 30 MIN: CPT | Performed by: OTOLARYNGOLOGY

## 2019-03-26 PROCEDURE — 99207 ZZC NO CHARGE LOS: CPT | Performed by: AUDIOLOGIST

## 2019-03-26 PROCEDURE — 92557 COMPREHENSIVE HEARING TEST: CPT | Performed by: AUDIOLOGIST

## 2019-03-26 PROCEDURE — 92550 TYMPANOMETRY & REFLEX THRESH: CPT | Performed by: AUDIOLOGIST

## 2019-03-26 NOTE — PROGRESS NOTES
History of Present Illness - Jenn Joseph is a 74 year old female here to see me for the first time at the consultation of Dr. Lucas for dizziness and hearing loss.    She tells me that for years she would get very short episodes of dizziness.  They would last only minutes to a few hours, then would resolve.  They would happen when she would lay in bed, and the room would start to spin.  THen two weeks ago it happened but lasted all night.  There was some ear ringing with these episodes, and some nausea.  No headache.  She did note that when she turned to the RIGHT it was worse.  Since this bad episode two weeks ago, she does feel a bit off in the head, but not spinning or dizzy.  No family history of dizziness as far as she knows.    No previous ear disease at all, no ear surgery.  No head trauma, no chemo or radiation therapy.    Past Medical History -   Patient Active Problem List   Diagnosis     GERD (gastroesophageal reflux disease)     CARDIOVASCULAR SCREENING; LDL GOAL LESS THAN 130     Hypertension goal BP (blood pressure) < 140/90     Long-term (current) use of anticoagulants, INR goal 2.0-3.0     Advanced directives, counseling/discussion     CKD (chronic kidney disease) stage 3, GFR 30-59 ml/min (H)     Health Care Home     Seborrhea     Eyelid dermatitis, eczematous     Uterine prolapse     Paroxysmal atrial fibrillation (H)     Pure hypercholesterolemia       Current Medications -   Current Outpatient Medications:      atenolol (TENORMIN) 25 MG tablet, Take 0.5 tablets (12.5 mg) by mouth daily, Disp: 45 tablet, Rfl: 3     Calcium Carbonate Antacid (TUMS PO), Take  by mouth. prn, Disp: , Rfl:      Calcium-Magnesium-Zinc 333-133-5 MG TABS, Take by mouth 2 times daily , Disp: , Rfl:      Cholecalciferol (VITAMIN D) 1000 UNITS capsule, Take 1 capsule by mouth daily., Disp: , Rfl:      Coenzyme Q10 (CO Q-10) 200 MG CAPS, Take 200 mg by mouth daily, Disp: 90 capsule, Rfl: 3     desonide (DESOWEN) 0.05 %  ointment, Apply small amount twice daily to the eyelids for up to two weeks at a time. Take caution to not get it in the eyes. (Patient taking differently: as needed Apply small amount twice daily to the eyelids for up to two weeks at a time. Take caution to not get it in the eyes.), Disp: 15 g, Rfl: 3     lisinopril (PRINIVIL/ZESTRIL) 5 MG tablet, Take 1 tablet (5 mg) by mouth daily, Disp: 90 tablet, Rfl: 3     meclizine (ANTIVERT) 25 MG tablet, Take 1 tablet (25 mg) by mouth 3 times daily as needed for dizziness, Disp: 30 tablet, Rfl: 11     Multiple Vitamins-Minerals (MULTIVITAL PO), Once a day, Disp: , Rfl:      order for DME, Equipment being ordered: SP boot walker., Disp: 1 each, Rfl: 0     pravastatin (PRAVACHOL) 20 MG tablet, Take 1 tablet (20 mg) by mouth daily, Disp: 90 tablet, Rfl: 3     probiotic CAPS, 2 times weekly, Disp: , Rfl:      propafenone (RYTHMOL SR) 425 MG 12 hr capsule, Take 1 capsule (425 mg) by mouth 2 times daily, Disp: 180 capsule, Rfl: 3     ranitidine (ZANTAC) 150 MG tablet, Take 1 tablet (150 mg) by mouth 2 times daily as needed for heartburn, Disp: 180 tablet, Rfl: 3     warfarin (COUMADIN) 1 MG tablet, TAKE 2 MG (2 TABS) MON, WED, FRI AND 1 MG (1 TAB) ALL OTHER DAYS OR PER DIRECTION OF INR NURSE, Disp: 140 tablet, Rfl: 3    Allergies -   Allergies   Allergen Reactions     Diflucan [Fluconazole] Rash     NOT EXACTLY SURE     Lamisil Rash       Social History -   Social History     Socioeconomic History     Marital status:      Spouse name: Not on file     Number of children: 2     Years of education: Not on file     Highest education level: Not on file   Occupational History     Employer: RETIRED   Social Needs     Financial resource strain: Not on file     Food insecurity:     Worry: Not on file     Inability: Not on file     Transportation needs:     Medical: Not on file     Non-medical: Not on file   Tobacco Use     Smoking status: Former Smoker     Smokeless tobacco: Never  Used   Substance and Sexual Activity     Alcohol use: Yes     Comment: 1 - 2 PER WEEK     Drug use: No     Sexual activity: Yes     Partners: Male     Birth control/protection: None   Lifestyle     Physical activity:     Days per week: Not on file     Minutes per session: Not on file     Stress: Not on file   Relationships     Social connections:     Talks on phone: Not on file     Gets together: Not on file     Attends [employer] service: Not on file     Active member of club or organization: Not on file     Attends meetings of clubs or organizations: Not on file     Relationship status: Not on file     Intimate partner violence:     Fear of current or ex partner: Not on file     Emotionally abused: Not on file     Physically abused: Not on file     Forced sexual activity: Not on file   Other Topics Concern     Parent/sibling w/ CABG, MI or angioplasty before 65F 55M? Not Asked      Service No     Blood Transfusions No     Caffeine Concern No     Comment: 1/WK     Occupational Exposure No     Hobby Hazards Not Asked     Sleep Concern No     Comment: OCC PROBLEMS RELATED TO MENOPAUSE     Stress Concern Not Asked     Weight Concern Not Asked     Special Diet Not Asked     Back Care Not Asked     Exercise Yes     Comment: 5 - 6/WK     Bike Helmet No     Comment: NOT REGULARLY     Seat Belt Yes     Self-Exams Yes     Comment: OCC   Social History Narrative     Not on file       Family History -   Family History   Problem Relation Age of Onset     Hypertension Mother      Cerebrovascular Disease Mother      Thyroid Disease Mother      Cerebrovascular Disease Maternal Grandfather      Hypertension Brother      Musculoskeletal Disorder Brother         FIBROMYALGIA     Heart Disease Brother      Cardiovascular Brother         ATRIAL FIB     Musculoskeletal Disorder Sister         FIBROMYALGIA     Thyroid Disease Sister      Allergies Son      Heart Disease Brother      Cardiovascular Brother      Heart Disease  Brother      Cardiovascular Brother      Heart Disease Brother      Cardiovascular Brother      Heart Disease Father      Cardiovascular Brother         ATRIAL FIB     Cardiovascular Brother         ATRIAL FIB     Cardiovascular Brother         ATRIAL FIB     Cardiovascular Brother         ATRIAL FIB       Review of Systems - As per HPI and PMHx, otherwise 10+ system review of the head and neck, and general constitution is negative.    Physical Exam  B/P: Data Unavailable, T: Data Unavailable, P: Data Unavailable, R: Data Unavailable  Vitals: There were no vitals taken for this visit.  BMI= There is no height or weight on file to calculate BMI.    General - The patient is well nourished and well developed, and appears to have good nutritional status.  Alert and oriented to person and place, answers questions and cooperates with examination appropriately.   Head and Face - Normocephalic and atraumatic, with no gross asymmetry noted of the contour of the facial features.  The facial nerve is intact, with strong symmetric movements.  Voice and Breathing - The patient was breathing comfortably without the use of accessory muscles. There was no wheezing, stridor, or stertor.  The patients voice was clear and strong, and had appropriate pitch and quality.  Ears - The tympanic membranes are normal in appearance, bony landmarks are intact.  No retraction, perforation, or masses.  No fluid or purulence was seen in the external canal or the middle ear. No evidence of infection of the middle ear or external canal, cerumen was normal in appearance.  Eyes - Extraocular movements intact, and the pupils were reactive to light.  Sclera were not icteric or injected, conjunctiva were pink and moist.  Mouth - Examination of the oral cavity showed pink, healthy oral mucosa. No lesions or ulcerations noted.  The tongue was mobile and midline, and the dentition were in good condition.    Throat - The walls of the oropharynx were smooth,  pink, moist, symmetric, and had no lesions or ulcerations.  The tonsillar pillars and soft palate were symmetric.  The uvula was midline on elevation.    Neck - Normal midline excursion of the laryngotracheal complex during swallowing.  Full range of motion on passive movement.  Palpation of the occipital, submental, submandibular, internal jugular chain, and supraclavicular nodes did not demonstrate any abnormal lymph nodes or masses.  The carotid pulse was palpable bilaterally.  Palpation of the thyroid was soft and smooth, with no nodules or goiter appreciated.  The trachea was mobile and midline.  Nose - External contour is symmetric, no gross deflection or scars.  Nasal mucosa is pink and moist with no abnormal mucus.  The septum was midline and non-obstructive, turbinates of normal size and position.  No polyps, masses, or purulence noted on examination.  ABNORMAL Balance evaluation - The patient was able to stand independently in the room, and had slight swaying with heels together and eyes closed.  On standing heel to toe, however, the patient immediately stumbled to the RIGHT with eyes closed.      Audiologic Studies - An audiogram and tympanogram were performed today as part of the evaluation and personally reviewed. The tympanogram shows a normal Type A curve, with normal canal volume and middle ear pressure.  There is no sign of eustachian tube dysfunction or middle ear effusion.  The audiogram was also normal.  The sensorineural hearing was age-appropriate, with no evidence of conductive hearing loss or significant asymmetry.      A/P - Jenn Joseph is a 74 year old female  (R42) Dizziness  (primary encounter diagnosis)  (R42) Vertigo    It does sound like she has a true peripheral vsetibular issue here, possibly Meniere's, or an unusual presentation of benign paroxysmal positional vertigo.    I think it would be worthwhile to send her to formal benign paroxysmal positional vertigo therapy. Based  on today's exam and history, I think that the most likely diagnosis at this point is benign paroxysmal positional vertigo.  The etiology of benign paroxysmal positional vertigo being small crystals tumbling in the semicircular canals was discussed at length.  Also, the treatment of the problem with physical therapy for canalith repositioning maneuvers was discussed.    I will send the patient to physical therapy for this to be done.  The patient was very specifically instructed to return to me should the therapy prove unsuccessful.  In which case we will search for other possible causes, as well as discuss possible further imaging such as MRI.    However, if this does not work, I would like to see her back and empirically approach this as Meniere's or labyrinthine hydrops with CATS diet and possibly dyazide.

## 2019-03-26 NOTE — LETTER
3/26/2019         RE: Jenn Joseph  [id number] [facility] [address]        Dear Colleague,    Thank you for referring your patient, Jenn Joseph, to the [facility]. Please see a copy of my visit note below.    History of Present Illness - Jenn Joseph is a 74 year old female here to see me for the first time at the consultation of Dr. Lucas for dizziness and hearing loss.    She tells me that for years she would get very short episodes of dizziness.  They would last only minutes to a few hours, then would resolve.  They would happen when she would lay in bed, and the room would start to spin.  THen two weeks ago it happened but lasted all night.  There was some ear ringing with these episodes, and some nausea.  No headache.  She did note that when she turned to the RIGHT it was worse.  Since this bad episode two weeks ago, she does feel a bit off in the head, but not spinning or dizzy.  No family history of dizziness as far as she knows.    No previous ear disease at all, no ear surgery.  No head trauma, no chemo or radiation therapy.    Past Medical History -   Patient Active Problem List   Diagnosis     GERD (gastroesophageal reflux disease)     CARDIOVASCULAR SCREENING; LDL GOAL LESS THAN 130     Hypertension goal BP (blood pressure) < 140/90     Long-term (current) use of anticoagulants, INR goal 2.0-3.0     Advanced directives, counseling/discussion     CKD (chronic kidney disease) stage 3, GFR 30-59 ml/min (H)     Health Care Home     Seborrhea     Eyelid dermatitis, eczematous     Uterine prolapse     Paroxysmal atrial fibrillation (H)     Pure hypercholesterolemia       Current Medications -   Current Outpatient Medications:      atenolol (TENORMIN) 25 MG tablet, Take 0.5 tablets (12.5 mg) by mouth daily, Disp: 45 tablet, Rfl: 3     Calcium Carbonate Antacid (TUMS PO), Take  by mouth. prn, Disp: , Rfl:      Calcium-Magnesium-Zinc 333-133-5 MG TABS,  Take by mouth 2 times daily , Disp: , Rfl:      Cholecalciferol (VITAMIN D) 1000 UNITS capsule, Take 1 capsule by mouth daily., Disp: , Rfl:      Coenzyme Q10 (CO Q-10) 200 MG CAPS, Take 200 mg by mouth daily, Disp: 90 capsule, Rfl: 3     desonide (DESOWEN) 0.05 % ointment, Apply small amount twice daily to the eyelids for up to two weeks at a time. Take caution to not get it in the eyes. (Patient taking differently: as needed Apply small amount twice daily to the eyelids for up to two weeks at a time. Take caution to not get it in the eyes.), Disp: 15 g, Rfl: 3     lisinopril (PRINIVIL/ZESTRIL) 5 MG tablet, Take 1 tablet (5 mg) by mouth daily, Disp: 90 tablet, Rfl: 3     meclizine (ANTIVERT) 25 MG tablet, Take 1 tablet (25 mg) by mouth 3 times daily as needed for dizziness, Disp: 30 tablet, Rfl: 11     Multiple Vitamins-Minerals (MULTIVITAL PO), Once a day, Disp: , Rfl:      order for DME, Equipment being ordered: SP boot walker., Disp: 1 each, Rfl: 0     pravastatin (PRAVACHOL) 20 MG tablet, Take 1 tablet (20 mg) by mouth daily, Disp: 90 tablet, Rfl: 3     probiotic CAPS, 2 times weekly, Disp: , Rfl:      propafenone (RYTHMOL SR) 425 MG 12 hr capsule, Take 1 capsule (425 mg) by mouth 2 times daily, Disp: 180 capsule, Rfl: 3     ranitidine (ZANTAC) 150 MG tablet, Take 1 tablet (150 mg) by mouth 2 times daily as needed for heartburn, Disp: 180 tablet, Rfl: 3     warfarin (COUMADIN) 1 MG tablet, TAKE 2 MG (2 TABS) MON, WED, FRI AND 1 MG (1 TAB) ALL OTHER DAYS OR PER DIRECTION OF INR NURSE, Disp: 140 tablet, Rfl: 3    Allergies -   Allergies   Allergen Reactions     Diflucan [Fluconazole] Rash     NOT EXACTLY SURE     Lamisil Rash       Social History -   Social History     Socioeconomic History     Marital status:      Spouse name: Not on file     Number of children: 2     Years of education: Not on file     Highest education level: Not on file   Occupational History     Employer: RETIRED   Social Needs      Financial resource strain: Not on file     Food insecurity:     Worry: Not on file     Inability: Not on file     Transportation needs:     Medical: Not on file     Non-medical: Not on file   Tobacco Use     Smoking status: Former Smoker     Smokeless tobacco: Never Used   Substance and Sexual Activity     Alcohol use: Yes     Comment: 1 - 2 PER WEEK     Drug use: No     Sexual activity: Yes     Partners: Male     Birth control/protection: None   Lifestyle     Physical activity:     Days per week: Not on file     Minutes per session: Not on file     Stress: Not on file   Relationships     Social connections:     Talks on phone: Not on file     Gets together: Not on file     Attends Adventism service: Not on file     Active member of club or organization: Not on file     Attends meetings of clubs or organizations: Not on file     Relationship status: Not on file     Intimate partner violence:     Fear of current or ex partner: Not on file     Emotionally abused: Not on file     Physically abused: Not on file     Forced sexual activity: Not on file   Other Topics Concern     Parent/sibling w/ CABG, MI or angioplasty before 65F 55M? Not Asked      Service No     Blood Transfusions No     Caffeine Concern No     Comment: 1/WK     Occupational Exposure No     Hobby Hazards Not Asked     Sleep Concern No     Comment: OCC PROBLEMS RELATED TO MENOPAUSE     Stress Concern Not Asked     Weight Concern Not Asked     Special Diet Not Asked     Back Care Not Asked     Exercise Yes     Comment: 5 - 6/WK     Bike Helmet No     Comment: NOT REGULARLY     Seat Belt Yes     Self-Exams Yes     Comment: OCC   Social History Narrative     Not on file       Family History -   Family History   Problem Relation Age of Onset     Hypertension Mother      Cerebrovascular Disease Mother      Thyroid Disease Mother      Cerebrovascular Disease Maternal Grandfather      Hypertension Brother      Musculoskeletal Disorder Brother          FIBROMYALGIA     Heart Disease Brother      Cardiovascular Brother         ATRIAL FIB     Musculoskeletal Disorder Sister         FIBROMYALGIA     Thyroid Disease Sister      Allergies Son      Heart Disease Brother      Cardiovascular Brother      Heart Disease Brother      Cardiovascular Brother      Heart Disease Brother      Cardiovascular Brother      Heart Disease Father      Cardiovascular Brother         ATRIAL FIB     Cardiovascular Brother         ATRIAL FIB     Cardiovascular Brother         ATRIAL FIB     Cardiovascular Brother         ATRIAL FIB       Review of Systems - As per HPI and PMHx, otherwise 10+ system review of the head and neck, and general constitution is negative.    Physical Exam  B/P: Data Unavailable, T: Data Unavailable, P: Data Unavailable, R: Data Unavailable  Vitals: There were no vitals taken for this visit.  BMI= There is no height or weight on file to calculate BMI.    General - The patient is well nourished and well developed, and appears to have good nutritional status.  Alert and oriented to person and place, answers questions and cooperates with examination appropriately.   Head and Face - Normocephalic and atraumatic, with no gross asymmetry noted of the contour of the facial features.  The facial nerve is intact, with strong symmetric movements.  Voice and Breathing - The patient was breathing comfortably without the use of accessory muscles. There was no wheezing, stridor, or stertor.  The patients voice was clear and strong, and had appropriate pitch and quality.  Ears - The tympanic membranes are normal in appearance, bony landmarks are intact.  No retraction, perforation, or masses.  No fluid or purulence was seen in the external canal or the middle ear. No evidence of infection of the middle ear or external canal, cerumen was normal in appearance.  Eyes - Extraocular movements intact, and the pupils were reactive to light.  Sclera were not icteric or injected,  conjunctiva were pink and moist.  Mouth - Examination of the oral cavity showed pink, healthy oral mucosa. No lesions or ulcerations noted.  The tongue was mobile and midline, and the dentition were in good condition.    Throat - The walls of the oropharynx were smooth, pink, moist, symmetric, and had no lesions or ulcerations.  The tonsillar pillars and soft palate were symmetric.  The uvula was midline on elevation.    Neck - Normal midline excursion of the laryngotracheal complex during swallowing.  Full range of motion on passive movement.  Palpation of the occipital, submental, submandibular, internal jugular chain, and supraclavicular nodes did not demonstrate any abnormal lymph nodes or masses.  The carotid pulse was palpable bilaterally.  Palpation of the thyroid was soft and smooth, with no nodules or goiter appreciated.  The trachea was mobile and midline.  Nose - External contour is symmetric, no gross deflection or scars.  Nasal mucosa is pink and moist with no abnormal mucus.  The septum was midline and non-obstructive, turbinates of normal size and position.  No polyps, masses, or purulence noted on examination.  ABNORMAL Balance evaluation - The patient was able to stand independently in the room, and had slight swaying with heels together and eyes closed.  On standing heel to toe, however, the patient immediately stumbled to the RIGHT with eyes closed.      Audiologic Studies - An audiogram and tympanogram were performed today as part of the evaluation and personally reviewed. The tympanogram shows a normal Type A curve, with normal canal volume and middle ear pressure.  There is no sign of eustachian tube dysfunction or middle ear effusion.  The audiogram was also normal.  The sensorineural hearing was age-appropriate, with no evidence of conductive hearing loss or significant asymmetry.      A/P - Jenn Joseph is a 74 year old female  (R42) Dizziness  (primary encounter diagnosis)  (R42)  Vertigo    It does sound like she has a true peripheral vsetibular issue here, possibly Meniere's, or an unusual presentation of benign paroxysmal positional vertigo.    I think it would be worthwhile to send her to formal benign paroxysmal positional vertigo therapy. Based on today's exam and history, I think that the most likely diagnosis at this point is benign paroxysmal positional vertigo.  The etiology of benign paroxysmal positional vertigo being small crystals tumbling in the semicircular canals was discussed at length.  Also, the treatment of the problem with physical therapy for canalith repositioning maneuvers was discussed.    I will send the patient to physical therapy for this to be done.  The patient was very specifically instructed to return to me should the therapy prove unsuccessful.  In which case we will search for other possible causes, as well as discuss possible further imaging such as MRI.    However, if this does not work, I would like to see her back and empirically approach this as Meniere's or labyrinthine hydrops with CATS diet and possibly dyazide.        Again, thank you for allowing me to participate in the care of your patient.        Sincerely,        Stevie Mckeon MD

## 2019-03-29 ENCOUNTER — ANTICOAGULATION THERAPY VISIT (OUTPATIENT)
Dept: NURSING | Facility: CLINIC | Age: 74
End: 2019-03-29
Payer: COMMERCIAL

## 2019-03-29 LAB — INR POINT OF CARE: 2 (ref 0.86–1.14)

## 2019-03-29 PROCEDURE — 99207 ZZC NO CHARGE NURSE ONLY: CPT

## 2019-03-29 PROCEDURE — 36416 COLLJ CAPILLARY BLOOD SPEC: CPT

## 2019-03-29 PROCEDURE — 85610 PROTHROMBIN TIME: CPT | Mod: QW

## 2019-03-29 NOTE — PROGRESS NOTES
ANTICOAGULATION FOLLOW-UP CLINIC VISIT    Patient Name:  Jenn Joseph  Date:  3/29/2019  Contact Type:  Face to Face    SUBJECTIVE:     Patient Findings            OBJECTIVE    INR Protime   Date Value Ref Range Status   2019 2.0 (A) 0.86 - 1.14 Final       ASSESSMENT / PLAN  INR assessment THER    Recheck INR In: 6 WEEKS    INR Location Clinic      Anticoagulation Summary  As of 3/29/2019    INR goal:   2.0-3.0   TTR:   94.3 % (2.9 y)   INR used for dosin.0 (3/29/2019)   Warfarin maintenance plan:   2 mg (1 mg x 2) every Mon, Wed, Fri; 1 mg (1 mg x 1) all other days   Full warfarin instructions:   2 mg every Mon, Wed, Fri; 1 mg all other days   Weekly warfarin total:   10 mg   No change documented:   Artemio Rasheed, RN   Plan last modified:   Manjula Hamm RN (2018)   Next INR check:   2019   Target end date:       Indications    Long-term (current) use of anticoagulants [Z79.01] (Resolved) [Z79.01]  Atrial fibrillation/flutter (Resolved)             Anticoagulation Episode Summary     INR check location:       Preferred lab:       Send INR reminders to:   [facility]    Comments:   every 6 weeks      Anticoagulation Care Providers     Provider Role Specialty Phone number    Janine Taylor Keen PA-C Responsible Physician Assistant [phone]            See the Encounter Report to view Anticoagulation Flowsheet and Dosing Calendar (Go to Encounters tab in chart review, and find the Anticoagulation Therapy Visit)    The patient was assessed for diet, medication, and activity level changes, missed or extra doses, bruising or bleeding, with no problem findings.        Artemio Rasheed RN, BSN

## 2019-04-08 ENCOUNTER — HOSPITAL ENCOUNTER (OUTPATIENT)
Dept: PHYSICAL THERAPY | Facility: CLINIC | Age: 74
Setting detail: THERAPIES SERIES
End: 2019-04-08
Attending: OTOLARYNGOLOGY
Payer: COMMERCIAL

## 2019-04-08 DIAGNOSIS — R42 VERTIGO: ICD-10-CM

## 2019-04-08 DIAGNOSIS — H81.10 BENIGN PAROXYSMAL POSITIONAL VERTIGO, UNSPECIFIED LATERALITY: ICD-10-CM

## 2019-04-08 PROCEDURE — 97110 THERAPEUTIC EXERCISES: CPT | Mod: GP | Performed by: PHYSICAL THERAPIST

## 2019-04-08 PROCEDURE — 97161 PT EVAL LOW COMPLEX 20 MIN: CPT | Mod: GP | Performed by: PHYSICAL THERAPIST

## 2019-04-08 NOTE — PROGRESS NOTES
04/08/19 1100   Signing Clinician's Name / Credentials   Signing clinician's name / credentials Mirtha Ferguson PT   Functional Gait Assessment (KADE Toney., Joshua GIsabell F., et al. (2004))   1. GAIT LEVEL SURFACE 3   2. CHANGE IN GAIT SPEED 3   3. GAIT WITH HORIZONTAL HEAD TURNS 3   4. GAIT WITH VERTICAL HEAD TURNS 2  (weaved)   5. GAIT AND PIVOT TURN 3  (3/5 times normal, 2/5 lost balance to the right and post)   6. STEP OVER OBSTACLE 3   7. GAIT WITH NARROW BASE OF SUPPORT 2   8. GAIT WITH EYES CLOSED 3   9. AMBULATING BACKWARDS 2  (small, cautious steps, but can correct with cues)   10. STEPS 3   Total Functional Gait Assessment Score   TOTAL SCORE: (MAXIMUM SCORE 30) 27   Functional Gait Assessment (FGA): The FGA assesses postural stability during various walking tasks.   Gait assistive device used: none    Patient Score: 27/30  Scores of <22/30 have been correlated with predicting falls in community-dwelling older adults according to Dawna & Bertin 2010.   Scores of <18/30 have been correlated with increased risk for falls in patients with Parkinsons Disease according to Randy Vang Zhou et al 2014.  Minimal Detectable Change for patients with acute/chronic stroke = 4.2 according to Thijac & Ritschel 2009  Minimal Detectable Change for patients with vestibular disorder = 8 according to Dawna & Bertin 2010    Assessment (rationale for performing, application to patient s function & care plan): high level mobility, falls risk testing  Minutes billed as physical performance test: part of evaluation  Helena Hammond DPT, MPT, NCS

## 2019-04-09 NOTE — PROGRESS NOTES
04/08/19 1000   Quick Adds   Quick Adds Vestibular Eval;Certification       Present No   General Information   Start of Care Date 04/08/19   Referring Physician Stevie Mckeon MD   Orders Evaluate and Treat as Indicated   Additional Orders Canalith repositioning   Order Date 03/26/19   Medical Diagnosis Vertigo, Benign paroxysmal positional vertigo, unspecified laterality   Onset of illness/injury or Date of Surgery 03/18/19   Surgical/Medical history reviewed Yes  (GERD, CKD, afib, HTN)   Pertinent history of current problem (include personal factors and/or comorbidities that impact the POC) Pt reports dizziness starting 3 weeks ago. She woke up in the middle of the night multiple times with dizziness. She woke up in the morning and she was still dizzy. She went to the MD and was instructed to do head motions and take Meclazine.  She had been taking Meclazine, but is no longer taking it. She is no longer dizzy. Her symptoms occur every 4-5 months and only lasts a few hours. But this incident lasted a few days. Pt reports staggering to the right, hard to walk a straight line, difficult to  tandem, cutting corners, difficult walking in the dark. no falls.   Pertinent Visual History  wears glasses   Prior level of function comment stretches in the am, 20min mile on the treadmill 5days/week, doesn't always hang onto the treadmill, sometimes staggers to the right on the treadmill   Diagnostic Tests MRI;Audiogram  (3/26/2019)   MRI Results unremarkable  (brain on 3/18/2019)   Audiogram Results Unremarkable   Current Community Support Family/friend caregiver   Patient role/Employment history Retired   Living environment [facility]   Home/Community Accessibility Comments stairs   Patient/Family Goals Statement Her goal for PT is to know how to prevent this and get balance exs.   Fall Risk Screen   Fall screen completed by PT   Have you fallen 2 or more times in the past year? No  "  Have you fallen and had an injury in the past year? No   Is patient a fall risk? No   System Outcome Measures   Outcome Measures BPPV   Dizziness Handicap Inventory (score out of 100) A decrease in score by 17.18 or greater indicates a clinically significant change in symptoms. 6   Pain   Patient currently in pain No   Posture   Posture Forward head position;Protracted shoulders;Kyphosis   Strength   Strength Comments B LE 5/5 except hip flex 4/5, hip ER 4/5, hip IR R 4-/5, L 4/5, DF 4/5   Gait Special Tests 25 Foot Timed Walk   Seconds 6.0   Comments normal TK, symmetrical steps, Amb with normal gait pattern, decreased arm swing   Gait Special Tests Functional Gait Assessment Score out of 30   Score out of 30 27  (she moves fast during appt and gait activities)   Comments Loses balance to the right and posterior 2/5 pivots, amb backwards with small short steps but able to modify step length when asked   Balance Special Tests Sit to Stand Reps in 30 Seconds   Reps in 30 seconds 15   Height 18\"   Comments good control   Cervicogenic Screen   Neck ROM WFL for age with discomfort at end range rot B   Infrared Goggle Exam or Frenzel Lense Exam   Vestibular Suppressant in Last 24 Hours? No   Exam completed with Infrared Goggles   Spontaneous Nystagmus Negative   Gaze Evoked Nystagmus Negative   Head Shake Horizontal Nystagmus Negative   Edilia-Hallpike (right) Negative   Edilia-Hallpike (Left) Negative   [facility] Test (Right) Negative   [facility] Sup[facility] Roll Test (Left) Negative   Planned Therapy Interventions   Planned Therapy Interventions balance training;strengthening   Clinical Impression   Criteria for Skilled Therapeutic Interventions Met yes, treatment indicated   PT Diagnosis impaired balance, L/E weakness and hx of vertigo episodes   Influenced by the following impairments episodes of vertigo, posture, dynamic postural control, right LE weakness   Functional limitations due to impairments affects high level " mobility (household/community mobility). During an episode affects ADLs/IADLS and recreational activity   Clinical Presentation Stable/Uncomplicated   Clinical Presentation Rationale medical history, FGA, positional testing with IR goggles, MMT of legs, and clinical judgement   Clinical Decision Making (Complexity) Low complexity   Therapy Frequency 1 time/week   Predicted Duration of Therapy Intervention (days/wks) every other week for 2 months   Risk & Benefits of therapy have been explained Yes   Patient, Family & other staff in agreement with plan of care Yes   Clinical Impression Comments She has had multiple episodes of vertigo that only last a few hours. The most recetn episode lasted a few days but today all positional testing was negative. She reports balance problem at times and her high level dynamic mobility was impaired so we started her on some exercises. Further balance therapy recommended.   Education Assessment   Preferred Learning Style Listening;Demonstration;Pictures/video   Barriers to Learning No barriers   GOALS   PT Eval Goals 1;2   Goal 1   Goal Identifier FGA   Goal Description Improve FGA score from 27/30 to 29/30 for improved balance.(gait with head motion and walking backwards)   Target Date 06/08/19   Goal 2   Goal Identifier ABC    Goal Description ABC testing of balance confidence and goal to be set at next appointment.   Target Date 04/22/19   Total Evaluation Time   PT Eval, Low Complexity Minutes (12038) 45   Therapy Certification   Certification date from 04/08/19   Certification date to 06/08/19   Medical Diagnosis Vertigo, Benign paroxysmal positional vertigo, unspecified laterality   Certification I certify the need for these services furnished under this plan of treatment and while under my care.  (Physician co-signature of this document indicates review and certification of the therapy plan).   Helena Hammond DPT, MPT, NCS

## 2019-04-09 NOTE — PROGRESS NOTES
[facility]      Outpatient Physical Therapy Evaluation  PLAN OF TREATMENT FOR OUTPATIENT REHABILITATION  (COMPLETE FOR INITIAL CLAIMS ONLY)  Patient's Last Name, First Name, M.I.  YOB: 1945  Jenn Joseph                        Provider's Name  [facility] Medical Record No.  [id number]                               Onset Date:  Order date 3/26/2019   Start of Care Date: 4/8/2019     Type: Physical Therapy Medical Diagnosis: BPPV                        Therapy Diagnosis:  Impaired balance, impaired l/e strength and episodes of vertigo   Visits from SOC:  1   _________________________________________________________________________________  Plan of Treatment:      Frequency/Duration: See for up to 4 visits    Goals: improve gait with head motion, improve ambulation backwards and improve her confidence  _________________________________________________________________________________    I CERTIFY THE NEED FOR THESE SERVICES FURNISHED UNDER        THIS PLAN OF TREATMENT AND WHILE UNDER MY CARE     (Physician co-signature of this document indicates review and certification of the therapy plan).                Certification date from: 4/8/2019  Certification date to: 6/8/2019    Referring Provider: DR Stevie Mckeon

## 2019-04-17 ENCOUNTER — TELEPHONE (OUTPATIENT)
Dept: PHYSICAL THERAPY | Facility: CLINIC | Age: 74
End: 2019-04-17

## 2019-04-17 NOTE — TELEPHONE ENCOUNTER
Pt called today to cancel her appt on 4/22/2019. She was offered to reschedule appt and she choose not to. Helena Hammond DPT, MPT, NCS

## 2019-04-17 NOTE — ADDENDUM NOTE
Encounter addended by: Angy Hammond, PT on: 4/17/2019 5:09 PM   Actions taken: Episode resolved, Flowsheet accepted

## 2019-05-07 ENCOUNTER — ANTICOAGULATION THERAPY VISIT (OUTPATIENT)
Dept: NURSING | Facility: CLINIC | Age: 74
End: 2019-05-07
Payer: COMMERCIAL

## 2019-05-07 LAB — INR POINT OF CARE: 2.4 (ref 0.86–1.14)

## 2019-05-07 PROCEDURE — 36416 COLLJ CAPILLARY BLOOD SPEC: CPT

## 2019-05-07 PROCEDURE — 99207 ZZC NO CHARGE NURSE ONLY: CPT

## 2019-05-07 PROCEDURE — 85610 PROTHROMBIN TIME: CPT | Mod: QW

## 2019-05-07 NOTE — PROGRESS NOTES
ANTICOAGULATION FOLLOW-UP CLINIC VISIT    Patient Name:  Jenn Joseph  Date:  2019  Contact Type:  Face to Face    SUBJECTIVE:     Patient Findings            OBJECTIVE    INR Protime   Date Value Ref Range Status   2019 2.4 (A) 0.86 - 1.14 Final       ASSESSMENT / PLAN  INR assessment THER    Recheck INR In: 6 WEEKS    INR Location Clinic      Anticoagulation Summary  As of 2019    INR goal:   2.0-3.0   TTR:   94.5 % (3 y)   INR used for dosin.4 (2019)   Warfarin maintenance plan:   2 mg (1 mg x 2) every Mon, Wed, Fri; 1 mg (1 mg x 1) all other days   Full warfarin instructions:   2 mg every Mon, Wed, Fri; 1 mg all other days   Weekly warfarin total:   10 mg   No change documented:   Artemio Rasheed RN   Plan last modified:   Manjula Hamm RN (2018)   Next INR check:      Target end date:       Indications    Long-term (current) use of anticoagulants [Z79.01] (Resolved) [Z79.01]  Atrial fibrillation/flutter (Resolved)             Anticoagulation Episode Summary     INR check location:       Preferred lab:       Send INR reminders to:    [facility]    Comments:   every 6 weeks      Anticoagulation Care Providers     Provider Role Specialty Phone number    Taylor Mehta PA-C Responsible Physician Assistant [phone]            See the Encounter Report to view Anticoagulation Flowsheet and Dosing Calendar (Go to Encounters tab in chart review, and find the Anticoagulation Therapy Visit)    The patient was assessed for diet, medication, and activity level changes, missed or extra doses, bruising or bleeding, with no problem findings.        Artemio Rasheed RN, BSN, PHN

## 2019-06-20 ENCOUNTER — ANTICOAGULATION THERAPY VISIT (OUTPATIENT)
Dept: NURSING | Facility: CLINIC | Age: 74
End: 2019-06-20
Payer: COMMERCIAL

## 2019-06-20 LAB — INR POINT OF CARE: 2.2 (ref 0.86–1.14)

## 2019-06-20 PROCEDURE — 99207 ZZC NO CHARGE NURSE ONLY: CPT

## 2019-06-20 PROCEDURE — 36416 COLLJ CAPILLARY BLOOD SPEC: CPT

## 2019-06-20 PROCEDURE — 85610 PROTHROMBIN TIME: CPT | Mod: QW

## 2019-06-20 NOTE — PROGRESS NOTES
ANTICOAGULATION FOLLOW-UP CLINIC VISIT    Patient Name:  Jenn Joseph  Date:  2019  Contact Type:  Face to Face    SUBJECTIVE:  Patient Findings         Clinical Outcomes     Negatives:   Major bleeding event, Thromboembolic event, Anticoagulation-related hospital admission, Anticoagulation-related ED visit, Anticoagulation-related fatality           OBJECTIVE    INR Protime   Date Value Ref Range Status   2019 2.2 (A) 0.86 - 1.14 Final       ASSESSMENT / PLAN  INR assessment THER    Recheck INR In: 6 WEEKS    INR Location Clinic      Anticoagulation Summary  As of 2019    INR goal:   2.0-3.0   TTR:   94.7 % (3.2 y)   INR used for dosin.2 (2019)   Warfarin maintenance plan:   2 mg (1 mg x 2) every Mon, Wed, Fri; 1 mg (1 mg x 1) all other days   Full warfarin instructions:   2 mg every Mon, Wed, Fri; 1 mg all other days   Weekly warfarin total:   10 mg   No change documented:   Artemio Rasheed RN   Plan last modified:   Manjula Hamm RN (2018)   Next INR check:      Target end date:       Indications    Long-term (current) use of anticoagulants [Z79.01] (Resolved) [Z79.01]  Atrial fibrillation/flutter (Resolved)             Anticoagulation Episode Summary     INR check location:       Preferred lab:       Send INR reminders to:   ELIZABETH CROUCH    Comments:   every 6 weeks      Anticoagulation Care Providers     Provider Role Specialty Phone number    Maurilio Mehtakaterina TYLOR Keen Responsible Physician Assistant [phone]            See the Encounter Report to view Anticoagulation Flowsheet and Dosing Calendar (Go to Encounters tab in chart review, and find the Anticoagulation Therapy Visit)    The patient was assessed for diet, medication, and activity level changes, missed or extra doses, bruising or bleeding, with no problem findings.        Artemio Rasheed RN, BSN, PHN

## 2019-08-02 ENCOUNTER — ANTICOAGULATION THERAPY VISIT (OUTPATIENT)
Dept: NURSING | Facility: CLINIC | Age: 74
End: 2019-08-02
Payer: COMMERCIAL

## 2019-08-02 LAB — INR POINT OF CARE: 2.7 (ref 0.86–1.14)

## 2019-08-02 PROCEDURE — 36416 COLLJ CAPILLARY BLOOD SPEC: CPT

## 2019-08-02 PROCEDURE — 99207 ZZC NO CHARGE NURSE ONLY: CPT

## 2019-08-02 PROCEDURE — 85610 PROTHROMBIN TIME: CPT | Mod: QW

## 2019-08-02 NOTE — PROGRESS NOTES
ANTICOAGULATION FOLLOW-UP CLINIC VISIT    Patient Name:  Jenn Joseph  Date:  2019  Contact Type:  Face to Face    SUBJECTIVE:  Patient Findings         Clinical Outcomes     Negatives:   Major bleeding event, Thromboembolic event, Anticoagulation-related hospital admission, Anticoagulation-related ED visit, Anticoagulation-related fatality           OBJECTIVE    INR Protime   Date Value Ref Range Status   2019 2.7 (A) 0.86 - 1.14 Final       ASSESSMENT / PLAN  INR assessment THER    Recheck INR In: 6 WEEKS    INR Location Clinic      Anticoagulation Summary  As of 2019    INR goal:   2.0-3.0   TTR:   94.9 % (3.3 y)   INR used for dosin.7 (2019)   Warfarin maintenance plan:   2 mg (1 mg x 2) every Mon, Wed, Fri; 1 mg (1 mg x 1) all other days   Full warfarin instructions:   2 mg every Mon, Wed, Fri; 1 mg all other days   Weekly warfarin total:   10 mg   No change documented:   Artemio Rasheed RN   Plan last modified:   Manjula Hamm RN (2018)   Next INR check:      Target end date:       Indications    Long-term (current) use of anticoagulants [Z79.01] (Resolved) [Z79.01]  Atrial fibrillation/flutter (Resolved)             Anticoagulation Episode Summary     INR check location:       Preferred lab:       Send INR reminders to:   ELIZABETH CROUCH    Comments:   every 6 weeks      Anticoagulation Care Providers     Provider Role Specialty Phone number    Janine Taylor TYLOR Keen Responsible Physician Assistant [phone]            See the Encounter Report to view Anticoagulation Flowsheet and Dosing Calendar (Go to Encounters tab in chart review, and find the Anticoagulation Therapy Visit)    The patient was assessed for diet, medication, and activity level changes, missed or extra doses, bruising or bleeding, with no problem findings.        Artemio Rasheed RN, BSN, PHN

## 2019-08-14 ENCOUNTER — OFFICE VISIT (OUTPATIENT)
Dept: URGENT CARE | Facility: URGENT CARE | Age: 74
End: 2019-08-14
Payer: COMMERCIAL

## 2019-08-14 VITALS
OXYGEN SATURATION: 95 % | SYSTOLIC BLOOD PRESSURE: 153 MMHG | HEART RATE: 67 BPM | BODY MASS INDEX: 24.2 KG/M2 | TEMPERATURE: 98.4 F | DIASTOLIC BLOOD PRESSURE: 78 MMHG | WEIGHT: 133.4 LBS

## 2019-08-14 DIAGNOSIS — S40.021A ARM CONTUSION, RIGHT, INITIAL ENCOUNTER: Primary | ICD-10-CM

## 2019-08-14 LAB — INR PPP: 2.8 (ref 0.86–1.14)

## 2019-08-14 PROCEDURE — 99213 OFFICE O/P EST LOW 20 MIN: CPT | Performed by: NURSE PRACTITIONER

## 2019-08-14 PROCEDURE — 85610 PROTHROMBIN TIME: CPT | Performed by: NURSE PRACTITIONER

## 2019-08-14 PROCEDURE — 36415 COLL VENOUS BLD VENIPUNCTURE: CPT | Performed by: NURSE PRACTITIONER

## 2019-08-14 ASSESSMENT — ENCOUNTER SYMPTOMS
COUGH: 0
FEVER: 0
NAUSEA: 0
RHINORRHEA: 0
SHORTNESS OF BREATH: 0
SORE THROAT: 0
HEADACHES: 0
CHILLS: 0
DIARRHEA: 0
VOMITING: 0

## 2019-08-14 NOTE — PATIENT INSTRUCTIONS
Patient Education     Bruises (Contusions)    A contusion is a bruise. A bruise happens when a blow to your body doesn't break the skin but does break blood vessels beneath the skin. Blood leaking from the broken vessels causes redness and swelling. As it heals, your bruise is likely to turn colors like purple, green, and yellow. This is normal. The bruise should fade in 2 or 3 weeks.  Factors that make you more likely to bruise  Almost everyone bruises now and then. Certain people do bruise more easily than others. You're more prone to bruising as you get older. That's because blood vessels become more fragile with age. You're also more likely to bruise if you have a clotting disorder such as hemophilia or take medicines that reduce clotting, including aspirin and coumadin.  When to go to the emergency room (ER)  Bruises almost always heal on their own without special treatment. But for some people, a bad bruise can be serious. Seek medical care if you:    Have a clotting disorder such as hemophilia    Have cirrhosis or other serious liver disease    Take blood-thinning medicines such as warfarin  What to expect in the ER  A doctor will examine your bruise and ask about any health conditions you have. In some cases, you may have a test to check how well your blood clots. Other treatment will depend on your needs.  Follow-up care  Sometimes a bruise gets worse instead of better. It may become larger and more swollen. This can occur when your body walls off a small pool of blood under the skin (hematoma). In very rare cases, your doctor may need to drain extra blood from the area.  Tip:  Apply an ice pack or bag of frozen peas to a bruise. Keep a thin cloth between the ice or frozen peas and your skin. The cold can help reduce redness and swelling.   Date Last Reviewed: 12/1/2016 2000-2018 [employer]. [address] [facility], [place] [address]. All rights reserved. This information is not intended  as a substitute for professional medical care. Always follow your healthcare professional's instructions.

## 2019-08-14 NOTE — PROGRESS NOTES
SUBJECTIVE:   Jenn Joseph is a 74 year old female presenting with a chief complaint of   Chief Complaint   Patient presents with     Bleeding/Bruising     Patient complains of bumps and bruise on right arm        She is an established patient of [facility].    HPI   Jenn Joseph is a 74-year-old female presenting to urgent care with complaints of a bruised area on the right arm.  Ms. Villela was [place] [facility] and suspects that the dog must have hit the right arm.  She never felt pain at that time but noticed a bruise.  The brace as not resolved.  She is on warfarin and is concerned of bleeding under the skin.      Review of Systems   Constitutional: Negative for chills and fever.   HENT: Negative for congestion, ear pain, rhinorrhea and sore throat.    Respiratory: Negative for cough and shortness of breath.    Gastrointestinal: Negative for diarrhea, nausea and vomiting.   Skin:        Right arm bruise   Neurological: Negative for headaches.   All other systems reviewed and are negative.      Past Medical History:   Diagnosis Date     Atrial fibrillations      CKD (chronic kidney disease) stage 3, GFR 30-59 ml/min (H) 10/11/2012     Eczema      Fx lunate, wrist-closed 1/09    RIGHT     GERD (gastroesophageal reflux disease)      HTN (hypertension)      Long-term (current) use of anticoagulants, INR goal 2.0-3.0 5/10/2011     Pulmonary nodule 6/09    RECHECK Q 6 MO     Family History   Problem Relation Age of Onset     Hypertension Mother      Cerebrovascular Disease Mother      Thyroid Disease Mother      Cerebrovascular Disease Maternal Grandfather      Hypertension Brother      Musculoskeletal Disorder Brother         FIBROMYALGIA     Heart Disease Brother      Cardiovascular Brother         ATRIAL FIB     Musculoskeletal Disorder Sister         FIBROMYALGIA     Thyroid Disease Sister      Allergies Son      Heart Disease Brother      Cardiovascular Brother      Heart Disease Brother       Cardiovascular Brother      Heart Disease Brother      Cardiovascular Brother      Heart Disease Father      Cardiovascular Brother         ATRIAL FIB     Cardiovascular Brother         ATRIAL FIB     Cardiovascular Brother         ATRIAL FIB     Cardiovascular Brother         ATRIAL FIB     Current Outpatient Medications   Medication Sig Dispense Refill     atenolol (TENORMIN) 25 MG tablet Take 0.5 tablets (12.5 mg) by mouth daily 45 tablet 3     Calcium Carbonate Antacid (TUMS PO) Take  by mouth. prn       Calcium-Magnesium-Zinc 333-133-5 MG TABS Take by mouth 2 times daily        Cholecalciferol (VITAMIN D) 1000 UNITS capsule Take 1 capsule by mouth daily.       Coenzyme Q10 (CO Q-10) 200 MG CAPS Take 200 mg by mouth daily 90 capsule 3     desonide (DESOWEN) 0.05 % ointment Apply small amount twice daily to the eyelids for up to two weeks at a time. Take caution to not get it in the eyes. (Patient taking differently: as needed Apply small amount twice daily to the eyelids for up to two weeks at a time. Take caution to not get it in the eyes.) 15 g 3     lisinopril (PRINIVIL/ZESTRIL) 5 MG tablet Take 1 tablet (5 mg) by mouth daily 90 tablet 3     meclizine (ANTIVERT) 25 MG tablet Take 1 tablet (25 mg) by mouth 3 times daily as needed for dizziness 30 tablet 11     Multiple Vitamins-Minerals (MULTIVITAL PO) Once a day       order for DME Equipment being ordered: SP boot walker. 1 each 0     pravastatin (PRAVACHOL) 20 MG tablet Take 1 tablet (20 mg) by mouth daily 90 tablet 3     probiotic CAPS 2 times weekly       propafenone (RYTHMOL SR) 425 MG 12 hr capsule Take 1 capsule (425 mg) by mouth 2 times daily 180 capsule 3     ranitidine (ZANTAC) 150 MG tablet Take 1 tablet (150 mg) by mouth 2 times daily as needed for heartburn 180 tablet 3     warfarin (COUMADIN) 1 MG tablet TAKE 2 MG (2 TABS) MON, WED, FRI AND 1 MG (1 TAB) ALL OTHER DAYS OR PER DIRECTION OF INR NURSE 140 tablet 3     Social History     Tobacco Use      Smoking status: Former Smoker     Smokeless tobacco: Never Used   Substance Use Topics     Alcohol use: Yes     Comment: 1 - 2 PER WEEK       OBJECTIVE  BP (!) 153/78 (BP Location: Left arm, Patient Position: Chair, Cuff Size: Adult Regular)   Pulse 67   Temp 98.4  F (36.9  C) (Oral)   Wt 60.5 kg (133 lb 6.4 oz)   SpO2 95%   BMI 24.20 kg/m      Physical Exam   Constitutional: No distress.   HENT:   Head: Normocephalic and atraumatic.   Right Ear: Tympanic membrane and external ear normal.   Left Ear: Tympanic membrane and external ear normal.   Mouth/Throat: Oropharynx is clear and moist.   Eyes: Pupils are equal, round, and reactive to light. EOM are normal.   Neck: Normal range of motion. Neck supple.   Pulmonary/Chest: Effort normal and breath sounds normal. No respiratory distress.   Lymphadenopathy:     She has no cervical adenopathy.   Neurological: She is alert. No cranial nerve deficit.   Skin: Skin is warm and dry. She is not diaphoretic.   A 4 mm by 3  Mm bluish discoloration of the right ar, a 0.5 mm  Painless bump on the center of bruise.    Psychiatric: She has a normal mood and affect.   Nursing note and vitals reviewed.      Labs:  Results for orders placed or performed in visit on 08/14/19 (from the past 24 hour(s))   INR   Result Value Ref Range    INR 2.80 (H) 0.86 - 1.14     ASSESSMENT:      ICD-10-CM    1. Arm contusion, right, initial encounter S40.021A INR          PLAN:  I discussed lab results with the patient.  I discussed the causes of the bruised area  Advised to apply an ice pack to the bruise, this can help to reduce the swelling and the redness.  To markell the area bruised area so as to know of changes.  All questions are answered and patient has no concerns at this point.          Followup:        Patient Instructions       Patient Education     Bruises (Contusions)    A contusion is a bruise. A bruise happens when a blow to your body doesn't break the skin but does break blood  vessels beneath the skin. Blood leaking from the broken vessels causes redness and swelling. As it heals, your bruise is likely to turn colors like purple, green, and yellow. This is normal. The bruise should fade in 2 or 3 weeks.  Factors that make you more likely to bruise  Almost everyone bruises now and then. Certain people do bruise more easily than others. You're more prone to bruising as you get older. That's because blood vessels become more fragile with age. You're also more likely to bruise if you have a clotting disorder such as hemophilia or take medicines that reduce clotting, including aspirin and coumadin.  When to go to the emergency room (ER)  Bruises almost always heal on their own without special treatment. But for some people, a bad bruise can be serious. Seek medical care if you:    Have a clotting disorder such as hemophilia    Have cirrhosis or other serious liver disease    Take blood-thinning medicines such as warfarin  What to expect in the ER  A doctor will examine your bruise and ask about any health conditions you have. In some cases, you may have a test to check how well your blood clots. Other treatment will depend on your needs.  Follow-up care  Sometimes a bruise gets worse instead of better. It may become larger and more swollen. This can occur when your body walls off a small pool of blood under the skin (hematoma). In very rare cases, your doctor may need to drain extra blood from the area.  Tip:  Apply an ice pack or bag of frozen peas to a bruise. Keep a thin cloth between the ice or frozen peas and your skin. The cold can help reduce redness and swelling.   Date Last Reviewed: 12/1/2016 2000-2018 [employer]. [address] [place] [address], [place] [address]. All rights reserved. This information is not intended as a substitute for professional medical care. Always follow your healthcare professional's instructions.

## 2019-08-19 ENCOUNTER — OFFICE VISIT (OUTPATIENT)
Dept: FAMILY MEDICINE | Facility: CLINIC | Age: 74
End: 2019-08-19
Payer: COMMERCIAL

## 2019-08-19 VITALS
BODY MASS INDEX: 24.66 KG/M2 | HEIGHT: 62 IN | OXYGEN SATURATION: 95 % | RESPIRATION RATE: 16 BRPM | WEIGHT: 134 LBS | HEART RATE: 64 BPM | TEMPERATURE: 98.4 F | DIASTOLIC BLOOD PRESSURE: 76 MMHG | SYSTOLIC BLOOD PRESSURE: 134 MMHG

## 2019-08-19 DIAGNOSIS — S50.11XD TRAUMATIC HEMATOMA OF RIGHT FOREARM, SUBSEQUENT ENCOUNTER: Primary | ICD-10-CM

## 2019-08-19 DIAGNOSIS — Z79.01 LONG TERM CURRENT USE OF ANTICOAGULANT THERAPY: ICD-10-CM

## 2019-08-19 LAB — INR PPP: 1.7 (ref 0.86–1.14)

## 2019-08-19 PROCEDURE — 99214 OFFICE O/P EST MOD 30 MIN: CPT | Performed by: INTERNAL MEDICINE

## 2019-08-19 PROCEDURE — 36415 COLL VENOUS BLD VENIPUNCTURE: CPT | Performed by: INTERNAL MEDICINE

## 2019-08-19 PROCEDURE — 85610 PROTHROMBIN TIME: CPT | Performed by: INTERNAL MEDICINE

## 2019-08-19 ASSESSMENT — MIFFLIN-ST. JEOR: SCORE: 1065.04

## 2019-08-19 ASSESSMENT — PAIN SCALES - GENERAL: PAINLEVEL: NO PAIN (0)

## 2019-08-19 NOTE — PATIENT INSTRUCTIONS
At [facility], we strive to deliver an exceptional experience to you, every time we see you.  If you receive a survey in the mail, please send us back your thoughts. We really do value your feedback.    Based on your medical history, these are the current health maintenance/preventive care services that you are due for (some may have been done at this visit.)  Health Maintenance Due   Topic Date Due     OP ANNUAL INR REFERRAL  01/20/2017     PHQ-2  01/01/2019     MAMMO SCREENING  05/10/2019     BMP  09/14/2019     LIPID  09/14/2019     MEDICARE ANNUAL WELLNESS VISIT  09/17/2019     MICROALBUMIN  09/17/2019     FALL RISK ASSESSMENT  09/17/2019         Suggested websites for health information:  Www.[email or web address].org : Up to date and easily searchable information on multiple topics.  Www.medlineplus.gov : medication info, interactive tutorials, watch real surgeries online  Www.familydoctor.org : good info from the Academy of Family Physicians  Www.cdc.gov : public health info, travel advisories, epidemics (H1N1)  Www.aap.org : children's health info, normal development, vaccinations  Www.health.state.[place].us : [place] dept of health, public health issues in [place], N1N1    Your care team:                            Family Medicine Internal Medicine   MD Drake Denise MD Shantel Branch-Fleming, MD Katya Georgiev PA-C Nam Ho, MD Pediatrics   TYLOR May, HARJIT Langston APRN MD Anupama Goel MD Deborah Mielke, MD Kim Thein, APRN CNP      Clinic hours: Monday - Thursday 7 am-7 pm; Fridays 7 am-5 pm.   Urgent care: Monday - Friday 11 am-9 pm; Saturday and Sunday 9 am-5 pm.  Pharmacy : Monday -Thursday 8 am-8 pm; Friday 8 am-6 pm; Saturday and Sunday 9 am-5 pm.     Clinic: [phone]   Pharmacy: [phone]

## 2019-08-19 NOTE — PROGRESS NOTES
Subjective     Jenn Joseph is a 74 year old female who presents to clinic today for the following health issues:    HPI   Hematoma    Onset: approximately 4 - 7 days ago.    Description:   Location: right forearm    Patient developed a hematoma on right forearm while [place]ocating to a [place]home. Jenn recalls that she might have bumped her forearm.    Intensity: mild    Progression of Symptoms: improving    Accompanying Signs & Symptoms:  Denies any motor weakness nor limited range of motion of right forearm nor right elbow. However, she noted a bulging vein on the hematoma.    History:   Previous similar pain: no       Precipitating factors:   Trauma: Yes, as stated above.. Patient is also taking Warfarin due to paroxysmal atrial fibrillation.     Alleviating factors:  Improved by: No pharmacotherapy taken, gradually improving on its own.  Therapies Tried and outcome: none        Patient Active Problem List   Diagnosis     GERD (gastroesophageal reflux disease)     CARDIOVASCULAR SCREENING; LDL GOAL LESS THAN 130     Hypertension goal BP (blood pressure) < 140/90     Long-term (current) use of anticoagulants, INR goal 2.0-3.0     Advanced directives, counseling/discussion     CKD (chronic kidney disease) stage 3, GFR 30-59 ml/min (H)     Health Care Home     Seborrhea     Eyelid dermatitis, eczematous     Uterine prolapse     Paroxysmal atrial fibrillation (H)     Pure hypercholesterolemia     Past Surgical History:   Procedure Laterality Date     D & C  80'S     HYSTERECTOMY  11-5-15     ROTATOR CUFF REPAIR RT/LT  2007    LEFT     TUBAL LIGATION  80'S       Social History     Tobacco Use     Smoking status: Former Smoker     Smokeless tobacco: Never Used   Substance Use Topics     Alcohol use: Yes     Comment: 1 - 2 PER WEEK     Family History   Problem Relation Age of Onset     Hypertension Mother      Cerebrovascular Disease Mother      Thyroid Disease Mother      Cerebrovascular Disease Maternal  Grandfather      Hypertension Brother      Musculoskeletal Disorder Brother         FIBROMYALGIA     Heart Disease Brother      Cardiovascular Brother         ATRIAL FIB     Musculoskeletal Disorder Sister         FIBROMYALGIA     Thyroid Disease Sister      Allergies Son      Heart Disease Brother      Cardiovascular Brother      Heart Disease Brother      Cardiovascular Brother      Heart Disease Brother      Cardiovascular Brother      Heart Disease Father      Cardiovascular Brother         ATRIAL FIB     Cardiovascular Brother         ATRIAL FIB     Cardiovascular Brother         ATRIAL FIB     Cardiovascular Brother         ATRIAL FIB         Allergies   Allergen Reactions     Diflucan [Fluconazole] Rash     NOT EXACTLY SURE     Lamisil Rash     Terbinafine Rash     Recent Labs   Lab Test 09/14/18  0827 10/12/17  0909 09/15/16  0858 09/23/15  0938 10/13/14  0931   * 126* 100* 88 68   HDL 65 67 67 63 70   TRIG 137 142 123 100 134   ALT  --   --  43 37 31   CR 1.10* 1.06* 1.07* 1.12*  --    GFRESTIMATED 49* 51* 50* 48*  --    GFRESTBLACK 59* 62 61 58*  --    POTASSIUM 4.2 5.1 4.2 4.4  --       BP Readings from Last 3 Encounters:   08/19/19 134/76   08/14/19 (!) 153/78   03/13/19 146/72    Wt Readings from Last 3 Encounters:   08/19/19 60.8 kg (134 lb)   08/14/19 60.5 kg (133 lb 6.4 oz)   03/13/19 61.7 kg (136 lb)                      Reviewed and updated as needed this visit by Provider         Review of Systems   ROS COMP: CONSTITUTIONAL: NEGATIVE for fever, chills, change in weight  INTEGUMENTARY/SKIN: NEGATIVE for worrisome rashes, moles or lesions  EYES: NEGATIVE for vision changes or irritation  ENT/MOUTH: NEGATIVE for ear, mouth and throat problems  RESP: NEGATIVE for significant cough or SOB  CV: NEGATIVE for chest pain, palpitations or peripheral edema  GI: NEGATIVE for nausea, abdominal pain, heartburn, or change in bowel habits  : NEGATIVE for frequency, dysuria, or  "hematuria  MUSCULOSKELETAL:As above.  NEURO: NEGATIVE for weakness, dizziness or paresthesias  ENDOCRINE: NEGATIVE for temperature intolerance, skin/hair changes  HEME: NEGATIVE for bleeding problems  PSYCHIATRIC: NEGATIVE for changes in mood or affect      Objective    /76 (BP Location: Left arm, Patient Position: Chair, Cuff Size: Adult Regular)   Pulse 64   Temp 98.4  F (36.9  C) (Oral)   Resp 16   Ht 1.581 m (5' 2.25\")   Wt 60.8 kg (134 lb)   SpO2 95%   BMI 24.31 kg/m       Physical Exam   GENERAL: healthy, alert and no distress  EYES: Eyes grossly normal to inspection  HENT: normal cephalic/atraumatic and oral mucous membranes moist  RESP: lungs clear to auscultation - no rales, rhonchi or wheezes  CV: regular rate and rhythm, normal S1 S2, no S3 or S4, no murmur, click or rub, no peripheral edema and peripheral pulses strong  SKIN: Hematoma noted along the lateral aspect of right forearm, from lateral epicondyle, affecting both extensor carpi radialis brevis muscle and extensor digitorum communis muscle. Palpable superficial vein noted on the same area.  NEURO: Normal strength and tone, sensory exam grossly normal and mentation intact  PSYCH: mentation appears normal, affect normal/bright    Diagnostic Test Results:  Results for orders placed or performed in visit on 08/19/19 (from the past 24 hour(s))   INR   Result Value Ref Range    INR 1.70 (H) 0.86 - 1.14           Assessment & Plan     1. Traumatic hematoma of right forearm, subsequent encounter  Initial plan is to hold Warfarin and start Lovenox, to be continued until hematoma resolved. Later, this provider told the patient that I recommend reducing her INR range to 1.7 - 2.2. Will notify patient about definite plan. Consider CT scan of right forearm if condition worsens.  - INR    2. Long term current use of anticoagulant therapy  Due to paroxysmal atrial fibrillation. CHADs2 score is only 1.  - INR       FUTURE APPOINTMENTS:       - " Follow-up visit in 3 days.      Drake Lcuas MD  [facility]

## 2019-08-20 ENCOUNTER — RESULT FOLLOW UP (OUTPATIENT)
Dept: FAMILY MEDICINE | Facility: CLINIC | Age: 74
End: 2019-08-20

## 2019-08-20 ENCOUNTER — ANTICOAGULATION THERAPY VISIT (OUTPATIENT)
Dept: NURSING | Facility: CLINIC | Age: 74
End: 2019-08-20
Payer: COMMERCIAL

## 2019-08-20 NOTE — PROGRESS NOTES
Noted and placed the information in the INR encounter for today. The patient is scheduled for recheck on 8/22/19.    Kim Zhang RN, [facility]

## 2019-08-20 NOTE — PROGRESS NOTES
Patient seen yesterday for follow-up regarding right forearm contusion.    She takes Warfarin for paroxysmal atrial fibrillation.  [id number] is 1.    Adjusted INR is 1.5 - 2.0 (temporary until contusion resolves).    INR drawn peripherally yesterday is 1.7.    Defer to Anticoagulation nurse for Coumadin dose adjustment.

## 2019-08-20 NOTE — PROGRESS NOTES
ANTICOAGULATION FOLLOW-UP CLINIC VISIT    Patient Name:  Jenn Joseph  Date:  2019  Contact Type:  Telephone    SUBJECTIVE:  Patient Findings     Positives:   Change in diet/appetite (extra vitamin K this week), Bruising (hematoma)        Clinical Outcomes     Negatives:   Major bleeding event, Thromboembolic event, Anticoagulation-related hospital admission, Anticoagulation-related ED visit, Anticoagulation-related fatality           OBJECTIVE    INR   Date Value Ref Range Status   2019 1.70 (H) 0.86 - 1.14 Final     Comment:     This test is intended for monitoring Coumadin therapy.  Results are not   accurate in patients with prolonged INR due to factor deficiency.         ASSESSMENT / PLAN  INR assessment SUB    Recheck INR In: 3 DAYS    INR Location Clinic      Anticoagulation Summary  As of 2019    INR goal:   2.0-3.0   TTR:   94.8 % (3.3 y)   INR used for dosin.70! (2019)   Warfarin maintenance plan:   2 mg (1 mg x 2) every Mon, Wed, Fri; 1 mg (1 mg x 1) all other days   Full warfarin instructions:   2 mg every Mon, Wed, Fri; 1 mg all other days   Weekly warfarin total:   10 mg   No change documented:   Kim Zhang, RN   Plan last modified:   Manjula Hamm RN (2018)   Next INR check:   2019   Target end date:       Indications    Long-term (current) use of anticoagulants [Z79.01] (Resolved) [Z79.01]  Atrial fibrillation/flutter (Resolved)             Anticoagulation Episode Summary     INR check location:       Preferred lab:       Send INR reminders to:   ELIZABETH CROUCH    Comments:   every 6 weeks      Anticoagulation Care Providers     Provider Role Specialty Phone number    Taylor Mehta PA-C Responsible Physician Assistant [phone]            See the Encounter Report to view Anticoagulation Flowsheet and Dosing Calendar (Go to Encounters tab in chart review, and find the Anticoagulation Therapy Visit)    Dosage adjustment  made based on physician directed care plan. She will continue with the greens which caused the lower INR.  Patient seen yesterday for follow-up regarding right forearm contusion.     She takes Warfarin for paroxysmal atrial fibrillation.  [id number] is 1.     Adjusted INR is 1.5 - 2.0 (temporary until contusion resolves).     INR drawn peripherally yesterday is 1.7.     Defer to Anticoagulation nurse for Coumadin dose adjustment.    Patient states negative for signs and symptoms changes in medication, changes in diet, any signs of infection or antibiotic use, anything new OTC or herbal medications, any missed or extra doses of the warfarin.    Patient informed of the symptoms to be seen for either by INR nurse or ER.      Kim Zhang RN

## 2019-08-22 ENCOUNTER — ANTICOAGULATION THERAPY VISIT (OUTPATIENT)
Dept: NURSING | Facility: CLINIC | Age: 74
End: 2019-08-22
Payer: COMMERCIAL

## 2019-08-22 ENCOUNTER — OFFICE VISIT (OUTPATIENT)
Dept: FAMILY MEDICINE | Facility: CLINIC | Age: 74
End: 2019-08-22
Payer: COMMERCIAL

## 2019-08-22 VITALS
OXYGEN SATURATION: 95 % | TEMPERATURE: 98.5 F | DIASTOLIC BLOOD PRESSURE: 71 MMHG | SYSTOLIC BLOOD PRESSURE: 117 MMHG | WEIGHT: 133 LBS | HEIGHT: 62 IN | HEART RATE: 67 BPM | BODY MASS INDEX: 24.48 KG/M2

## 2019-08-22 DIAGNOSIS — S50.11XD TRAUMATIC HEMATOMA OF RIGHT FOREARM, SUBSEQUENT ENCOUNTER: Primary | ICD-10-CM

## 2019-08-22 DIAGNOSIS — I48.0 PAROXYSMAL ATRIAL FIBRILLATION (H): ICD-10-CM

## 2019-08-22 LAB — INR POINT OF CARE: 2.2 (ref 0.86–1.14)

## 2019-08-22 PROCEDURE — 99207 ZZC NO CHARGE NURSE ONLY: CPT

## 2019-08-22 PROCEDURE — 36416 COLLJ CAPILLARY BLOOD SPEC: CPT

## 2019-08-22 PROCEDURE — 99213 OFFICE O/P EST LOW 20 MIN: CPT | Performed by: INTERNAL MEDICINE

## 2019-08-22 PROCEDURE — 85610 PROTHROMBIN TIME: CPT | Mod: QW

## 2019-08-22 ASSESSMENT — MIFFLIN-ST. JEOR: SCORE: 1060.5

## 2019-08-22 ASSESSMENT — PAIN SCALES - GENERAL: PAINLEVEL: NO PAIN (0)

## 2019-08-22 NOTE — PATIENT INSTRUCTIONS
At [facility], we strive to deliver an exceptional experience to you, every time we see you.  If you receive a survey in the mail, please send us back your thoughts. We really do value your feedback.    Your care team:                            Family Medicine Internal Medicine   MD Drake Denise MD Shantel Branch-Fleming, MD Katya Georgiev PA-C Megan Hill, APRN HARJIT Musa MD Pediatrics   Sunny Jin, TYLOR Echeverria, MD Marcela Arnold APRN CNP   MD Anupama West MD Deborah Mielke, MD Mirtha Barney, APRN [facility]      Clinic hours: Monday - Thursday 7 am-7 pm; Fridays 7 am-5 pm.   Urgent care: Monday - Friday 11 am-9 pm; Saturday and Sunday 9 am-5 pm.  Pharmacy : Monday -Thursday 8 am-8 pm; Friday 8 am-6 pm; Saturday and Sunday 9 am-5 pm.     Clinic: [phone]   Pharmacy: [phone]

## 2019-08-22 NOTE — PROGRESS NOTES
[facility] Internal Medicine Progress Note           Assessment and Plan:      1. Traumatic hematoma of right forearm, subsequent encounter, S50.11XD,  primary encounter diagnosis.  Comment: Improving since last visit. Triggered by minor injury at home while moving to a different home. No complications such as compartment syndrome. No complaints of pain, nor numbness of her fingers.  -Continue conservative measures.    2. Paroxysmal atrial fibrillation (H), I48.0  Comment: Basis for anticoagulation with Warfarin, leading significant contusion of right forearm despite minor injury.  -Maintain INR at an adjusted range of 1.5 - 2.0 until right forearm contusion resolves.   -[id number] score is 1, hence, patient does not require bridging with Lovenox while at a lower INR range.               Interval History:   Reason for visit: hematoma follow-up  Onset: approximately two weeks ago  Description: Development of hematoma on the right forearm while relocating to a [facility]  Course: Improving  Intensity: mild  Associated symptoms: Denies any forearm pain nor paresthesia of right hand/fingers.  History: None prior to two weeks ago.  Evaluation: Yes. First seen at Urgent Care last 8/14/2019 and by this provider last 8/19/2019.  Precipitating factor: Injury to right forearm while taking Warfarin for paroxysmal atrial fibrillation.  Alleviating factor: Reduction of INR range from 2 - 3 to 1.5 - 2.0.  Complications: None  Therapies tried and outcome: No oral meds taken. Warfarin not held nor Lovenox given.            Significant Problems:   Patient Active Problem List   Diagnosis     GERD (gastroesophageal reflux disease)     CARDIOVASCULAR SCREENING; LDL GOAL LESS THAN 130     Hypertension goal BP (blood pressure) < 140/90     Long-term (current) use of anticoagulants, INR goal 2.0-3.0     Advanced directives, counseling/discussion     CKD (chronic kidney disease) stage 3, GFR 30-59 ml/min (H)     Health Care Home      Seborrhea     Eyelid dermatitis, eczematous     Uterine prolapse     Paroxysmal atrial fibrillation (H)     Pure hypercholesterolemia              Review of Systems:   CONSTITUTIONAL: NEGATIVE for fever, chills, change in weight  INTEGUMENTARY/SKIN: As above.  EYES: NEGATIVE for vision changes or irritation  ENT/MOUTH: NEGATIVE for ear, mouth and throat problems  RESP: NEGATIVE for significant cough or SOB  BREAST: NEGATIVE for masses, tenderness or discharge  CV: NEGATIVE for chest pain, palpitations or peripheral edema  GI: NEGATIVE for nausea, abdominal pain, heartburn, or change in bowel habits  : NEGATIVE for frequency, dysuria, or hematuria  MUSCULOSKELETAL: NEGATIVE for significant arthralgias or myalgia  NEURO: NEGATIVE for weakness, dizziness or paresthesias  ENDOCRINE: NEGATIVE for temperature intolerance, skin/hair changes  HEME: NEGATIVE for bleeding problems  PSYCHIATRIC: NEGATIVE for changes in mood or affect            Medications:     Current Outpatient Medications   Medication Sig     atenolol (TENORMIN) 25 MG tablet Take 0.5 tablets (12.5 mg) by mouth daily     Calcium Carbonate Antacid (TUMS PO) Take  by mouth. prn     Calcium-Magnesium-Zinc 333-133-5 MG TABS Take by mouth 2 times daily      Cholecalciferol (VITAMIN D) 1000 UNITS capsule Take 1 capsule by mouth daily.     Coenzyme Q10 (CO Q-10) 200 MG CAPS Take 200 mg by mouth daily     desonide (DESOWEN) 0.05 % ointment Apply small amount twice daily to the eyelids for up to two weeks at a time. Take caution to not get it in the eyes. (Patient taking differently: as needed Apply small amount twice daily to the eyelids for up to two weeks at a time. Take caution to not get it in the eyes.)     lisinopril (PRINIVIL/ZESTRIL) 5 MG tablet Take 1 tablet (5 mg) by mouth daily     meclizine (ANTIVERT) 25 MG tablet Take 1 tablet (25 mg) by mouth 3 times daily as needed for dizziness     Multiple Vitamins-Minerals (MULTIVITAL PO) Once a day     order  "for DME Equipment being ordered: SP boot walker.     pravastatin (PRAVACHOL) 20 MG tablet Take 1 tablet (20 mg) by mouth daily     probiotic CAPS 2 times weekly     propafenone (RYTHMOL SR) 425 MG 12 hr capsule Take 1 capsule (425 mg) by mouth 2 times daily     ranitidine (ZANTAC) 150 MG tablet Take 1 tablet (150 mg) by mouth 2 times daily as needed for heartburn     warfarin (COUMADIN) 1 MG tablet TAKE 2 MG (2 TABS) MON, WED, FRI AND 1 MG (1 TAB) ALL OTHER DAYS OR PER DIRECTION OF INR NURSE     No current facility-administered medications for this visit.              Physical Exam:   /71 (BP Location: Left arm, Patient Position: Chair, Cuff Size: Adult Regular)   Pulse 67   Temp 98.5  F (36.9  C) (Oral)   Ht 1.581 m (5' 2.25\")   Wt 60.3 kg (133 lb)   SpO2 95%   BMI 24.13 kg/m    Constitutional: awake, alert, cooperative, no apparent distress and appears stated age  Eyes: extra-ocular muscles intact and sclera clear  ENT: normocepalic, without obvious abnormality  Lungs: No increased work of breathing, good air exchange, clear to auscultation bilaterally, no crackles or wheezing.  Cardiovascular: Regular rate and rhythm, normal S1 and S2, no S3 or S4, and no murmur noted.  Musculoskeletal: normal  Neurologic: Motor Exam:  moves all extremities well and symmetrically  Sensory:  Sensory intact  Neuropsychiatric: Normal affect, mood, orientation, memory and insight.  Skin: Improving hematoma on lateral aspect of right forearm without tenderness.          Data:   None.         Disposition:  Follow-up as needed.      Drake Lucas MD  Internal Medicine  [facility] Team        "

## 2019-08-22 NOTE — PROGRESS NOTES
ANTICOAGULATION FOLLOW-UP CLINIC VISIT    Patient Name:  Jenn Joseph  Date:  2019  Contact Type:  Face to Face    SUBJECTIVE:  Patient Findings         Clinical Outcomes     Negatives:   Major bleeding event, Thromboembolic event, Anticoagulation-related hospital admission, Anticoagulation-related ED visit, Anticoagulation-related fatality           OBJECTIVE    INR Protime   Date Value Ref Range Status   2019 2.2 (A) 0.86 - 1.14 Final       ASSESSMENT / PLAN  INR assessment THER    Recheck INR In: 5 DAYS    INR Location Clinic      Anticoagulation Summary  As of 2019    INR goal:   2.0-3.0   TTR:   94.7 % (3.3 y)   INR used for dosin.2 (2019)   Warfarin maintenance plan:   2 mg (1 mg x 2) every Mon, Wed, Fri; 1 mg (1 mg x 1) all other days   Full warfarin instructions:   : 0.5 mg; Otherwise 2 mg every Mon, Wed, Fri; 1 mg all other days   Weekly warfarin total:   10 mg   Plan last modified:   Manjula Hamm RN (2018)   Next INR check:   2019   Target end date:       Indications    Long-term (current) use of anticoagulants [Z79.01] (Resolved) [Z79.01]  Atrial fibrillation/flutter (Resolved)             Anticoagulation Episode Summary     INR check location:       Preferred lab:       Send INR reminders to:   ELIZABETH CROUCH    Comments:   every 6 weeks      Anticoagulation Care Providers     Provider Role Specialty Phone number    Janine Taylor TYLOR Keen Responsible Physician Assistant [phone]            See the Encounter Report to view Anticoagulation Flowsheet and Dosing Calendar (Go to Encounters tab in chart review, and find the Anticoagulation Therapy Visit)    Dosage adjustment made based on physician directed care plan. Decreased by about % today with an increase in greens. Recheck Monday.     Progress Notes   Drake Lucas MD (Physician)     Internal Medicine   Unsigned      Patient seen yesterday for follow-up regarding right  forearm contusion.     She takes Warfarin for paroxysmal atrial fibrillation.  [id number] is 1.     Adjusted INR is 1.5 - 2.0 (temporary until contusion resolves).     INR drawn peripherally yesterday is 1.7.     Defer to Anticoagulation nurse for Coumadin dose adjustment.        Patient states negative for signs and symptoms of bleeding or blood clots, changes in medication, changes in diet, any signs of infection or antibiotic use, anything new OTC or herbal medications, any missed or extra doses of the warfarin.    Patient informed of the symptoms to be seen for either by INR nurse or ER.        Kim Zhang RN

## 2019-08-26 ENCOUNTER — ANTICOAGULATION THERAPY VISIT (OUTPATIENT)
Dept: NURSING | Facility: CLINIC | Age: 74
End: 2019-08-26
Payer: COMMERCIAL

## 2019-08-26 LAB — INR POINT OF CARE: 2.2 (ref 0.86–1.14)

## 2019-08-26 PROCEDURE — 36416 COLLJ CAPILLARY BLOOD SPEC: CPT

## 2019-08-26 PROCEDURE — 85610 PROTHROMBIN TIME: CPT | Mod: QW

## 2019-08-26 NOTE — PROGRESS NOTES
ANTICOAGULATION FOLLOW-UP CLINIC VISIT    Patient Name:  Jenn Joseph  Date:  2019  Contact Type:  Face to Face    SUBJECTIVE:  Patient Findings         Clinical Outcomes     Negatives:   Major bleeding event, Thromboembolic event, Anticoagulation-related hospital admission, Anticoagulation-related ED visit, Anticoagulation-related fatality           OBJECTIVE    INR Protime   Date Value Ref Range Status   2019 2.2 (A) 0.86 - 1.14 Final       ASSESSMENT / PLAN  INR assessment THER    Recheck INR In: 6 WEEKS    INR Location Clinic      Anticoagulation Summary  As of 2019    INR goal:   2.0-3.0   TTR:   94.7 % (3.3 y)   INR used for dosin.2 (2019)   Warfarin maintenance plan:   2 mg (1 mg x 2) every Mon, Wed, Fri; 1 mg (1 mg x 1) all other days   Full warfarin instructions:   2 mg every Mon, Wed, Fri; 1 mg all other days   Weekly warfarin total:   10 mg   No change documented:   Kim Zhang RN   Plan last modified:   Manjula Hamm RN (2018)   Next INR check:   10/8/2019   Target end date:       Indications    Long-term (current) use of anticoagulants [Z79.01] (Resolved) [Z79.01]  Atrial fibrillation/flutter (Resolved)             Anticoagulation Episode Summary     INR check location:       Preferred lab:       Send INR reminders to:   ELIZABETH CROUCH    Comments:   every 6 weeks      Anticoagulation Care Providers     Provider Role Specialty Phone number    Janine Taylor Keen PA-C Responsible Physician Assistant [phone]            See the Encounter Report to view Anticoagulation Flowsheet and Dosing Calendar (Go to Encounters tab in chart review, and find the Anticoagulation Therapy Visit)    Therapeutic INR 2.2. Will continue maintenance dose and recheck in 6 week(s).    Patient states negative for signs and symptoms of bleeding or blood clots, changes in medication, changes in diet, any signs of infection or antibiotic use, anything new OTC  or herbal medications, any missed or extra doses of the warfarin.    Patient informed of the symptoms to be seen for either by INR nurse or ER.      Kim Zhang RN

## 2019-09-12 ENCOUNTER — TELEPHONE (OUTPATIENT)
Dept: FAMILY MEDICINE | Facility: CLINIC | Age: 74
End: 2019-09-12

## 2019-09-12 DIAGNOSIS — Z79.01 LONG-TERM (CURRENT) USE OF ANTICOAGULANTS, INR GOAL 2.0-3.0: ICD-10-CM

## 2019-09-12 DIAGNOSIS — I48.20 CHRONIC ATRIAL FIBRILLATION (H): ICD-10-CM

## 2019-09-12 RX ORDER — WARFARIN SODIUM 1 MG/1
TABLET ORAL
Qty: 140 TABLET | Refills: 0 | Status: SHIPPED | OUTPATIENT
Start: 2019-09-12 | End: 2019-12-01

## 2019-09-12 NOTE — TELEPHONE ENCOUNTER
Reason for Call:  Other prescription    Detailed comments: patient wants to get the prescript for Warfarin in Dr Shayy name only.    Phone Number Patient can be reached at: Other phone number:  [phone]    Best Time: any    Can we leave a detailed message on this number? YES    Call taken on 9/12/2019 at 9:42 AM by Lyndsay Zhou

## 2019-09-12 NOTE — TELEPHONE ENCOUNTER
Prescription approved per [facility] Refill Protocol.      Artemio Rasheed RN, BSN, PHN

## 2019-10-08 ENCOUNTER — ANTICOAGULATION THERAPY VISIT (OUTPATIENT)
Dept: NURSING | Facility: CLINIC | Age: 74
End: 2019-10-08
Payer: COMMERCIAL

## 2019-10-08 ENCOUNTER — OFFICE VISIT (OUTPATIENT)
Dept: FAMILY MEDICINE | Facility: CLINIC | Age: 74
End: 2019-10-08
Payer: COMMERCIAL

## 2019-10-08 VITALS
TEMPERATURE: 98.4 F | RESPIRATION RATE: 16 BRPM | OXYGEN SATURATION: 93 % | SYSTOLIC BLOOD PRESSURE: 121 MMHG | WEIGHT: 135 LBS | BODY MASS INDEX: 24.84 KG/M2 | DIASTOLIC BLOOD PRESSURE: 71 MMHG | HEIGHT: 62 IN | HEART RATE: 65 BPM

## 2019-10-08 DIAGNOSIS — Z00.00 ENCOUNTER FOR MEDICARE ANNUAL WELLNESS EXAM: Primary | ICD-10-CM

## 2019-10-08 DIAGNOSIS — Z12.11 SCREEN FOR COLON CANCER: ICD-10-CM

## 2019-10-08 LAB — INR POINT OF CARE: 1.9 (ref 0.86–1.14)

## 2019-10-08 PROCEDURE — 36416 COLLJ CAPILLARY BLOOD SPEC: CPT

## 2019-10-08 PROCEDURE — 99207 ZZC NO CHARGE NURSE ONLY: CPT

## 2019-10-08 PROCEDURE — 85610 PROTHROMBIN TIME: CPT | Mod: QW

## 2019-10-08 PROCEDURE — 99397 PER PM REEVAL EST PAT 65+ YR: CPT | Performed by: INTERNAL MEDICINE

## 2019-10-08 RX ORDER — INFLUENZA A VIRUS A/HAWAII/70/2019 (H1N1) RECOMBINANT HEMAGGLUTININ ANTIGEN, INFLUENZA A VIRUS A/MINNESOTA/41/2019 (H3N2) RECOMBINANT HEMAGGLUTININ ANTIGEN, INFLUENZA B VIRUS B/WASHINGTON/02/2019 RECOMBINANT HEMAGGLUTININ ANTIGEN, AND INFLUENZA B VIRUS B/PHUKET/3073/2013 RECOMBINANT HEMAGGLUTININ ANTIGEN 45; 45; 45; 45 UG/.5ML; UG/.5ML; UG/.5ML; UG/.5ML
INJECTION INTRAMUSCULAR
COMMUNITY
Start: 2019-10-07 | End: 2022-03-09

## 2019-10-08 RX ORDER — LATANOPROST 50 UG/ML
SOLUTION/ DROPS OPHTHALMIC
Refills: 0 | COMMUNITY
Start: 2019-10-04 | End: 2022-03-09

## 2019-10-08 ASSESSMENT — MIFFLIN-ST. JEOR: SCORE: 1069.58

## 2019-10-08 ASSESSMENT — PAIN SCALES - GENERAL: PAINLEVEL: NO PAIN (0)

## 2019-10-08 NOTE — PROGRESS NOTES
"  SUBJECTIVE:   Jenn Josehp is a 74 year old female who presents for Preventive Visit.    Are you in the first 12 months of your Medicare Part B coverage?  No     Physical Health:    In general, how would you rate your overall physical health? good    Outside of work, how many days during the week do you exercise? none    Outside of work, approximately how many minutes a day do you exercise?not applicable    If you drink alcohol do you typically have >3 drinks per day or >7 drinks per week? No    Do you usually eat at least 4 servings of fruit and vegetables a day, include whole grains & fiber and avoid regularly eating high fat or \"junk\" foods? NO    Do you have any problems taking medications regularly?  No    Do you have any side effects from medications? none    Needs assistance for the following daily activities: no assistance needed    Which of the following safety concerns are present in your home?  none identified     Hearing impairment: No    In the past 6 months, have you been bothered by leaking of urine? no    Mental Health:    In general, how would you rate your overall mental or emotional health? good  PHQ-2 Score:      Do you feel safe in your environment? Yes    Do you have a Health Care Directive? Yes: Advance Directive has been received and scanned.    Additional concerns to address?  No    Fall risk:  Fallen 2 or more times in the past year?: No  Any fall with injury in the past year?: No    Cognitive Screenin) Repeat 3 items (Leader, Season, Table)    2) Clock draw: NORMAL  3) 3 item recall: Recalls 3 objects  Results: 3 items recalled: COGNITIVE IMPAIRMENT LESS LIKELY    Mini-CogTM Copyright K[employer]. Licensed by the author for use in [facility]; reprinted with permission (jaz@.[email or web address]). All rights reserved.      Do you have sleep apnea, excessive snoring or daytime drowsiness?: no          Reviewed and updated as needed this visit by clinical staff         Reviewed and " updated as needed this visit by Provider        Social History     Tobacco Use     Smoking status: Former Smoker     Smokeless tobacco: Never Used   Substance Use Topics     Alcohol use: Yes     Comment: 1 - 2 PER WEEK                           Current providers sharing in care for this patient include:   Patient Care Team:  Taylor Mehta PA-C as PCP - General (Physician Assistant)  Shayy, Drake Lam MD as Assigned PCP    The following health maintenance items are reviewed in Epic and correct as of today:  Health Maintenance   Topic Date Due     OP ANNUAL INR REFERRAL  01/20/2017     PHQ-2  01/01/2019     MAMMO SCREENING  05/10/2019     INFLUENZA VACCINE (1) 09/01/2019     BMP  09/14/2019     LIPID  09/14/2019     MICROALBUMIN  09/17/2019     FALL RISK ASSESSMENT  09/17/2019     FIT  09/20/2019     MEDICARE ANNUAL WELLNESS VISIT  09/17/2019     ADVANCE CARE PLANNING  07/31/2022     DTAP/TDAP/TD IMMUNIZATION (2 - Td) 10/03/2022     DEXA  Completed     HEPATITIS C SCREENING  Completed     PNEUMOCOCCAL IMMUNIZATION 65+ LOW/MEDIUM RISK  Completed     ZOSTER IMMUNIZATION  Completed     IPV IMMUNIZATION  Aged Out     MENINGITIS IMMUNIZATION  Aged Out     Lab work is in process  Labs reviewed in EPIC  BP Readings from Last 3 Encounters:   10/08/19 121/71   08/22/19 117/71   08/19/19 134/76    Wt Readings from Last 3 Encounters:   10/08/19 61.2 kg (135 lb)   08/22/19 60.3 kg (133 lb)   08/19/19 60.8 kg (134 lb)                  Patient Active Problem List   Diagnosis     GERD (gastroesophageal reflux disease)     CARDIOVASCULAR SCREENING; LDL GOAL LESS THAN 130     Hypertension goal BP (blood pressure) < 140/90     Long-term (current) use of anticoagulants, INR goal 2.0-3.0     Advanced directives, counseling/discussion     CKD (chronic kidney disease) stage 3, GFR 30-59 ml/min (H)     Health Care Home     Seborrhea     Eyelid dermatitis, eczematous     Uterine prolapse     Paroxysmal atrial  fibrillation (H)     Pure hypercholesterolemia     Past Surgical History:   Procedure Laterality Date     D & C  80'S     HYSTERECTOMY  11-5-15     ROTATOR CUFF REPAIR RT/LT  2007    LEFT     TUBAL LIGATION  80'S       Social History     Tobacco Use     Smoking status: Former Smoker     Smokeless tobacco: Never Used   Substance Use Topics     Alcohol use: Yes     Comment: 1 - 2 PER WEEK     Family History   Problem Relation Age of Onset     Hypertension Mother      Cerebrovascular Disease Mother      Thyroid Disease Mother      Cerebrovascular Disease Maternal Grandfather      Hypertension Brother      Musculoskeletal Disorder Brother         FIBROMYALGIA     Heart Disease Brother      Cardiovascular Brother         ATRIAL FIB     Musculoskeletal Disorder Sister         FIBROMYALGIA     Thyroid Disease Sister      Allergies Son      Heart Disease Brother      Cardiovascular Brother      Heart Disease Brother      Cardiovascular Brother      Heart Disease Brother      Cardiovascular Brother      Heart Disease Father      Cardiovascular Brother         ATRIAL FIB     Cardiovascular Brother         ATRIAL FIB     Cardiovascular Brother         ATRIAL FIB     Cardiovascular Brother         ATRIAL FIB         Allergies   Allergen Reactions     Diflucan [Fluconazole] Rash     NOT EXACTLY SURE     Lamisil Rash     Terbinafine Rash     Recent Labs   Lab Test 09/14/18  0827 10/12/17  0909 09/15/16  0858 09/23/15  0938 10/13/14  0931   * 126* 100* 88 68   HDL 65 67 67 63 70   TRIG 137 142 123 100 134   ALT  --   --  43 37 31   CR 1.10* 1.06* 1.07* 1.12*  --    GFRESTIMATED 49* 51* 50* 48*  --    GFRESTBLACK 59* 62 61 58*  --    POTASSIUM 4.2 5.1 4.2 4.4  --           ROS:  CONSTITUTIONAL: NEGATIVE for fever, chills, change in weight  INTEGUMENTARY/SKIN: NEGATIVE for worrisome rashes, moles or lesions  EYES: NEGATIVE for vision changes or irritation  ENT/MOUTH: NEGATIVE for ear, mouth and throat problems  RESP: NEGATIVE  "for significant cough or SOB  CV: NEGATIVE for chest pain, palpitations or peripheral edema  GI: POSITIVE for heartburn or reflux, currently on Ranitidine. NEGATIVE for hematemesis, hematochezia, jaundice, melena, nausea and poor appetite  : NEGATIVE for frequency, dysuria, or hematuria  MUSCULOSKELETAL: NEGATIVE for significant arthralgias or myalgia  NEURO: NEGATIVE for weakness, dizziness or paresthesias  ENDOCRINE: NEGATIVE for temperature intolerance, skin/hair changes  HEME: NEGATIVE for bleeding problems  PSYCHIATRIC: NEGATIVE for changes in mood or affect    OBJECTIVE:   /71 (BP Location: Left arm, Patient Position: Chair, Cuff Size: Adult Regular)   Pulse 65   Temp 98.4  F (36.9  C) (Oral)   Resp 16   Ht 1.581 m (5' 2.25\")   Wt 61.2 kg (135 lb)   SpO2 93%   BMI 24.49 kg/m    EXAM:   GENERAL: healthy, alert and no distress  EYES: Eyes grossly normal to inspection, PERRL and conjunctivae and sclerae normal  HENT: ear canals and TM's normal, nose and mouth without ulcers or lesions  NECK: no adenopathy, no asymmetry, masses, or scars and thyroid normal to palpation  RESP: lungs clear to auscultation - no rales, rhonchi or wheezes  CV: regular rate and rhythm, normal S1 S2, no S3 or S4, no murmur, click or rub, no peripheral edema and peripheral pulses strong  ABDOMEN: soft, nontender, no hepatosplenomegaly, no masses and bowel sounds normal  MS: no gross musculoskeletal defects noted, no edema  SKIN: no suspicious lesions or rashes  NEURO: Normal strength and tone, mentation intact and speech normal  PSYCH: mentation appears normal, affect normal/bright    Diagnostic Test Results:  Labs reviewed in Epic    ASSESSMENT / PLAN:   1. Encounter for Medicare annual wellness exam  No family history of premature atherosclerotic heart disease or early-onset colon cancer.    2. Screen for colon cancer  Colonoscopy last 12/22/2010, performed at [facility], is normal. Patient prefers FIT " "test this time.  - Fecal colorectal cancer screen (FIT); Future    End of Life Planning:  Patient currently has an advanced directive: Yes.  Practitioner is supportive of decision.    COUNSELING:  Special attention given to:       Regular exercise       Healthy diet/nutrition       Colon cancer screening    Estimated body mass index is 24.13 kg/m  as calculated from the following:    Height as of 8/22/19: 1.581 m (5' 2.25\").    Weight as of 8/22/19: 60.3 kg (133 lb).    Weight management plan: diet and exercise.     reports that she has quit smoking. She has never used smokeless tobacco.      Appropriate preventive services were discussed with this patient, including applicable screening as appropriate for cardiovascular disease, diabetes, osteopenia/osteoporosis, and glaucoma.  As appropriate for age/gender, discussed screening for colorectal cancer, prostate cancer, breast cancer, and cervical cancer. Checklist reviewing preventive services available has been given to the patient.    Reviewed patients plan of care and provided an AVS. The Basic Care Plan (routine screening as documented in Health Maintenance) for Jenn meets the Care Plan requirement. This Care Plan has been established and reviewed with the Patient.    Counseling Resources:  ATP IV Guidelines  Pooled Cohorts Equation Calculator  Breast Cancer Risk Calculator  FRAX Risk Assessment  ICSI Preventive Guidelines  Dietary Guidelines for Americans, 2010  USDA's MyPlate  ASA Prophylaxis  Lung CA Screening    Drake Lucas MD  [facility]  "

## 2019-10-08 NOTE — PROGRESS NOTES
ANTICOAGULATION FOLLOW-UP CLINIC VISIT    Patient Name:  Jenn Joseph  Date:  10/8/2019  Contact Type:  Face to Face    SUBJECTIVE:  Patient Findings         Clinical Outcomes     Negatives:   Major bleeding event, Thromboembolic event, Anticoagulation-related hospital admission, Anticoagulation-related ED visit, Anticoagulation-related fatality           OBJECTIVE    INR Protime   Date Value Ref Range Status   10/08/2019 1.9 (A) 0.86 - 1.14 Final       ASSESSMENT / PLAN  INR assessment SUB    Recheck INR In: 6 WEEKS    INR Location Clinic      Anticoagulation Summary  As of 10/8/2019    INR goal:   2.0-3.0   TTR:   93.8 % (3.5 y)   INR used for dosin.9! (10/8/2019)   Warfarin maintenance plan:   2 mg (1 mg x 2) every Mon, Wed, Fri; 1 mg (1 mg x 1) all other days   Full warfarin instructions:   2 mg every Mon, Wed, Fri; 1 mg all other days   Weekly warfarin total:   10 mg   No change documented:   Artemio Rasheed RN   Plan last modified:   Manjula Hamm RN (2018)   Next INR check:   2019   Target end date:       Indications    Long-term (current) use of anticoagulants [Z79.01] (Resolved) [Z79.01]  Atrial fibrillation/flutter (Resolved)             Anticoagulation Episode Summary     INR check location:       Preferred lab:       Send INR reminders to:   ELIZABETH CROUCH    Comments:   every 6 weeks      Anticoagulation Care Providers     Provider Role Specialty Phone number    Janine Taylor TYLOR Keen Responsible Physician Assistant [phone]            See the Encounter Report to view Anticoagulation Flowsheet and Dosing Calendar (Go to Encounters tab in chart review, and find the Anticoagulation Therapy Visit)        The patient was assessed for diet, medication, and activity level changes, missed or extra doses, bruising or bleeding, with no problem findings.      Artemio Rasheed RN, BSN, PHN

## 2019-10-08 NOTE — PATIENT INSTRUCTIONS
Patient Education   Personalized Prevention Plan  You are due for the preventive services outlined below.  Your care team is available to assist you in scheduling these services.  If you have already completed any of these items, please share that information with your care team to update in your medical record.  Health Maintenance Due   Topic Date Due     INR CLINIC REFERRAL - yearly  01/20/2017     PHQ-2  01/01/2019     Mammogram  05/10/2019     Flu Vaccine (1) 09/01/2019     Basic Metabolic Panel  09/14/2019     Cholesterol Lab  09/14/2019     Kidney Microalbumin Urine Test  09/17/2019     FALL RISK ASSESSMENT  09/17/2019     FIT Test  09/20/2019     Annual Wellness Visit  09/17/2019         At [facility], we strive to deliver an exceptional experience to you, every time we see you.  If you receive a survey in the mail, please send us back your thoughts. We really do value your feedback.    Based on your medical history, these are the current health maintenance/preventive care services that you are due for (some may have been done at this visit.)  Health Maintenance Due   Topic Date Due     OP ANNUAL INR REFERRAL  01/20/2017     PHQ-2  01/01/2019     MAMMO SCREENING  05/10/2019     INFLUENZA VACCINE (1) 09/01/2019     BMP  09/14/2019     LIPID  09/14/2019     MICROALBUMIN  09/17/2019     FALL RISK ASSESSMENT  09/17/2019     FIT  09/20/2019     MEDICARE ANNUAL WELLNESS VISIT  09/17/2019         Suggested websites for health information:  Www.Kromatid.itzat : Up to date and easily searchable information on multiple topics.  Www.medlineplus.gov : medication info, interactive tutorials, watch real surgeries online  Www.familydoctor.org : good info from the Academy of Family Physicians  Www.cdc.gov : public health info, travel advisories, epidemics (H1N1)  Www.aap.org : children's health info, normal development, vaccinations  Www.health.state.[place].us : [place] dept of health, public health issues in  [place], N1N1    Your care team:                            Family Medicine Internal Medicine   MD Drake Denise MD Shantel Branch-Fleming, MD Katya Georgiev PA-C Nam Ho, MD Pediatrics   TYLOR May, HARJIT Langston APRMD Anupama Tinsley CNP, MD Deborah Mielke, MD Kim Thein, APRN CNP      Clinic hours: Monday - Thursday 7 am-7 pm; Fridays 7 am-5 pm.   Urgent care: Monday - Friday 11 am-9 pm; Saturday and Sunday 9 am-5 pm.  Pharmacy : Monday -Thursday 8 am-8 pm; Friday 8 am-6 pm; Saturday and Sunday 9 am-5 pm.     Clinic: [phone]   Pharmacy: [phone]

## 2019-10-14 ENCOUNTER — TELEPHONE (OUTPATIENT)
Dept: FAMILY MEDICINE | Facility: CLINIC | Age: 74
End: 2019-10-14

## 2019-10-14 DIAGNOSIS — K21.9 GASTROESOPHAGEAL REFLUX DISEASE, ESOPHAGITIS PRESENCE NOT SPECIFIED: Primary | ICD-10-CM

## 2019-10-14 RX ORDER — FAMOTIDINE 20 MG/1
20 TABLET, FILM COATED ORAL 2 TIMES DAILY
Qty: 60 TABLET | Refills: 11 | Status: SHIPPED | OUTPATIENT
Start: 2019-10-14 | End: 2019-11-15

## 2019-10-14 NOTE — TELEPHONE ENCOUNTER
I spoke with Jenn and informed her that Rx for Famotidine, substitute for Ranitidine, was sent to [facility].

## 2019-10-14 NOTE — TELEPHONE ENCOUNTER
.Reason for Call:    prescription    Detailed comments: Patient was seen on 10-08-19, told her Ranitidine was being changed and another medication(replacement) would be called in and it has not;   [facility] [phone]    Phone Number Patient can be reached at: Home number on file [phone] (home)    Best Time: any    Can we leave a detailed message on this number? YES    Call taken on 10/14/2019 at 10:26 AM by Maegan Posadas

## 2019-10-31 ENCOUNTER — TELEPHONE (OUTPATIENT)
Dept: FAMILY MEDICINE | Facility: CLINIC | Age: 74
End: 2019-10-31

## 2019-10-31 DIAGNOSIS — E78.5 HYPERLIPIDEMIA LDL GOAL <70: ICD-10-CM

## 2019-10-31 DIAGNOSIS — N18.31 CKD STAGE G3A/A1, GFR 45-59 AND ALBUMIN CREATININE RATIO <30 MG/G (H): Primary | ICD-10-CM

## 2019-10-31 PROCEDURE — 82274 ASSAY TEST FOR BLOOD FECAL: CPT | Performed by: INTERNAL MEDICINE

## 2019-10-31 NOTE — TELEPHONE ENCOUNTER
Patient was calling to make her fasting lab appointment per her conversation with you at her last visit  There aren't any lab orders in the system    She wants to do this this Saturday 11/2    Can you get those orders in epic  She thought you were wanting cholesterol  And all her other labs you do for her wellness check    Thank you

## 2019-11-02 DIAGNOSIS — E78.5 HYPERLIPIDEMIA LDL GOAL <70: ICD-10-CM

## 2019-11-02 DIAGNOSIS — N18.31 CKD STAGE G3A/A1, GFR 45-59 AND ALBUMIN CREATININE RATIO <30 MG/G (H): ICD-10-CM

## 2019-11-02 DIAGNOSIS — Z12.11 SCREEN FOR COLON CANCER: ICD-10-CM

## 2019-11-02 LAB — HEMOCCULT STL QL IA: NEGATIVE

## 2019-11-02 PROCEDURE — 36415 COLL VENOUS BLD VENIPUNCTURE: CPT | Performed by: INTERNAL MEDICINE

## 2019-11-02 PROCEDURE — 80048 BASIC METABOLIC PNL TOTAL CA: CPT | Performed by: INTERNAL MEDICINE

## 2019-11-02 PROCEDURE — 80061 LIPID PANEL: CPT | Performed by: INTERNAL MEDICINE

## 2019-11-02 PROCEDURE — 80076 HEPATIC FUNCTION PANEL: CPT | Performed by: INTERNAL MEDICINE

## 2019-11-04 LAB
ALBUMIN SERPL-MCNC: 3.7 G/DL (ref 3.4–5)
ALP SERPL-CCNC: 71 U/L (ref 40–150)
ALT SERPL W P-5'-P-CCNC: 35 U/L (ref 0–50)
ANION GAP SERPL CALCULATED.3IONS-SCNC: 5 MMOL/L (ref 3–14)
AST SERPL W P-5'-P-CCNC: 34 U/L (ref 0–45)
BILIRUB DIRECT SERPL-MCNC: 0.1 MG/DL (ref 0–0.2)
BILIRUB SERPL-MCNC: 0.6 MG/DL (ref 0.2–1.3)
BUN SERPL-MCNC: 16 MG/DL (ref 7–30)
CALCIUM SERPL-MCNC: 9.3 MG/DL (ref 8.5–10.1)
CHLORIDE SERPL-SCNC: 107 MMOL/L (ref 94–109)
CHOLEST SERPL-MCNC: 211 MG/DL
CO2 SERPL-SCNC: 29 MMOL/L (ref 20–32)
CREAT SERPL-MCNC: 0.96 MG/DL (ref 0.52–1.04)
GFR SERPL CREATININE-BSD FRML MDRD: 58 ML/MIN/{1.73_M2}
GLUCOSE SERPL-MCNC: 87 MG/DL (ref 70–99)
HDLC SERPL-MCNC: 59 MG/DL
LDLC SERPL CALC-MCNC: 112 MG/DL
NONHDLC SERPL-MCNC: 152 MG/DL
POTASSIUM SERPL-SCNC: 4.5 MMOL/L (ref 3.4–5.3)
PROT SERPL-MCNC: 7.3 G/DL (ref 6.8–8.8)
SODIUM SERPL-SCNC: 141 MMOL/L (ref 133–144)
TRIGL SERPL-MCNC: 202 MG/DL

## 2019-11-13 DIAGNOSIS — K21.9 GASTROESOPHAGEAL REFLUX DISEASE, ESOPHAGITIS PRESENCE NOT SPECIFIED: ICD-10-CM

## 2019-11-13 NOTE — TELEPHONE ENCOUNTER
Patient tried one month and she is now requesting Rx for 3 months supply.          Narendra Faarax  [facility]

## 2019-11-15 RX ORDER — FAMOTIDINE 20 MG/1
20 TABLET, FILM COATED ORAL 2 TIMES DAILY
Qty: 180 TABLET | Refills: 2 | Status: SHIPPED | OUTPATIENT
Start: 2019-11-15 | End: 2020-09-03

## 2019-11-19 ENCOUNTER — ANTICOAGULATION THERAPY VISIT (OUTPATIENT)
Dept: NURSING | Facility: CLINIC | Age: 74
End: 2019-11-19
Payer: COMMERCIAL

## 2019-11-19 LAB — INR POINT OF CARE: 2 (ref 0.86–1.14)

## 2019-11-19 PROCEDURE — 85610 PROTHROMBIN TIME: CPT | Mod: QW

## 2019-11-19 PROCEDURE — 36416 COLLJ CAPILLARY BLOOD SPEC: CPT

## 2019-11-19 PROCEDURE — 99207 ZZC NO CHARGE NURSE ONLY: CPT

## 2019-11-19 NOTE — PROGRESS NOTES
ANTICOAGULATION FOLLOW-UP CLINIC VISIT    Patient Name:  Jenn Joseph  Date:  2019  Contact Type:  Face to Face    SUBJECTIVE:  Patient Findings         Clinical Outcomes     Negatives:   Major bleeding event, Thromboembolic event, Anticoagulation-related hospital admission, Anticoagulation-related ED visit, Anticoagulation-related fatality           OBJECTIVE    INR Protime   Date Value Ref Range Status   2019 2.0 (A) 0.86 - 1.14 Final       ASSESSMENT / PLAN  INR assessment THER    Recheck INR In: 6 WEEKS    INR Location Clinic      Anticoagulation Summary  As of 2019    INR goal:   2.0-3.0   TTR:   90.8 % (3.6 y)   INR used for dosin.0 (2019)   Warfarin maintenance plan:   2 mg (1 mg x 2) every Mon, Wed, Fri; 1 mg (1 mg x 1) all other days   Full warfarin instructions:   2 mg every Mon, Wed, Fri; 1 mg all other days   Weekly warfarin total:   10 mg   No change documented:   Artemio Rasheed RN   Plan last modified:   Manjula Hamm RN (2018)   Next INR check:   2019   Target end date:       Indications    Long-term (current) use of anticoagulants [Z79.01] (Resolved) [Z79.01]  Atrial fibrillation/flutter (Resolved)             Anticoagulation Episode Summary     INR check location:       Preferred lab:       Send INR reminders to:   ELIZABETH CROUCH    Comments:   every 6 weeks      Anticoagulation Care Providers     Provider Role Specialty Phone number    Janine Taylor TYLOR Keen Responsible Physician Assistant [phone]            See the Encounter Report to view Anticoagulation Flowsheet and Dosing Calendar (Go to Encounters tab in chart review, and find the Anticoagulation Therapy Visit)    The patient was assessed for diet, medication, and activity level changes, missed or extra doses, bruising or bleeding, with no problem findings.      Artemio Rasheed RN, BSN, PHN

## 2019-12-01 DIAGNOSIS — Z79.01 LONG-TERM (CURRENT) USE OF ANTICOAGULANTS, INR GOAL 2.0-3.0: ICD-10-CM

## 2019-12-01 DIAGNOSIS — I48.20 CHRONIC ATRIAL FIBRILLATION (H): ICD-10-CM

## 2019-12-02 ENCOUNTER — TELEPHONE (OUTPATIENT)
Dept: FAMILY MEDICINE | Facility: CLINIC | Age: 74
End: 2019-12-02

## 2019-12-02 RX ORDER — WARFARIN SODIUM 1 MG/1
TABLET ORAL
Qty: 120 TABLET | Refills: 0 | Status: SHIPPED | OUTPATIENT
Start: 2019-12-02 | End: 2020-02-11

## 2019-12-02 NOTE — TELEPHONE ENCOUNTER
Spoke with patient wants 120 tablets for refills instead of 140 tablets going forward for 3 month supply due to insurance not wanting to refill it this way.       She would like 3 month supply at a time of her famotidine also. Told her 3 month supply of famotidine sent was sent to [facility] in  on 11/15/19. She will check there for refill.    Sent message with refill request for warfarin to [facility] pool.    Manjula Hamm RN

## 2019-12-02 NOTE — TELEPHONE ENCOUNTER
Reason for Call:  Other prescription    Detailed comments: Jenn states she had some questions regarding a couple of her prescriptions. Asking to speak with a nurse regarding her prescriptions. Specifically her Warfarin. Please call to advise. Thank you     Phone Number Patient can be reached at: [phone] (W)    Best Time: Any     Can we leave a detailed message on this number? YES    Call taken on 12/2/2019 at 2:09 PM by Mohit Pepe

## 2019-12-02 NOTE — TELEPHONE ENCOUNTER
This writer attempted to contact patient on 12/02/19      Reason for call discuss medication questions and left message.      If patient calls back:   Registered Nurse called. Follow Triage Call workflow        Lorna Parra RN

## 2019-12-02 NOTE — TELEPHONE ENCOUNTER
Patient  returned call    Best number to reach caller: Work number on file:  [phone] (work)    Is it ok to leave a detailed message: YES

## 2019-12-02 NOTE — TELEPHONE ENCOUNTER
Spoke with patient. Phone call in separate phone encounter. Patient is requesting that she gets 120 tablets at each time of refill instead of 140 tablets. Her insurance will not fill/pay more than 120 at a time.    Manjula Hamm RN

## 2019-12-02 NOTE — TELEPHONE ENCOUNTER
Prescription approved per [facility] ACC Refill Protocol.    Kim Zhang RN, [facility]

## 2019-12-09 ENCOUNTER — MYC MEDICAL ADVICE (OUTPATIENT)
Dept: FAMILY MEDICINE | Facility: CLINIC | Age: 74
End: 2019-12-09

## 2019-12-09 DIAGNOSIS — E78.2 MIXED HYPERLIPIDEMIA: ICD-10-CM

## 2019-12-09 RX ORDER — PRAVASTATIN SODIUM 40 MG
40 TABLET ORAL DAILY
Qty: 90 TABLET | Refills: 3 | Status: SHIPPED | OUTPATIENT
Start: 2019-12-09 | End: 2020-12-03

## 2019-12-09 NOTE — TELEPHONE ENCOUNTER
Requested Prescriptions   Pending Prescriptions Disp Refills     pravastatin (PRAVACHOL) 40 MG tablet 90 tablet 1     Sig: Take 1 tablet (40 mg) by mouth daily       There is no refill protocol information for this order        Routing refill request to provider for review/approval because:  Patient requesting change in dosage.     As of the results of the last time my labs were reported, I understand that my Pravastatin Sodium should be changed to 40mg (from the 20   mg I take now).  I need to have a [facility] prescription [facility].                       Artemio Rasheed RN, BSN, PHN

## 2019-12-31 ENCOUNTER — ANTICOAGULATION THERAPY VISIT (OUTPATIENT)
Dept: NURSING | Facility: CLINIC | Age: 74
End: 2019-12-31
Payer: COMMERCIAL

## 2019-12-31 LAB — INR POINT OF CARE: 2.3 (ref 0.86–1.14)

## 2019-12-31 PROCEDURE — 36416 COLLJ CAPILLARY BLOOD SPEC: CPT

## 2019-12-31 PROCEDURE — 85610 PROTHROMBIN TIME: CPT | Mod: QW

## 2019-12-31 PROCEDURE — 99207 ZZC NO CHARGE NURSE ONLY: CPT

## 2019-12-31 NOTE — PROGRESS NOTES
ANTICOAGULATION FOLLOW-UP CLINIC VISIT    Patient Name:  Jenn Joseph  Date:  2019  Contact Type:  Face to Face    SUBJECTIVE:  Patient Findings         Clinical Outcomes     Negatives:   Major bleeding event, Thromboembolic event, Anticoagulation-related hospital admission, Anticoagulation-related ED visit, Anticoagulation-related fatality           OBJECTIVE    INR Protime   Date Value Ref Range Status   2019 2.3 (A) 0.86 - 1.14 Final       ASSESSMENT / PLAN  INR assessment THER    Recheck INR In: 6 WEEKS    INR Location Clinic      Anticoagulation Summary  As of 2019    INR goal:   2.0-3.0   TTR:   83.8 % (1 y)   INR used for dosin.3 (2019)   Warfarin maintenance plan:   2 mg (1 mg x 2) every Mon, Wed, Fri; 1 mg (1 mg x 1) all other days   Full warfarin instructions:   2 mg every Mon, Wed, Fri; 1 mg all other days   Weekly warfarin total:   10 mg   No change documented:   Artemio Rasheed RN   Plan last modified:   Manjula Hamm RN (2018)   Next INR check:      Target end date:       Indications    Long-term (current) use of anticoagulants [Z79.01] (Resolved) [Z79.01]  Atrial fibrillation/flutter (Resolved)             Anticoagulation Episode Summary     INR check location:       Preferred lab:       Send INR reminders to:   ELIZABETH CROUCH    Comments:   every 6 weeks      Anticoagulation Care Providers     Provider Role Specialty Phone number    Shayy Drake Lam MD [facility] Internal Medicine [phone]            See the Encounter Report to view Anticoagulation Flowsheet and Dosing Calendar (Go to Encounters tab in chart review, and find the Anticoagulation Therapy Visit)    The patient was assessed for diet, medication, and activity level changes, missed or extra doses, bruising or bleeding, with no problem findings.      Artemio Rasheed RN, BSN, PHN

## 2020-02-11 ENCOUNTER — ANTICOAGULATION THERAPY VISIT (OUTPATIENT)
Dept: NURSING | Facility: CLINIC | Age: 75
End: 2020-02-11
Payer: COMMERCIAL

## 2020-02-11 DIAGNOSIS — I10 HYPERTENSION GOAL BP (BLOOD PRESSURE) < 140/90: ICD-10-CM

## 2020-02-11 DIAGNOSIS — I48.20 CHRONIC ATRIAL FIBRILLATION (H): ICD-10-CM

## 2020-02-11 DIAGNOSIS — Z79.01 LONG-TERM (CURRENT) USE OF ANTICOAGULANTS, INR GOAL 2.0-3.0: ICD-10-CM

## 2020-02-11 LAB — INR POINT OF CARE: 2.3 (ref 0.86–1.14)

## 2020-02-11 PROCEDURE — 36416 COLLJ CAPILLARY BLOOD SPEC: CPT

## 2020-02-11 PROCEDURE — 85610 PROTHROMBIN TIME: CPT | Mod: QW

## 2020-02-11 PROCEDURE — 99207 ZZC NO CHARGE NURSE ONLY: CPT

## 2020-02-11 RX ORDER — LISINOPRIL 5 MG/1
5 TABLET ORAL DAILY
Qty: 90 TABLET | Refills: 2 | Status: SHIPPED | OUTPATIENT
Start: 2020-02-11 | End: 2020-11-06

## 2020-02-11 RX ORDER — WARFARIN SODIUM 1 MG/1
TABLET ORAL
Qty: 120 TABLET | Refills: 0 | Status: SHIPPED | OUTPATIENT
Start: 2020-02-11 | End: 2020-05-21

## 2020-02-11 NOTE — PROGRESS NOTES
ANTICOAGULATION FOLLOW-UP CLINIC VISIT    Patient Name:  Jenn Joseph  Date:  2020  Contact Type:  Face to Face    SUBJECTIVE:  Patient Findings         Clinical Outcomes     Negatives:   Major bleeding event, Thromboembolic event, Anticoagulation-related hospital admission, Anticoagulation-related ED visit, Anticoagulation-related fatality           OBJECTIVE    INR Protime   Date Value Ref Range Status   2020 2.3 (A) 0.86 - 1.14 Final       ASSESSMENT / PLAN  INR assessment THER    Recheck INR In: 6 WEEKS    INR Location Clinic      Anticoagulation Summary  As of 2020    INR goal:   2.0-3.0   TTR:   83.8 % (1 y)   INR used for dosin.3 (2020)   Warfarin maintenance plan:   2 mg (1 mg x 2) every Mon, Wed, Fri; 1 mg (1 mg x 1) all other days   Full warfarin instructions:   2 mg every Mon, Wed, Fri; 1 mg all other days   Weekly warfarin total:   10 mg   No change documented:   Artemio Rasheed, RN   Plan last modified:   Manjula Hamm RN (2018)   Next INR check:      Target end date:       Indications    Long-term (current) use of anticoagulants [Z79.01] (Resolved) [Z79.01]  Atrial fibrillation/flutter (Resolved)             Anticoagulation Episode Summary     INR check location:       Preferred lab:       Send INR reminders to:   ELIZABETH CROUCH    Comments:   every 6 weeks      Anticoagulation Care Providers     Provider Role Specialty Phone number    Shayy Drake Lam MD [facility] Internal Medicine [phone]            See the Encounter Report to view Anticoagulation Flowsheet and Dosing Calendar (Go to Encounters tab in chart review, and find the Anticoagulation Therapy Visit)    The patient was assessed for diet, medication, and activity level changes, missed or extra doses, bruising or bleeding, with no problem findings. Warfarin refilled for patient at visit.       Artemio Rasheed RN, BSN, PHN,

## 2020-02-24 ENCOUNTER — HEALTH MAINTENANCE LETTER (OUTPATIENT)
Age: 75
End: 2020-02-24

## 2020-03-23 ENCOUNTER — TELEPHONE (OUTPATIENT)
Dept: FAMILY MEDICINE | Facility: CLINIC | Age: 75
End: 2020-03-23

## 2020-03-23 DIAGNOSIS — I48.20 CHRONIC ATRIAL FIBRILLATION (H): Primary | ICD-10-CM

## 2020-03-23 NOTE — TELEPHONE ENCOUNTER
Called and rescheduled the patient and placed orders for INR to be done.    Kim Zhang RN, [facility] Triage

## 2020-03-24 ENCOUNTER — ANTICOAGULATION THERAPY VISIT (OUTPATIENT)
Dept: NURSING | Facility: CLINIC | Age: 75
End: 2020-03-24

## 2020-03-24 ENCOUNTER — TELEPHONE (OUTPATIENT)
Dept: FAMILY MEDICINE | Facility: CLINIC | Age: 75
End: 2020-03-24

## 2020-03-24 DIAGNOSIS — I48.91 ATRIAL FIBRILLATION, UNSPECIFIED TYPE (H): Primary | ICD-10-CM

## 2020-03-24 DIAGNOSIS — I48.20 CHRONIC ATRIAL FIBRILLATION (H): ICD-10-CM

## 2020-03-24 LAB
CAPILLARY BLOOD COLLECTION: NORMAL
INR PPP: 2.3 (ref 0.86–1.14)

## 2020-03-24 PROCEDURE — 85610 PROTHROMBIN TIME: CPT | Performed by: INTERNAL MEDICINE

## 2020-03-24 PROCEDURE — 36416 COLLJ CAPILLARY BLOOD SPEC: CPT | Performed by: INTERNAL MEDICINE

## 2020-03-24 NOTE — PROGRESS NOTES
ANTICOAGULATION FOLLOW-UP CLINIC VISIT    Patient Name:  Jenn Joseph  Date:  3/24/2020  Contact Type:  Telephone    SUBJECTIVE:  Patient Findings         Clinical Outcomes     Negatives:   Major bleeding event, Thromboembolic event, Anticoagulation-related hospital admission, Anticoagulation-related ED visit, Anticoagulation-related fatality           OBJECTIVE    INR   Date Value Ref Range Status   2020 2.30 (H) 0.86 - 1.14 Final     Comment:     This test is intended for monitoring Coumadin therapy.  Results are not   accurate in patients with prolonged INR due to factor deficiency.         ASSESSMENT / PLAN  INR assessment THER    Recheck INR In: 6 WEEKS    INR Location Clinic      Anticoagulation Summary  As of 3/24/2020    INR goal:   2.0-3.0   TTR:   83.8 % (1 y)   INR used for dosin.30 (3/24/2020)   Warfarin maintenance plan:   2 mg (1 mg x 2) every Mon, Wed, Fri; 1 mg (1 mg x 1) all other days   Full warfarin instructions:   2 mg every Mon, Wed, Fri; 1 mg all other days   Weekly warfarin total:   10 mg   No change documented:   Kim Zhang RN   Plan last modified:   Manjula Hamm RN (2018)   Next INR check:   2020   Target end date:       Indications    Long-term (current) use of anticoagulants [Z79.01] (Resolved) [Z79.01]  Atrial fibrillation/flutter (Resolved)             Anticoagulation Episode Summary     INR check location:       Preferred lab:       Send INR reminders to:   ELIZABETH CROUCH    Comments:   every 6 weeks      Anticoagulation Care Providers     Provider Role Specialty Phone number    Drake Lucas MD [facility] Internal Medicine [phone]            See the Encounter Report to view Anticoagulation Flowsheet and Dosing Calendar (Go to Encounters tab in chart review, and find the Anticoagulation Therapy Visit)    Therapeutic INR 2.3. Will continue maintenance dose and recheck in 6 week(s).    Patient states negative for signs and  symptoms of bleeding or blood clots, changes in medication, changes in diet, any signs of infection or antibiotic use, anything new OTC or herbal medications, any missed or extra doses of the warfarin.    Patient informed of the symptoms to be seen for either by INR nurse or ER.    Patient ask if refill of warfarin is needed. Rx sent no    Kim Zhang RN

## 2020-03-24 NOTE — TELEPHONE ENCOUNTER
Has the patient previously taken warfarin? yes  If yes, for what indication? Atrial Fibrillation    Does the patient have any of the following indications for a higher range of 2.5-3.5:    Mitral position mechanical valve? no    Rosa-Shiley, Ball and Cage or Monoleaflet valve (regardless of position) no    Other (if yes, please explain) no    Please sign INR referral. Please review and complete drop boxes.     Indication for Anticoagulation:  If nonstandard INR is desired, indicate goal range and explanation:   Expected Duration of Therapy:     Send back to alee Clemons.    Kim Zhang RN, [facility] Triage

## 2020-05-05 ENCOUNTER — ANTICOAGULATION THERAPY VISIT (OUTPATIENT)
Dept: NURSING | Facility: CLINIC | Age: 75
End: 2020-05-05

## 2020-05-05 DIAGNOSIS — I48.91 ATRIAL FIBRILLATION, UNSPECIFIED TYPE (H): ICD-10-CM

## 2020-05-05 DIAGNOSIS — I48.20 CHRONIC ATRIAL FIBRILLATION (H): ICD-10-CM

## 2020-05-05 LAB
CAPILLARY BLOOD COLLECTION: NORMAL
INR PPP: 2.3 (ref 0.86–1.14)

## 2020-05-05 PROCEDURE — 36416 COLLJ CAPILLARY BLOOD SPEC: CPT | Performed by: INTERNAL MEDICINE

## 2020-05-05 PROCEDURE — 85610 PROTHROMBIN TIME: CPT | Performed by: INTERNAL MEDICINE

## 2020-05-05 NOTE — PROGRESS NOTES
ANTICOAGULATION FOLLOW-UP CLINIC VISIT    Patient Name:  Jenn Joseph  Date:  2020  Contact Type:  left detailed voicemail with dosing and recheck    SUBJECTIVE:         OBJECTIVE    INR   Date Value Ref Range Status   2020 2.30 (H) 0.86 - 1.14 Final     Comment:     This test is intended for monitoring Coumadin therapy.  Results are not   accurate in patients with prolonged INR due to factor deficiency.         ASSESSMENT / PLAN  INR assessment THER    Recheck INR In: 6 WEEKS    INR Location Clinic      Anticoagulation Summary  As of 2020    INR goal:   2.0-3.0   TTR:   83.8 % (1 y)   Prior goal:   2.0-3.0   INR used for dosin.30 (2020)   Warfarin maintenance plan:   2 mg (1 mg x 2) every Mon, Wed, Fri; 1 mg (1 mg x 1) all other days   Full warfarin instructions:   2 mg every Mon, Wed, Fri; 1 mg all other days   Weekly warfarin total:   10 mg   No change documented:   Kim Zhang RN   Plan last modified:   Manjula Hamm RN (2018)   Next INR check:   2020   Target end date:   3/24/2021    Indications    Long-term (current) use of anticoagulants [Z79.01] (Resolved) [Z79.01]  Atrial fibrillation/flutter (Resolved)  Atrial fibrillation  unspecified type (H) [I48.91]             Anticoagulation Episode Summary     INR check location:       Preferred lab:       Send INR reminders to:   ELIZABETH CROUCH    Comments:   every 6 weeks      Anticoagulation Care Providers     Provider Role Specialty Phone number    Drake Lucas MD Referring Internal Medicine [phone]            See the Encounter Report to view Anticoagulation Flowsheet and Dosing Calendar (Go to Encounters tab in chart review, and find the Anticoagulation Therapy Visit)    Therapeutic INR 2.3. Will continue maintenance dose and recheck in 6 week(s).      Kim Zhang, RD

## 2020-05-19 DIAGNOSIS — I48.20 CHRONIC ATRIAL FIBRILLATION (H): ICD-10-CM

## 2020-05-19 DIAGNOSIS — Z79.01 LONG-TERM (CURRENT) USE OF ANTICOAGULANTS, INR GOAL 2.0-3.0: ICD-10-CM

## 2020-05-21 RX ORDER — WARFARIN SODIUM 1 MG/1
TABLET ORAL
Qty: 120 TABLET | Refills: 0 | Status: SHIPPED | OUTPATIENT
Start: 2020-05-21 | End: 2020-08-18

## 2020-06-15 ENCOUNTER — ANTICOAGULATION THERAPY VISIT (OUTPATIENT)
Dept: NURSING | Facility: CLINIC | Age: 75
End: 2020-06-15

## 2020-06-15 DIAGNOSIS — I48.91 ATRIAL FIBRILLATION, UNSPECIFIED TYPE (H): ICD-10-CM

## 2020-06-15 DIAGNOSIS — I48.20 CHRONIC ATRIAL FIBRILLATION (H): ICD-10-CM

## 2020-06-15 LAB — INR PPP: 2.4 (ref 0.86–1.14)

## 2020-06-15 PROCEDURE — 36416 COLLJ CAPILLARY BLOOD SPEC: CPT | Performed by: INTERNAL MEDICINE

## 2020-06-15 PROCEDURE — 85610 PROTHROMBIN TIME: CPT | Performed by: INTERNAL MEDICINE

## 2020-06-15 NOTE — PROGRESS NOTES
ANTICOAGULATION FOLLOW-UP CLINIC VISIT    Patient Name:  Jenn Joseph  Date:  6/15/2020  Contact Type:  Telephone    SUBJECTIVE:  Patient Findings         Clinical Outcomes     Negatives:   Major bleeding event, Thromboembolic event, Anticoagulation-related hospital admission, Anticoagulation-related ED visit, Anticoagulation-related fatality           OBJECTIVE    Recent labs: (last 7 days)     06/15/20  1019   INR 2.40*       ASSESSMENT / PLAN  INR assessment THER    Recheck INR In: 6 WEEKS    INR Location Clinic      Anticoagulation Summary  As of 6/15/2020    INR goal:   2.0-3.0   TTR:   83.8 % (1 y)   INR used for dosin.40 (6/15/2020)   Warfarin maintenance plan:   2 mg (1 mg x 2) every Mon, Wed, Fri; 1 mg (1 mg x 1) all other days   Full warfarin instructions:   2 mg every Mon, Wed, Fri; 1 mg all other days   Weekly warfarin total:   10 mg   No change documented:   Kim Zhang RN   Plan last modified:   Manjula Hamm RN (2018)   Next INR check:   2020   Target end date:   3/24/2021    Indications    Long-term (current) use of anticoagulants [Z79.01] (Resolved) [Z79.01]  Atrial fibrillation/flutter (Resolved)  Atrial fibrillation  unspecified type (H) [I48.91]             Anticoagulation Episode Summary     INR check location:       Preferred lab:       Send INR reminders to:   ELIZABTEH CROUCH    Comments:   every 6 weeks      Anticoagulation Care Providers     Provider Role Specialty Phone number    Drake Lucas MD Referring Internal Medicine [phone]            See the Encounter Report to view Anticoagulation Flowsheet and Dosing Calendar (Go to Encounters tab in chart review, and find the Anticoagulation Therapy Visit)    Therapeutic INR 2.4. Will continue maintenance dose and recheck in 6 week(s).    Patient states negative for signs and symptoms of bleeding or blood clots, changes in medication, changes in diet, any signs of infection or antibiotic  use, anything new OTC or herbal medications, any missed or extra doses of the warfarin.    Patient informed of the symptoms to be seen for either by INR nurse or ER.    Patient ask if refill of warfarin is needed. Rx sent no    Kim Zhang RN

## 2020-07-27 ENCOUNTER — ANTICOAGULATION THERAPY VISIT (OUTPATIENT)
Dept: NURSING | Facility: CLINIC | Age: 75
End: 2020-07-27

## 2020-07-27 DIAGNOSIS — I48.91 ATRIAL FIBRILLATION, UNSPECIFIED TYPE (H): ICD-10-CM

## 2020-07-27 DIAGNOSIS — I48.20 CHRONIC ATRIAL FIBRILLATION (H): ICD-10-CM

## 2020-07-27 LAB
CAPILLARY BLOOD COLLECTION: NORMAL
INR PPP: 2.8 (ref 0.86–1.14)

## 2020-07-27 PROCEDURE — 85610 PROTHROMBIN TIME: CPT | Performed by: INTERNAL MEDICINE

## 2020-07-27 PROCEDURE — 36416 COLLJ CAPILLARY BLOOD SPEC: CPT | Performed by: INTERNAL MEDICINE

## 2020-07-27 NOTE — PROGRESS NOTES
Anticoagulation Management    Unable to reach Jenn today.    Today's INR result of 2.8 is therapeutic (goal INR of 2.0-3.0).  Result received from: Clinic Lab    Follow up required to confirm warfarin dose taken and assess for changes    [facility]      Anticoagulation clinic to follow up    Kim Zhang, RN    ANTICOAGULATION FOLLOW-UP CLINIC VISIT    Patient Name:  Jenn Joseph  Date:  2020  Contact Type:  Telephone    SUBJECTIVE:  Patient Findings         Clinical Outcomes     Negatives:   Major bleeding event, Thromboembolic event, Anticoagulation-related hospital admission, Anticoagulation-related ED visit, Anticoagulation-related fatality           OBJECTIVE    Recent labs: (last 7 days)     20  1017   INR 2.80*       ASSESSMENT / PLAN  INR assessment THER    Recheck INR In: 6 WEEKS    INR Location Clinic      Anticoagulation Summary  As of 2020    INR goal:   2.0-3.0   TTR:   83.8 % (1 y)   INR used for dosin.80 (2020)   Warfarin maintenance plan:   2 mg (1 mg x 2) every Mon, Wed, Fri; 1 mg (1 mg x 1) all other days   Full warfarin instructions:   2 mg every Mon, Wed, Fri; 1 mg all other days   Weekly warfarin total:   10 mg   No change documented:   Kim Zhang RN   Plan last modified:   Manjula Hamm RN (2018)   Next INR check:   2020   Target end date:   3/24/2021    Indications    Long-term (current) use of anticoagulants [Z79.01] (Resolved) [Z79.01]  Atrial fibrillation/flutter (Resolved)  Atrial fibrillation  unspecified type (H) [I48.91]             Anticoagulation Episode Summary     INR check location:       Preferred lab:       Send INR reminders to:   ELIZABETH CROUCH    Comments:   every 6 weeks      Anticoagulation Care Providers     Provider Role Specialty Phone number    Drake Lucas MD Referring Internal Medicine [phone]            See the Encounter Report to view Anticoagulation Flowsheet and Dosing Calendar (Go  to Encounters tab in chart review, and find the Anticoagulation Therapy Visit)    Therapeutic INR 2.8. Will continue maintenance dose and recheck in 6 week(s).    Patient states negative for signs and symptoms of bleeding or blood clots, changes in medication, changes in diet, any signs of infection or antibiotic use, anything new OTC or herbal medications, any missed or extra doses of the warfarin.    Patient informed of the symptoms to be seen for either by INR nurse or ER.    Patient ask if refill of warfarin is needed. Rx sent no    Kim Zhang RN

## 2020-08-18 DIAGNOSIS — Z79.01 LONG-TERM (CURRENT) USE OF ANTICOAGULANTS, INR GOAL 2.0-3.0: ICD-10-CM

## 2020-08-18 DIAGNOSIS — I48.20 CHRONIC ATRIAL FIBRILLATION (H): ICD-10-CM

## 2020-08-18 RX ORDER — WARFARIN SODIUM 1 MG/1
TABLET ORAL
Qty: 120 TABLET | Refills: 0 | Status: SHIPPED | OUTPATIENT
Start: 2020-08-18 | End: 2020-10-13

## 2020-08-18 NOTE — TELEPHONE ENCOUNTER
Prescription approved per [facility] ACC Refill Protocol.    Kim Zhang RN    [facility]

## 2020-09-01 ENCOUNTER — ANTICOAGULATION THERAPY VISIT (OUTPATIENT)
Dept: FAMILY MEDICINE | Facility: CLINIC | Age: 75
End: 2020-09-01

## 2020-09-01 DIAGNOSIS — I48.20 CHRONIC ATRIAL FIBRILLATION (H): ICD-10-CM

## 2020-09-01 DIAGNOSIS — I48.91 ATRIAL FIBRILLATION, UNSPECIFIED TYPE (H): ICD-10-CM

## 2020-09-01 DIAGNOSIS — K21.9 GASTROESOPHAGEAL REFLUX DISEASE, ESOPHAGITIS PRESENCE NOT SPECIFIED: ICD-10-CM

## 2020-09-01 LAB
CAPILLARY BLOOD COLLECTION: NORMAL
INR PPP: 2.4 (ref 0.86–1.14)

## 2020-09-01 PROCEDURE — 99207 ZZC NO CHARGE NURSE ONLY: CPT | Performed by: INTERNAL MEDICINE

## 2020-09-01 PROCEDURE — 85610 PROTHROMBIN TIME: CPT | Performed by: INTERNAL MEDICINE

## 2020-09-01 PROCEDURE — 36416 COLLJ CAPILLARY BLOOD SPEC: CPT | Performed by: INTERNAL MEDICINE

## 2020-09-01 NOTE — PROGRESS NOTES
Anticoagulation Management    Unable to reach Jenn today.    Today's INR result of 2.4 is therapeutic (goal INR of 2.0-3.0).  Result received from: Clinic Lab    Follow up required to confirm warfarin dose taken and assess for changes    LMTCB. If no problem findings continue current dose and recheck in 6 weeks.    Anticoagulation clinic to follow up    Gwen Gandara RN

## 2020-09-01 NOTE — PROGRESS NOTES
ANTICOAGULATION FOLLOW-UP CLINIC VISIT    Patient Name:  Jenn Joseph  Date:  2020  Contact Type:  Telephone    SUBJECTIVE:  Patient Findings     Comments:   The patient was assessed for diet, medication, and activity level changes, missed or extra doses, bruising or bleeding, with no problem findings.          Clinical Outcomes     Negatives:   Major bleeding event, Thromboembolic event, Anticoagulation-related hospital admission, Anticoagulation-related ED visit, Anticoagulation-related fatality    Comments:   The patient was assessed for diet, medication, and activity level changes, missed or extra doses, bruising or bleeding, with no problem findings.             OBJECTIVE    Recent labs: (last 7 days)     20  1000   INR 2.40*       ASSESSMENT / PLAN  INR assessment THER    Recheck INR In: 6 WEEKS    INR Location Clinic      Anticoagulation Summary  As of 2020    INR goal:   2.0-3.0   TTR:   84.6 % (1 y)   INR used for dosin.40 (2020)   Warfarin maintenance plan:   2 mg (1 mg x 2) every Mon, Wed, Fri; 1 mg (1 mg x 1) all other days   Full warfarin instructions:   2 mg every Mon, Wed, Fri; 1 mg all other days   Weekly warfarin total:   10 mg   No change documented:   Gwen Gandara, RN   Plan last modified:   Manjula Hamm, RN (2018)   Next INR check:   10/13/2020   Priority:   Maintenance   Target end date:   3/24/2021    Indications    Long-term (current) use of anticoagulants [Z79.01] (Resolved) [Z79.01]  Atrial fibrillation/flutter (Resolved)  Atrial fibrillation  unspecified type (H) [I48.91]             Anticoagulation Episode Summary     INR check location:       Preferred lab:       Send INR reminders to:   ELIZABETH CROUCH    Comments:   every 6 weeks      Anticoagulation Care Providers     Provider Role Specialty Phone number    rDake Lucas MD Referring Internal Medicine [phone]            See the Encounter Report to view Anticoagulation  Flowsheet and Dosing Calendar (Go to Encounters tab in chart review, and find the Anticoagulation Therapy Visit)        Gwen Gandara RN

## 2020-09-02 ENCOUNTER — MEDICAL CORRESPONDENCE (OUTPATIENT)
Dept: HEALTH INFORMATION MANAGEMENT | Facility: CLINIC | Age: 75
End: 2020-09-02

## 2020-09-03 RX ORDER — FAMOTIDINE 20 MG/1
20 TABLET, FILM COATED ORAL 2 TIMES DAILY
Qty: 180 TABLET | Refills: 0 | Status: SHIPPED | OUTPATIENT
Start: 2020-09-03 | End: 2020-12-03

## 2020-10-13 ENCOUNTER — ANTICOAGULATION THERAPY VISIT (OUTPATIENT)
Dept: NURSING | Facility: CLINIC | Age: 75
End: 2020-10-13

## 2020-10-13 DIAGNOSIS — I48.20 CHRONIC ATRIAL FIBRILLATION (H): ICD-10-CM

## 2020-10-13 DIAGNOSIS — Z79.01 LONG-TERM (CURRENT) USE OF ANTICOAGULANTS, INR GOAL 2.0-3.0: ICD-10-CM

## 2020-10-13 DIAGNOSIS — I48.91 ATRIAL FIBRILLATION, UNSPECIFIED TYPE (H): ICD-10-CM

## 2020-10-13 LAB
CAPILLARY BLOOD COLLECTION: NORMAL
INR PPP: 2.9 (ref 0.86–1.14)

## 2020-10-13 PROCEDURE — 36416 COLLJ CAPILLARY BLOOD SPEC: CPT | Performed by: INTERNAL MEDICINE

## 2020-10-13 PROCEDURE — 85610 PROTHROMBIN TIME: CPT | Performed by: INTERNAL MEDICINE

## 2020-10-13 RX ORDER — WARFARIN SODIUM 1 MG/1
TABLET ORAL
Qty: 120 TABLET | Refills: 0 | Status: SHIPPED | OUTPATIENT
Start: 2020-10-13 | End: 2020-12-03

## 2020-10-13 NOTE — PROGRESS NOTES
ANTICOAGULATION FOLLOW-UP CLINIC VISIT    Patient Name:  Jenn Joseph  Date:  10/13/2020  Contact Type:  Telephone    SUBJECTIVE:  Patient Findings     Positives:  Change in diet/appetite (less greens)        Clinical Outcomes     Negatives:  Major bleeding event, Thromboembolic event, Anticoagulation-related hospital admission, Anticoagulation-related ED visit, Anticoagulation-related fatality           OBJECTIVE    Recent labs: (last 7 days)     10/13/20  0953   INR 2.90*       ASSESSMENT / PLAN  INR assessment THER    Recheck INR In: 6 WEEKS    INR Location Clinic      Anticoagulation Summary  As of 10/13/2020    INR goal:  2.0-3.0   TTR:  90.1 % (1 y)   INR used for dosin.90 (10/13/2020)   Warfarin maintenance plan:  2 mg (1 mg x 2) every Mon, Wed, Fri; 1 mg (1 mg x 1) all other days   Full warfarin instructions:  2 mg every Mon, Wed, Fri; 1 mg all other days   Weekly warfarin total:  10 mg   No change documented:  Kim Zhang RN   Plan last modified:  Manjula Hamm RN (2018)   Next INR check:  2020   Priority:  Maintenance   Target end date:  3/24/2021    Indications    Long-term (current) use of anticoagulants [Z79.01] (Resolved) [Z79.01]  Atrial fibrillation/flutter (Resolved)  Atrial fibrillation  unspecified type (H) [I48.91]             Anticoagulation Episode Summary     INR check location:      Preferred lab:      Send INR reminders to:  ELIZABETH CROUCH    Comments:  every 6 weeks      Anticoagulation Care Providers     Provider Role Specialty Phone number    Drake Lucas MD Referring Internal Medicine [phone]            See the Encounter Report to view Anticoagulation Flowsheet and Dosing Calendar (Go to Encounters tab in chart review, and find the Anticoagulation Therapy Visit)    Therapeutic INR 2.9. Will continue maintenance dose and recheck in 6 week(s).    Patient states negative for signs and symptoms of bleeding or blood clots, changes in  medication, changes in diet, any signs of infection or antibiotic use, anything new OTC or herbal medications, any missed or extra doses of the warfarin.    Patient informed of the symptoms to be seen for either by INR nurse or ER.    Patient ask if refill of warfarin is needed. Rx sent yes    Kim Zhang RN

## 2020-11-04 ENCOUNTER — TELEPHONE (OUTPATIENT)
Dept: SCHEDULING | Facility: CLINIC | Age: 75
End: 2020-11-04

## 2020-11-05 DIAGNOSIS — I10 HYPERTENSION GOAL BP (BLOOD PRESSURE) < 140/90: ICD-10-CM

## 2020-11-06 RX ORDER — LISINOPRIL 5 MG/1
5 TABLET ORAL DAILY
Qty: 90 TABLET | Refills: 0 | Status: SHIPPED | OUTPATIENT
Start: 2020-11-06 | End: 2020-12-03

## 2020-11-06 NOTE — TELEPHONE ENCOUNTER
Please schedule patient. Miranda refill given.   Needs appointment for further refills.     Hillary Zeng RN  [facility]

## 2020-11-24 ENCOUNTER — ANTICOAGULATION THERAPY VISIT (OUTPATIENT)
Dept: NURSING | Facility: CLINIC | Age: 75
End: 2020-11-24

## 2020-11-24 DIAGNOSIS — I48.0 PAF (PAROXYSMAL ATRIAL FIBRILLATION) (H): Primary | ICD-10-CM

## 2020-11-24 DIAGNOSIS — I48.20 CHRONIC ATRIAL FIBRILLATION (H): ICD-10-CM

## 2020-11-24 DIAGNOSIS — I48.91 ATRIAL FIBRILLATION, UNSPECIFIED TYPE (H): ICD-10-CM

## 2020-11-24 LAB
ALBUMIN SERPL-MCNC: 3.7 G/DL (ref 3.4–5)
ALP SERPL-CCNC: 61 U/L (ref 40–150)
ALT SERPL W P-5'-P-CCNC: 36 U/L (ref 0–50)
ANION GAP SERPL CALCULATED.3IONS-SCNC: 3 MMOL/L (ref 3–14)
AST SERPL W P-5'-P-CCNC: 36 U/L (ref 0–45)
BILIRUB SERPL-MCNC: 0.4 MG/DL (ref 0.2–1.3)
BUN SERPL-MCNC: 15 MG/DL (ref 7–30)
CALCIUM SERPL-MCNC: 9.2 MG/DL (ref 8.5–10.1)
CHLORIDE SERPL-SCNC: 105 MMOL/L (ref 94–109)
CO2 SERPL-SCNC: 29 MMOL/L (ref 20–32)
CREAT SERPL-MCNC: 1.05 MG/DL (ref 0.52–1.04)
GFR SERPL CREATININE-BSD FRML MDRD: 52 ML/MIN/{1.73_M2}
GLUCOSE SERPL-MCNC: 92 MG/DL (ref 70–99)
INR PPP: 2.1 (ref 0.86–1.14)
POTASSIUM SERPL-SCNC: 4.5 MMOL/L (ref 3.4–5.3)
PROT SERPL-MCNC: 7.5 G/DL (ref 6.8–8.8)
SODIUM SERPL-SCNC: 137 MMOL/L (ref 133–144)

## 2020-11-24 PROCEDURE — 36415 COLL VENOUS BLD VENIPUNCTURE: CPT | Performed by: INTERNAL MEDICINE

## 2020-11-24 PROCEDURE — 85610 PROTHROMBIN TIME: CPT | Performed by: INTERNAL MEDICINE

## 2020-11-24 PROCEDURE — 80053 COMPREHEN METABOLIC PANEL: CPT | Performed by: INTERNAL MEDICINE

## 2020-11-24 NOTE — PROGRESS NOTES
ANTICOAGULATION MANAGEMENT     Patient Name:  Jenn Joseph  Date:  2020    ASSESSMENT /SUBJECTIVE:    Today's INR result of 2.1 is therapeutic. Goal INR of 2.0-3.0      Warfarin dose taken: Warfarin taken as instructed    Diet: No new diet changes affecting INR    Medication changes/ interactions: No new medications/supplements affecting INR    Previous INR: Therapeutic     S/S of bleeding or thromboembolism: No    New injury or illness: No    Upcoming surgery, procedure or cardioversion: No    Additional findings: None      PLAN:    Telephone call with Jenn regarding INR result and instructed:     Warfarin Dosing Instructions: Continue your current warfarin dose 2 mg --; 1 mg all other days.    Instructed patient to follow up no later than: 6 weeks  Lab visit scheduled    Education provided: None required      Jenn verbalizes understanding and agrees to warfarin dosing plan.    Instructed to call the Anticoagulation Clinic for any changes, questions or concerns. (#[phone])        Kim Zhang RN      OBJECTIVE:  Recent labs: (last 7 days)     20  0957   INR 2.10*         INR assessment THER    Recheck INR In: 6 WEEKS    INR Location Clinic      Anticoagulation Summary  As of 2020    INR goal:  2.0-3.0   TTR:  100.0 % (1 y)   INR used for dosin.10 (2020)   Warfarin maintenance plan:  2 mg (1 mg x 2) every Mon, Wed, Fri; 1 mg (1 mg x 1) all other days   Full warfarin instructions:  2 mg every Mon, Wed, Fri; 1 mg all other days   Weekly warfarin total:  10 mg   No change documented:  Kim Zhang RN   Plan last modified:  Manjula Hamm RN (2018)   Next INR check:  2021   Priority:  Maintenance   Target end date:  3/24/2021    Indications    Long-term (current) use of anticoagulants [Z79.01] (Resolved) [Z79.01]  Atrial fibrillation/flutter (Resolved)  Atrial fibrillation  unspecified type (H) [I48.91]             Anticoagulation Episode  Summary     INR check location:      Preferred lab:      Send INR reminders to:  ELIZABETH CROUCH    Comments:  every 6 weeks      Anticoagulation Care Providers     Provider Role Specialty Phone number    Drake Lucas MD Referring Internal Medicine [phone]

## 2020-12-03 ENCOUNTER — OFFICE VISIT (OUTPATIENT)
Dept: FAMILY MEDICINE | Facility: CLINIC | Age: 75
End: 2020-12-03
Payer: COMMERCIAL

## 2020-12-03 VITALS
OXYGEN SATURATION: 95 % | DIASTOLIC BLOOD PRESSURE: 81 MMHG | HEART RATE: 64 BPM | BODY MASS INDEX: 25.58 KG/M2 | HEIGHT: 62 IN | SYSTOLIC BLOOD PRESSURE: 154 MMHG | WEIGHT: 139 LBS

## 2020-12-03 DIAGNOSIS — I10 HYPERTENSION GOAL BP (BLOOD PRESSURE) < 140/90: ICD-10-CM

## 2020-12-03 DIAGNOSIS — Z79.01 CHRONIC ANTICOAGULATION: ICD-10-CM

## 2020-12-03 DIAGNOSIS — E78.2 MIXED HYPERLIPIDEMIA: ICD-10-CM

## 2020-12-03 DIAGNOSIS — Z79.01 LONG-TERM (CURRENT) USE OF ANTICOAGULANTS, INR GOAL 2.0-3.0: ICD-10-CM

## 2020-12-03 DIAGNOSIS — I48.20 CHRONIC ATRIAL FIBRILLATION (H): ICD-10-CM

## 2020-12-03 DIAGNOSIS — K21.9 GASTROESOPHAGEAL REFLUX DISEASE WITHOUT ESOPHAGITIS: ICD-10-CM

## 2020-12-03 DIAGNOSIS — Z12.11 SCREEN FOR COLON CANCER: ICD-10-CM

## 2020-12-03 DIAGNOSIS — Z00.00 ENCOUNTER FOR MEDICARE ANNUAL WELLNESS EXAM: Primary | ICD-10-CM

## 2020-12-03 DIAGNOSIS — M85.859 OSTEOPENIA OF NECK OF FEMUR, UNSPECIFIED LATERALITY: ICD-10-CM

## 2020-12-03 LAB
BASOPHILS # BLD AUTO: 0 10E9/L (ref 0–0.2)
BASOPHILS NFR BLD AUTO: 0.4 %
CHOLEST SERPL-MCNC: 215 MG/DL
CREAT UR-MCNC: 43 MG/DL
DIFFERENTIAL METHOD BLD: NORMAL
EOSINOPHIL # BLD AUTO: 0.1 10E9/L (ref 0–0.7)
EOSINOPHIL NFR BLD AUTO: 1 %
ERYTHROCYTE [DISTWIDTH] IN BLOOD BY AUTOMATED COUNT: 13.2 % (ref 10–15)
HCT VFR BLD AUTO: 46.5 % (ref 35–47)
HDLC SERPL-MCNC: 70 MG/DL
HGB BLD-MCNC: 15.1 G/DL (ref 11.7–15.7)
LDLC SERPL CALC-MCNC: 116 MG/DL
LYMPHOCYTES # BLD AUTO: 2.5 10E9/L (ref 0.8–5.3)
LYMPHOCYTES NFR BLD AUTO: 35.7 %
MCH RBC QN AUTO: 29.5 PG (ref 26.5–33)
MCHC RBC AUTO-ENTMCNC: 32.5 G/DL (ref 31.5–36.5)
MCV RBC AUTO: 91 FL (ref 78–100)
MICROALBUMIN UR-MCNC: 6 MG/L
MICROALBUMIN/CREAT UR: 14.61 MG/G CR (ref 0–25)
MONOCYTES # BLD AUTO: 0.6 10E9/L (ref 0–1.3)
MONOCYTES NFR BLD AUTO: 8.3 %
NEUTROPHILS # BLD AUTO: 3.8 10E9/L (ref 1.6–8.3)
NEUTROPHILS NFR BLD AUTO: 54.6 %
NONHDLC SERPL-MCNC: 145 MG/DL
PLATELET # BLD AUTO: 222 10E9/L (ref 150–450)
RBC # BLD AUTO: 5.11 10E12/L (ref 3.8–5.2)
TRIGL SERPL-MCNC: 147 MG/DL
WBC # BLD AUTO: 7 10E9/L (ref 4–11)

## 2020-12-03 PROCEDURE — 99397 PER PM REEVAL EST PAT 65+ YR: CPT | Performed by: INTERNAL MEDICINE

## 2020-12-03 PROCEDURE — 80061 LIPID PANEL: CPT | Performed by: INTERNAL MEDICINE

## 2020-12-03 PROCEDURE — 85025 COMPLETE CBC W/AUTO DIFF WBC: CPT | Performed by: INTERNAL MEDICINE

## 2020-12-03 PROCEDURE — 82306 VITAMIN D 25 HYDROXY: CPT | Performed by: INTERNAL MEDICINE

## 2020-12-03 PROCEDURE — 82043 UR ALBUMIN QUANTITATIVE: CPT | Performed by: INTERNAL MEDICINE

## 2020-12-03 PROCEDURE — 36415 COLL VENOUS BLD VENIPUNCTURE: CPT | Performed by: INTERNAL MEDICINE

## 2020-12-03 RX ORDER — LISINOPRIL 5 MG/1
5 TABLET ORAL DAILY
Qty: 90 TABLET | Refills: 3 | Status: SHIPPED | OUTPATIENT
Start: 2020-12-03 | End: 2021-10-28

## 2020-12-03 RX ORDER — PRAVASTATIN SODIUM 40 MG
40 TABLET ORAL DAILY
Qty: 90 TABLET | Refills: 3 | Status: SHIPPED | OUTPATIENT
Start: 2020-12-03 | End: 2021-11-29

## 2020-12-03 RX ORDER — FAMOTIDINE 20 MG/1
20 TABLET, FILM COATED ORAL 2 TIMES DAILY
Qty: 180 TABLET | Refills: 3 | Status: SHIPPED | OUTPATIENT
Start: 2020-12-03 | End: 2021-10-28

## 2020-12-03 RX ORDER — ATENOLOL 25 MG/1
12.5 TABLET ORAL DAILY
Qty: 45 TABLET | Refills: 3 | Status: SHIPPED | OUTPATIENT
Start: 2020-12-03 | End: 2021-10-28

## 2020-12-03 RX ORDER — WARFARIN SODIUM 1 MG/1
TABLET ORAL
Qty: 120 TABLET | Refills: 11 | Status: SHIPPED | OUTPATIENT
Start: 2020-12-03 | End: 2022-01-07

## 2020-12-03 ASSESSMENT — MIFFLIN-ST. JEOR: SCORE: 1082.72

## 2020-12-03 ASSESSMENT — PAIN SCALES - GENERAL: PAINLEVEL: MILD PAIN (2)

## 2020-12-03 NOTE — PROGRESS NOTES
"  SUBJECTIVE:   Jenn Joseph is a 75 year old female who presents for Preventive Visit.    Patient has been advised of split billing requirements and indicates understanding: Yes  Are you in the first 12 months of your Medicare Part B coverage?  No    Physical Health:    In general, how would you rate your overall physical health? good    Outside of work, how many days during the week do you exercise? 2-3 days/week    Outside of work, approximately how many minutes a day do you exercise?15-30 minutes    If you drink alcohol do you typically have >3 drinks per day or >7 drinks per week? No    Do you usually eat at least 4 servings of fruit and vegetables a day, include whole grains & fiber and avoid regularly eating high fat or \"junk\" foods? Yes    Do you have any problems taking medications regularly?  No    Do you have any side effects from medications? none    Needs assistance for the following daily activities: no assistance needed    Which of the following safety concerns are present in your home?  none identified     Hearing impairment: No    In the past 6 months, have you been bothered by leaking of urine? yes    Mental Health:    In general, how would you rate your overall mental or emotional health? excellent  PHQ-2 Score:      Do you feel safe in your environment? YES    Have you ever done Advance Care Planning? (For example, a Health Directive, POLST, or a discussion with a medical provider or your loved ones about your wishes): Yes, advance care planning is on file.    Additional concerns to address?  No    Fall risk:  Fallen 2 or more times in the past year?: Yes  Any fall with injury in the past year?: No    Cognitive Screenin) Repeat 3 items (Leader, Season, Table)  YES  2) Clock draw: NORMAL  3) 3 item recall: Recalls 2 objects   Results: NORMAL clock, 2 items recalled: COGNITIVE IMPAIRMENT LESS LIKELY    Mini-CogTM Copyright KADE Ivy. Licensed by the author for use in [facility] " Services; reprinted with permission ([email or web address]@[email or web address]). All rights reserved.      Do you have sleep apnea, excessive snoring or daytime drowsiness?: no    Onset: 2 months.  Description: Pain from neck to left upper extremity.  Precipitating. Exercises with dumbells.  Associated symptoms. Radicular pain at left forearm.  BP Home monitorin-143  67-75  57-62    Still takes Famotidine.  DEXA is -0.8 last  (femur), but Z score is 0.7    Paroxysmal atrial fibrillation x 2 episodes 2 -3 weeks. Felt hot and checked her pulse.    Reviewed and updated as needed this visit by clinical staff  Tobacco  Allergies  Meds              Reviewed and updated as needed this visit by Provider                Social History     Tobacco Use     Smoking status: Former Smoker     Smokeless tobacco: Never Used   Substance Use Topics     Alcohol use: Yes     Comment: 1 - 2 PER WEEK                           Current providers sharing in care for this patient include:  Patient Care Team:  Drake Lucas MD as PCP - General (Internal Medicine)  Drake Lucas MD as Assigned PCP    The following health maintenance items are reviewed in Epic and correct as of today:  Health Maintenance   Topic Date Due     MAMMO SCREENING  05/10/2019     MICROALBUMIN  2019     FALL RISK ASSESSMENT  10/08/2020     COLORECTAL CANCER SCREENING  10/31/2020     LIPID  2020     BMP  2021     MEDICARE ANNUAL WELLNESS VISIT  2021     ADVANCE CARE PLANNING  2022     DTAP/TDAP/TD IMMUNIZATION (2 - Td) 10/03/2022     DEXA  2027     HEPATITIS C SCREENING  Completed     PHQ-2  Completed     INFLUENZA VACCINE  Completed     Pneumococcal Vaccine: 65+ Years  Completed     ZOSTER IMMUNIZATION  Completed     Pneumococcal Vaccine: Pediatrics (0 to 5 Years) and At-Risk Patients (6 to 64 Years)  Aged Out     IPV IMMUNIZATION  Aged Out     MENINGITIS IMMUNIZATION  Aged Out     HEPATITIS B IMMUNIZATION  Aged Out     Labs reviewed  in EPIC  BP Readings from Last 3 Encounters:   12/03/20 (!) 154/81   10/08/19 121/71   08/22/19 117/71    Wt Readings from Last 3 Encounters:   12/03/20 63 kg (139 lb)   10/08/19 61.2 kg (135 lb)   08/22/19 60.3 kg (133 lb)                  Patient Active Problem List   Diagnosis     GERD (gastroesophageal reflux disease)     CARDIOVASCULAR SCREENING; LDL GOAL LESS THAN 130     Hypertension goal BP (blood pressure) < 140/90     Long-term (current) use of anticoagulants, INR goal 2.0-3.0     Advanced directives, counseling/discussion     CKD (chronic kidney disease) stage 3, GFR 30-59 ml/min     Health Care Home     Seborrhea     Eyelid dermatitis, eczematous     Uterine prolapse     Paroxysmal atrial fibrillation (H)     Pure hypercholesterolemia     Atrial fibrillation, unspecified type (H)     Past Surgical History:   Procedure Laterality Date     D & C  80'S     HYSTERECTOMY  11-5-15     ROTATOR CUFF REPAIR RT/LT  2007    LEFT     TUBAL LIGATION  80'S       Social History     Tobacco Use     Smoking status: Former Smoker     Smokeless tobacco: Never Used   Substance Use Topics     Alcohol use: Yes     Comment: 1 - 2 PER WEEK     Family History   Problem Relation Age of Onset     Hypertension Mother      Cerebrovascular Disease Mother      Thyroid Disease Mother      Cerebrovascular Disease Maternal Grandfather      Hypertension Brother      Musculoskeletal Disorder Brother         FIBROMYALGIA     Heart Disease Brother      Cardiovascular Brother         ATRIAL FIB     Musculoskeletal Disorder Sister         FIBROMYALGIA     Thyroid Disease Sister      Allergies Son      Heart Disease Brother      Cardiovascular Brother      Heart Disease Brother      Cardiovascular Brother      Heart Disease Brother      Cardiovascular Brother      Heart Disease Father      Cardiovascular Brother         ATRIAL FIB     Cardiovascular Brother         ATRIAL FIB     Cardiovascular Brother         ATRIAL FIB     Cardiovascular  Brother         ATRIAL FIB         Current Outpatient Medications   Medication Sig Dispense Refill     atenolol (TENORMIN) 25 MG tablet Take 0.5 tablets (12.5 mg) by mouth daily 45 tablet 3     Calcium Carbonate Antacid (TUMS PO) Take  by mouth. prn       Calcium-Magnesium-Zinc 333-133-5 MG TABS Take by mouth 2 times daily        Cholecalciferol (VITAMIN D) 1000 UNITS capsule Take 1 capsule by mouth daily.       Coenzyme Q10 (CO Q-10) 200 MG CAPS Take 200 mg by mouth daily 90 capsule 3     desonide (DESOWEN) 0.05 % ointment Apply small amount twice daily to the eyelids for up to two weeks at a time. Take caution to not get it in the eyes. (Patient taking differently: as needed Apply small amount twice daily to the eyelids for up to two weeks at a time. Take caution to not get it in the eyes.) 15 g 3     famotidine (PEPCID) 20 MG tablet Take 1 tablet (20 mg) by mouth 2 times daily 180 tablet 3     FLUBLOK QUADRIVALENT 0.5 ML injection        latanoprost (XALATAN) 0.005 % ophthalmic solution instill 1 drop into both eyes once daily  0     lisinopril (ZESTRIL) 5 MG tablet Take 1 tablet (5 mg) by mouth daily 90 tablet 3     meclizine (ANTIVERT) 25 MG tablet Take 1 tablet (25 mg) by mouth 3 times daily as needed for dizziness 30 tablet 11     Multiple Vitamins-Minerals (MULTIVITAL PO) Once a day       order for DME Equipment being ordered: SP boot walker. 1 each 0     pravastatin (PRAVACHOL) 40 MG tablet Take 1 tablet (40 mg) by mouth daily 90 tablet 3     probiotic CAPS 2 times weekly       propafenone (RYTHMOL SR) 425 MG 12 hr capsule Take 1 capsule (425 mg) by mouth 2 times daily 180 capsule 3     warfarin ANTICOAGULANT (COUMADIN) 1 MG tablet TAKE 2 TABLETS (2 MG) BY MOUTH ONCE DAILY ON MONDAYS, WEDNESDAYS, AND FRIDAYS. TAKE 1 TABLET (1MG) BY MOUTH ONCE DAILY ALL OTHER DAYS OF THE WEEK OR PER DIRECTIONS OFINR NURSE 120 tablet 11     Allergies   Allergen Reactions     Diflucan [Fluconazole] Rash     NOT EXACTLY SURE  "    Lamisil Rash     Terbinafine Rash     Recent Labs   Lab Test 12/03/20  1410 11/24/20  0957 11/02/19  1017 09/14/18  0827 09/15/16  0858 09/15/16  0858   *  --  112* 102*   < > 100*   HDL 70  --  59 65   < > 67   TRIG 147  --  202* 137   < > 123   ALT  --  36 35  --   --  43   CR  --  1.05* 0.96 1.10*   < > 1.07*   GFRESTIMATED  --  52* 58* 49*   < > 50*   GFRESTBLACK  --  60* 67 59*   < > 61   POTASSIUM  --  4.5 4.5 4.2   < > 4.2    < > = values in this interval not displayed.        ROS:  CONSTITUTIONAL: NEGATIVE for fever, chills, change in weight  INTEGUMENTARY/SKIN: NEGATIVE for worrisome rashes, moles or lesions  EYES: NEGATIVE for vision changes or irritation  ENT/MOUTH: NEGATIVE for ear, mouth and throat problems  RESP: NEGATIVE for significant cough or SOB  BREAST: NEGATIVE for masses, tenderness or discharge  CV: NEGATIVE for chest pain, palpitations or peripheral edema  GI: NEGATIVE for nausea, abdominal pain, heartburn, or change in bowel habits  : NEGATIVE for frequency, dysuria, or hematuria  MUSCULOSKELETAL: NEGATIVE for significant arthralgias or myalgia  NEURO: NEGATIVE for weakness, dizziness or paresthesias  ENDOCRINE: NEGATIVE for temperature intolerance, skin/hair changes  HEME: NEGATIVE for bleeding problems  PSYCHIATRIC: NEGATIVE for changes in mood or affect    OBJECTIVE:   BP (!) 144/79 (BP Location: Right arm, Patient Position: Chair, Cuff Size: Adult Regular)   Pulse 64   Ht 1.581 m (5' 2.25\")   Wt 63 kg (139 lb)   SpO2 95%   BMI 25.22 kg/m   Estimated body mass index is 25.22 kg/m  as calculated from the following:    Height as of this encounter: 1.581 m (5' 2.25\").    Weight as of this encounter: 63 kg (139 lb).  EXAM:   GENERAL APPEARANCE: healthy, alert and no distress  EYES: Eyes grossly normal to inspection, PERRL and conjunctivae and sclerae normal  HENT: ear canals and TM's normal, nose and mouth without ulcers or lesions, oropharynx clear and oral mucous membranes " moist  NECK: no adenopathy, no asymmetry, masses, or scars and thyroid normal to palpation  RESP: lungs clear to auscultation - no rales, rhonchi or wheezes  CV: regular rate and rhythm, normal S1 S2, no S3 or S4, no murmur, click or rub, no peripheral edema and peripheral pulses strong  ABDOMEN: soft, nontender, no hepatosplenomegaly, no masses and bowel sounds normal  MS: no musculoskeletal defects are noted and gait is age appropriate without ataxia  SKIN: no suspicious lesions or rashes  NEURO: Normal strength and tone, sensory exam grossly normal, mentation intact and speech normal  PSYCH: mentation appears normal and affect normal/bright    Diagnostic Test Results:  EKG WITH INTERPRETATION/MGNNCW74/15/2020  [facility]   Component Name Value Ref Range   EKG     Comment:            [facility] - [address], [address] [place], [place] [address]                                         Test Date:    2020-10-15  Pat Name:     LUPE BERNAL         Department:   HVG  Patient ID:   [id number]                   Room:           Gender:       F                        Technician:   [id number]  :          1945               Requested By: RUPESH ZARAGOZA MD  Order Number: [id number]                Reading MD:   Rupesh Zaragoza MD                                   Measurements  Intervals                              Axis            Rate:         59                       P:              DC:           0                        QRS:          -48  QRSD:         141                      T:            35  QT:           424                                      QTc:          422                                                                 Interpretive Statements  Sinus bradycardia with 1st degree AV block   RBBB  LEFT ANTERIOR FASCICULAR BLOCK  Nonspecific ST/T changes   Compared to ECG 2020 09:28:55  No significant change  Electronically Signed On  10- 15:29:00 CDT by Vivian Balderas MD      MAMMO DIGITAL SCREENING BI5/22/2018  [facility]   Result Impression     #[id number] - MAMMO DIGITAL SCREENING BI  BILATERAL DIGITAL SCREENING MAMMOGRAM WITH CAD: 5/22/2018    COMPARISON: Comparison is made to exams dated:  5/10/2017 mammogram and 5/3/2016 mammogram - [facility].      FINDINGS: The tissue of both breasts is predominantly fatty.    Current study was also evaluated with a Computer Aided Detection (CAD) system.    No significant masses, calcifications, or other findings are seen in either breast.    There has been no significant interval change.    IMPRESSION: NEGATIVE  There is no mammographic evidence of malignancy. A 1 year screening mammogram is recommended.    The patient will be notified of the results by mail.        Wenceslao mitchell/cayden:5/22/2018 11:10:51       Echo3/8/2019  [facility]   Other Result Information   This result has an attachment that is not available.   Result Narrative   Interpretation Summary    * The left ventricle is normal in size and function. EF is 55-60%. Wall  motion is grossly normal.    * The right ventricle is normal in size and function.    * Mild mitral regurgitation.    * Mild to moderate tricuspid regurgitation.    * No pericardial effusion.    * No pulmonary hypertension, estimated right ventricular systolic pressure  is  28 mmHg.    * The inferior vena cava is normal in size (< 2.1 cm), > 50% respiratory  variance, RA pressure normal at 3 mmHg.    * Compared to study from 10/21/2010, there are no significant changes.         ASSESSMENT / PLAN:   1. Encounter for Medicare annual wellness exam  - CBC with platelets and differential    2. Screen for colon cancer  - Fecal colorectal cancer screen (FIT); Future    3. Chronic anticoagulation  - CBC with platelets and differential    4. Osteopenia of neck of femur, unspecified laterality  - Vitamin D  Deficiency    5. Hypertension goal BP (blood pressure) < 140/90  - Albumin Random Urine Quantitative with Creat Ratio  - atenolol (TENORMIN) 25 MG tablet; Take 0.5 tablets (12.5 mg) by mouth daily  Dispense: 45 tablet; Refill: 3  - lisinopril (ZESTRIL) 5 MG tablet; Take 1 tablet (5 mg) by mouth daily  Dispense: 90 tablet; Refill: 3    6. Mixed hyperlipidemia  - pravastatin (PRAVACHOL) 40 MG tablet; Take 1 tablet (40 mg) by mouth daily  Dispense: 90 tablet; Refill: 3  - Lipid panel reflex to direct LDL Non-fasting    7. Long-term (current) use of anticoagulants, INR goal 2.0-3.0  - warfarin ANTICOAGULANT (COUMADIN) 1 MG tablet; TAKE 2 TABLETS (2 MG) BY MOUTH ONCE DAILY ON MONDAYS, WEDNESDAYS, AND FRIDAYS. TAKE 1 TABLET (1MG) BY MOUTH ONCE DAILY ALL OTHER DAYS OF THE WEEK OR PER DIRECTIONS OFINR NURSE  Dispense: 120 tablet; Refill: 11    8. Chronic atrial fibrillation (H)  - CBC with platelets and differential  - warfarin ANTICOAGULANT (COUMADIN) 1 MG tablet; TAKE 2 TABLETS (2 MG) BY MOUTH ONCE DAILY ON MONDAYS, WEDNESDAYS, AND FRIDAYS. TAKE 1 TABLET (1MG) BY MOUTH ONCE DAILY ALL OTHER DAYS OF THE WEEK OR PER DIRECTIONS OFINR NURSE  Dispense: 120 tablet; Refill: 11    9. Gastroesophageal reflux disease without esophagitis  - famotidine (PEPCID) 20 MG tablet; Take 1 tablet (20 mg) by mouth 2 times daily  Dispense: 180 tablet; Refill: 3    Patient has been advised of split billing requirements and indicates understanding: Yes    COUNSELING:  Special attention given to:       Regular exercise       Healthy diet/nutrition       Hepatitis C screening       The 10-year ASCVD risk score (Onemolincoln GILL Jr., et al., 2013) is: 29.3%    Values used to calculate the score:      Age: 75 years      Sex: Female      Is Non- : No      Diabetic: No      Tobacco smoker: No      Systolic Blood Pressure: 154 mmHg      Is BP treated: Yes      HDL Cholesterol: 70 mg/dL      Total Cholesterol: 215 mg/dL    Estimated body  "mass index is 25.22 kg/m  as calculated from the following:    Height as of this encounter: 1.581 m (5' 2.25\").    Weight as of this encounter: 63 kg (139 lb).    Weight management plan: diet and exercise.    She reports that she has quit smoking. She has never used smokeless tobacco.    Appropriate preventive services were discussed with this patient, including applicable screening as appropriate for cardiovascular disease, diabetes, osteopenia/osteoporosis, and glaucoma.  As appropriate for age/gender, discussed screening for colorectal cancer, prostate cancer, breast cancer, and cervical cancer. Checklist reviewing preventive services available has been given to the patient.    Reviewed patients plan of care and provided an AVS. The Basic Care Plan (routine screening as documented in Health Maintenance) for Jenn meets the Care Plan requirement. This Care Plan has been established and reviewed with the Patient.    Counseling Resources:  ATP IV Guidelines  Pooled Cohorts Equation Calculator  Breast Cancer Risk Calculator  BRCA-Related Cancer Risk Assessment: FHS-7 Tool  FRAX Risk Assessment  ICSI Preventive Guidelines  Dietary Guidelines for Americans, 2010  USDA's MyPlate  ASA Prophylaxis  Lung CA Screening    Drake Lucas MD  [facility]  "

## 2020-12-03 NOTE — PATIENT INSTRUCTIONS
Patient Education   Personalized Prevention Plan  You are due for the preventive services outlined below.  Your care team is available to assist you in scheduling these services.  If you have already completed any of these items, please share that information with your care team to update in your medical record.  Health Maintenance Due   Topic Date Due     Mammogram  05/10/2019     Kidney Microalbumin Urine Test  09/17/2019     PHQ-2  01/01/2020     Annual Wellness Visit  10/08/2020     FALL RISK ASSESSMENT  10/08/2020     Colorectal Cancer Screening  10/31/2020     Cholesterol Lab  11/02/2020

## 2020-12-04 LAB — DEPRECATED CALCIDIOL+CALCIFEROL SERPL-MC: 70 UG/L (ref 20–75)

## 2021-01-05 ENCOUNTER — ANTICOAGULATION THERAPY VISIT (OUTPATIENT)
Dept: NURSING | Facility: CLINIC | Age: 76
End: 2021-01-05

## 2021-01-05 DIAGNOSIS — I48.91 ATRIAL FIBRILLATION, UNSPECIFIED TYPE (H): ICD-10-CM

## 2021-01-05 DIAGNOSIS — I48.20 CHRONIC ATRIAL FIBRILLATION (H): ICD-10-CM

## 2021-01-05 LAB
CAPILLARY BLOOD COLLECTION: NORMAL
INR PPP: 1.9 (ref 0.86–1.14)

## 2021-01-05 PROCEDURE — 85610 PROTHROMBIN TIME: CPT | Performed by: INTERNAL MEDICINE

## 2021-01-05 PROCEDURE — 36416 COLLJ CAPILLARY BLOOD SPEC: CPT | Performed by: INTERNAL MEDICINE

## 2021-01-05 NOTE — PROGRESS NOTES
ANTICOAGULATION MANAGEMENT     Patient Name:  Jenn Joseph  Date:  2021    ASSESSMENT /SUBJECTIVE:    Today's INR result of 1.90 is subtherapeutic. Goal INR of 2.0-3.0      Warfarin dose taken: Warfarin taken as instructed    Diet: Decreased greens/vitamin K in diet; ongoing change    Medication changes/ interactions: No new medications/supplements affecting INR    Previous INR: Therapeutic     S/S of bleeding or thromboembolism: No    New injury or illness: No    Upcoming surgery, procedure or cardioversion: No    Additional findings: None      PLAN:    Telephone call with Jenn regarding INR result and instructed:     Warfarin Dosing Instructions: Continue your current warfarin dose 2 mg M-W-; 1 mg all other days    Instructed patient to follow up no later than: 2 weeks  Lab visit scheduled    Education provided: None required      Jenn verbalizes understanding and agrees to warfarin dosing plan.    Instructed to call the Anticoagulation Clinic for any changes, questions or concerns. (#[phone])        Kim Zhang, RN      OBJECTIVE:  Recent labs: (last 7 days)     21  0957   INR 1.90*         INR assessment SUB    Recheck INR In: 2 WEEKS    INR Location Clinic      Anticoagulation Summary  As of 2021    INR goal:  2.0-3.0   TTR:  94.3 % (1 y)   INR used for dosin.90 (2021)   Warfarin maintenance plan:  2 mg (1 mg x 2) every Mon, Wed, Fri; 1 mg (1 mg x 1) all other days   Full warfarin instructions:  2 mg every Mon, Wed, Fri; 1 mg all other days   Weekly warfarin total:  10 mg   No change documented:  Kim Zhang RN   Plan last modified:  Manjula Hamm RN (2018)   Next INR check:  2021   Priority:  Maintenance   Target end date:  3/24/2021    Indications    Long-term (current) use of anticoagulants [Z79.01] (Resolved) [Z79.01]  Atrial fibrillation/flutter (Resolved)  Atrial fibrillation  unspecified type (H) [I48.91]              Anticoagulation Episode Summary     INR check location:      Preferred lab:      Send INR reminders to:  ELIZABETH CROUCH    Comments:  every 6 weeks      Anticoagulation Care Providers     Provider Role Specialty Phone number    Drake Lucas MD Referring Internal Medicine [phone]

## 2021-01-19 ENCOUNTER — ANTICOAGULATION THERAPY VISIT (OUTPATIENT)
Dept: NURSING | Facility: CLINIC | Age: 76
End: 2021-01-19

## 2021-01-19 ENCOUNTER — OFFICE VISIT (OUTPATIENT)
Dept: LAB | Facility: CLINIC | Age: 76
End: 2021-01-19
Payer: COMMERCIAL

## 2021-01-19 DIAGNOSIS — I48.20 CHRONIC ATRIAL FIBRILLATION (H): ICD-10-CM

## 2021-01-19 DIAGNOSIS — I48.91 ATRIAL FIBRILLATION, UNSPECIFIED TYPE (H): ICD-10-CM

## 2021-01-19 LAB
CAPILLARY BLOOD COLLECTION: NORMAL
INR PPP: 1.8 (ref 0.86–1.14)

## 2021-01-19 PROCEDURE — 85610 PROTHROMBIN TIME: CPT | Performed by: INTERNAL MEDICINE

## 2021-01-19 PROCEDURE — 36416 COLLJ CAPILLARY BLOOD SPEC: CPT | Performed by: INTERNAL MEDICINE

## 2021-01-19 NOTE — PROGRESS NOTES
ANTICOAGULATION MANAGEMENT     Patient Name:  Jenn Joseph  Date:  2021    ASSESSMENT /SUBJECTIVE:    Today's INR result of 1.80 is subtherapeutic. Goal INR of 2.0-3.0      Warfarin dose taken: Warfarin taken as instructed    Diet: Increased greens/vitamin K in diet; ongoing change    Medication changes/ interactions: No new medications/supplements affecting INR    Previous INR: Subtherapeutic     S/S of bleeding or thromboembolism: No    New injury or illness: No    Upcoming surgery, procedure or cardioversion: No    Additional findings: None      PLAN:    Telephone call with Jenn regarding INR result and instructed:     Warfarin Dosing Instructions: Change your warfarin dose to 1 mg Sun, Tue, Thu; 2 mg all other days  . (10 % change)    Instructed patient to follow up no later than: 2 weeks  Lab visit scheduled    Education provided: None required      Jenn verbalizes understanding and agrees to warfarin dosing plan.    Instructed to call the Anticoagulation Clinic for any changes, questions or concerns. (#[phone])        Kim Zhang RN      OBJECTIVE:  Recent labs: (last 7 days)     21  1001   INR 1.80*         INR assessment SUB    Recheck INR In: 2 WEEKS    INR Location Clinic      Anticoagulation Summary  As of 2021    INR goal:  2.0-3.0   TTR:  90.4 % (1 y)   INR used for dosin.80 (2021)   Warfarin maintenance plan:  1 mg (1 mg x 1) every Sun, Tue, Thu; 2 mg (1 mg x 2) all other days   Full warfarin instructions:  1 mg every Sun, Tue, Thu; 2 mg all other days   Weekly warfarin total:  11 mg   Plan last modified:  Kim Zhang RN (2021)   Next INR check:  2021   Priority:  Maintenance   Target end date:  3/24/2021    Indications    Long-term (current) use of anticoagulants [Z79.01] (Resolved) [Z79.01]  Atrial fibrillation/flutter (Resolved)  Atrial fibrillation  unspecified type (H) [I48.91]             Anticoagulation Episode Summary      INR check location:      Preferred lab:      Send INR reminders to:  ELIZABETH CROUCH    Comments:  every 6 weeks      Anticoagulation Care Providers     Provider Role Specialty Phone number    Drake Lucas MD Referring Internal Medicine [phone]

## 2021-01-19 NOTE — PROGRESS NOTES
Anticoagulation Management    Unable to reach Jenn today.    Today's INR result of 1.80 is subtherapeutic (goal INR of 2.0-3.0).  Result received from: Clinic Lab    Follow up required to confirm warfarin dose taken and assess for changes    [facility]      Anticoagulation clinic to follow up    Kim Zhang RN  [phone]

## 2021-02-02 ENCOUNTER — ANTICOAGULATION THERAPY VISIT (OUTPATIENT)
Dept: NURSING | Facility: CLINIC | Age: 76
End: 2021-02-02

## 2021-02-02 DIAGNOSIS — I48.20 CHRONIC ATRIAL FIBRILLATION (H): ICD-10-CM

## 2021-02-02 DIAGNOSIS — I48.91 ATRIAL FIBRILLATION, UNSPECIFIED TYPE (H): ICD-10-CM

## 2021-02-02 LAB
CAPILLARY BLOOD COLLECTION: NORMAL
INR PPP: 2.9 (ref 0.86–1.14)

## 2021-02-02 PROCEDURE — 36416 COLLJ CAPILLARY BLOOD SPEC: CPT | Performed by: INTERNAL MEDICINE

## 2021-02-02 PROCEDURE — 85610 PROTHROMBIN TIME: CPT | Performed by: INTERNAL MEDICINE

## 2021-02-02 NOTE — PROGRESS NOTES
ANTICOAGULATION MANAGEMENT     Patient Name:  Jenn Joseph  Date:  2021    ASSESSMENT /SUBJECTIVE:    Today's INR result of 2.90 is therapeutic. Goal INR of 2.0-3.0      Warfarin dose taken: Warfarin taken as instructed    Diet: No new diet changes affecting INR    Medication changes/ interactions: No new medications/supplements affecting INR    Previous INR: Subtherapeutic     S/S of bleeding or thromboembolism: No    New injury or illness: No    Upcoming surgery, procedure or cardioversion: No    Additional findings: Covid Vaccine tomorrow      PLAN:    Telephone call with Jenn regarding INR result and instructed:     Warfarin Dosing Instructions: Continue your current warfarin dose 1 mg Sun, Tue, Thu; 2 mg all other days    Instructed patient to follow up no later than: 2 weeks  Lab visit scheduled    Education provided: None required      Jenn verbalizes understanding and agrees to warfarin dosing plan.    Instructed to call the Anticoagulation Clinic for any changes, questions or concerns. (#[phone])        Kim Zhang RN      OBJECTIVE:  Recent labs: (last 7 days)     21  1009   INR 2.90*         INR assessment THER    Recheck INR In: 2 WEEKS    INR Location Clinic      Anticoagulation Summary  As of 2021    INR goal:  2.0-3.0   TTR:  89.7 % (1 y)   INR used for dosin.90 (2021)   Warfarin maintenance plan:  1 mg (1 mg x 1) every Sun, Tue, Thu; 2 mg (1 mg x 2) all other days   Full warfarin instructions:  1 mg every Sun, Tue, Thu; 2 mg all other days   Weekly warfarin total:  11 mg   No change documented:  Kim Zhang RN   Plan last modified:  Kim Zhang RN (2021)   Next INR check:  2021   Priority:  Maintenance   Target end date:  3/24/2021    Indications    Long-term (current) use of anticoagulants [Z79.01] (Resolved) [Z79.01]  Atrial fibrillation/flutter (Resolved)  Atrial fibrillation  unspecified type (H) [I48.91]              Anticoagulation Episode Summary     INR check location:      Preferred lab:      Send INR reminders to:  ELIZABETH CROUCH    Comments:  every 6 weeks      Anticoagulation Care Providers     Provider Role Specialty Phone number    Drake Lucas MD Referring Internal Medicine [phone]

## 2021-02-03 ENCOUNTER — IMMUNIZATION (OUTPATIENT)
Dept: PEDIATRICS | Facility: CLINIC | Age: 76
End: 2021-02-03
Payer: COMMERCIAL

## 2021-02-03 PROCEDURE — 91300 PR COVID VAC PFIZER DIL RECON 30 MCG/0.3 ML IM: CPT

## 2021-02-03 PROCEDURE — 0001A PR COVID VAC PFIZER DIL RECON 30 MCG/0.3 ML IM: CPT

## 2021-02-16 ENCOUNTER — ANTICOAGULATION THERAPY VISIT (OUTPATIENT)
Dept: NURSING | Facility: CLINIC | Age: 76
End: 2021-02-16

## 2021-02-16 ENCOUNTER — TELEPHONE (OUTPATIENT)
Dept: FAMILY MEDICINE | Facility: CLINIC | Age: 76
End: 2021-02-16

## 2021-02-16 DIAGNOSIS — I48.20 CHRONIC ATRIAL FIBRILLATION (H): ICD-10-CM

## 2021-02-16 DIAGNOSIS — I48.91 ATRIAL FIBRILLATION, UNSPECIFIED TYPE (H): ICD-10-CM

## 2021-02-16 DIAGNOSIS — I48.20 CHRONIC ATRIAL FIBRILLATION (H): Primary | ICD-10-CM

## 2021-02-16 LAB
CAPILLARY BLOOD COLLECTION: NORMAL
INR PPP: 3.1 (ref 0.86–1.14)

## 2021-02-16 PROCEDURE — 36416 COLLJ CAPILLARY BLOOD SPEC: CPT | Performed by: INTERNAL MEDICINE

## 2021-02-16 PROCEDURE — 85610 PROTHROMBIN TIME: CPT | Performed by: INTERNAL MEDICINE

## 2021-02-16 NOTE — TELEPHONE ENCOUNTER
ANTICOAGULATION MANAGEMENT      Jenn Joseph due for annual renewal of referral to anticoagulation monitoring. Order pended for your review and signature.      ANTICOAGULATION SUMMARY      Warfarin indication(s)     Atrial fibrillation    Heart valve present?  NO       Current goal range   INR: 2.0-3.0     Goal appropriate for indication? Yes, INR 2-3 appropriate for hx of DVT, PE, hypercoagulable state, Afib, LVAD, or bileaflet AVR without risk factors     Current duration of therapy Indefinite/long term therapy   Time in Therapeutic Range (TTR)  (Goal > 60%) 89.7 %       Office visit with referring provider's group within last year yes on 12/03/2020       Kim Zhang RN

## 2021-02-16 NOTE — PROGRESS NOTES
ANTICOAGULATION MANAGEMENT     Patient Name:  Jenn Joseph  Date:  2/16/2021    ASSESSMENT /SUBJECTIVE:    Today's INR result of 3.10 is supratherapeutic. Goal INR of 2.0-3.0      Warfarin dose taken: Warfarin taken as instructed    Diet: Decreased greens/vitamin K in diet; ongoing change    Medication changes/ interactions: No new medications/supplements affecting INR    Previous INR: Therapeutic     S/S of bleeding or thromboembolism: No    New injury or illness: No    Upcoming surgery, procedure or cardioversion: No    Additional findings: None      PLAN:    Telephone call with Jenn regarding INR result and instructed:     Warfarin Dosing Instructions: Change your warfarin dose to 2 mg Mon, Wed, Fri; 1 mg all other days  . (10 % change)    Instructed patient to follow up no later than: 2 weeks  Lab visit scheduled    Education provided: None required      Jenn verbalizes understanding and agrees to warfarin dosing plan.    Instructed to call the Anticoagulation Clinic for any changes, questions or concerns. (#[phone])        Kim Zhang RN      OBJECTIVE:  Recent labs: (last 7 days)     02/16/21  1032   INR 3.10*         INR assessment SUPRA    Recheck INR In: 2 WEEKS    INR Location Clinic      Anticoagulation Summary  As of 2/16/2021    INR goal:  2.0-3.0   TTR:  87.8 % (1 y)   INR used for dosing:  3.10 (2/16/2021)   Warfarin maintenance plan:  2 mg (1 mg x 2) every Mon, Wed, Fri; 1 mg (1 mg x 1) all other days   Full warfarin instructions:  2 mg every Mon, Wed, Fri; 1 mg all other days   Weekly warfarin total:  10 mg   Plan last modified:  Kim Zhang RN (2/16/2021)   Next INR check:  3/2/2021   Priority:  Maintenance   Target end date:  3/24/2021    Indications    Long-term (current) use of anticoagulants [Z79.01] (Resolved) [Z79.01]  Atrial fibrillation/flutter (Resolved)  Atrial fibrillation  unspecified type (H) [I48.91]             Anticoagulation Episode Summary      INR check location:      Preferred lab:      Send INR reminders to:  ELIZABETH CROUCH    Comments:  every 6 weeks      Anticoagulation Care Providers     Provider Role Specialty Phone number    Drake Lucas MD Referring Internal Medicine [phone]

## 2021-02-17 ENCOUNTER — DOCUMENTATION ONLY (OUTPATIENT)
Dept: FAMILY MEDICINE | Facility: CLINIC | Age: 76
End: 2021-02-17

## 2021-02-17 DIAGNOSIS — I48.20 CHRONIC ATRIAL FIBRILLATION (H): Primary | ICD-10-CM

## 2021-02-17 DIAGNOSIS — Z79.01 LONG-TERM (CURRENT) USE OF ANTICOAGULANTS, INR GOAL 2.0-3.0: ICD-10-CM

## 2021-02-24 ENCOUNTER — IMMUNIZATION (OUTPATIENT)
Dept: PEDIATRICS | Facility: CLINIC | Age: 76
End: 2021-02-24
Attending: INTERNAL MEDICINE
Payer: COMMERCIAL

## 2021-02-24 PROCEDURE — 91300 PR COVID VAC PFIZER DIL RECON 30 MCG/0.3 ML IM: CPT

## 2021-02-24 PROCEDURE — 0002A PR COVID VAC PFIZER DIL RECON 30 MCG/0.3 ML IM: CPT

## 2021-03-02 ENCOUNTER — ANTICOAGULATION THERAPY VISIT (OUTPATIENT)
Dept: NURSING | Facility: CLINIC | Age: 76
End: 2021-03-02

## 2021-03-02 DIAGNOSIS — I48.20 CHRONIC ATRIAL FIBRILLATION (H): ICD-10-CM

## 2021-03-02 DIAGNOSIS — Z79.01 LONG-TERM (CURRENT) USE OF ANTICOAGULANTS, INR GOAL 2.0-3.0: ICD-10-CM

## 2021-03-02 DIAGNOSIS — I48.91 ATRIAL FIBRILLATION, UNSPECIFIED TYPE (H): ICD-10-CM

## 2021-03-02 LAB
CAPILLARY BLOOD COLLECTION: NORMAL
INR PPP: 2.6 (ref 0.86–1.14)

## 2021-03-02 PROCEDURE — 85610 PROTHROMBIN TIME: CPT | Performed by: INTERNAL MEDICINE

## 2021-03-02 PROCEDURE — 36416 COLLJ CAPILLARY BLOOD SPEC: CPT | Performed by: INTERNAL MEDICINE

## 2021-03-02 NOTE — PROGRESS NOTES
ANTICOAGULATION MANAGEMENT     Patient Name:  Jenn Joseph  Date:  3/2/2021    ASSESSMENT /SUBJECTIVE:    Today's INR result of 2.60 is therapeutic. Goal INR of 2.0-3.0      Warfarin dose taken: Warfarin taken as instructed    Diet: No new diet changes affecting INR    Medication changes/ interactions: No new medications/supplements affecting INR    Previous INR: Supratherapeutic     S/S of bleeding or thromboembolism: No    New injury or illness: No    Upcoming surgery, procedure or cardioversion: No    Additional findings: None      PLAN:    Telephone call with Jenn regarding INR result and instructed:     Warfarin Dosing Instructions: Continue your current warfarin dose 2 mg Mon, Wed, Fri; 1 mg all other days    Instructed patient to follow up no later than: 3 weeks  Lab visit scheduled    Education provided: None required      Jenn verbalizes understanding and agrees to warfarin dosing plan.    Instructed to call the Anticoagulation Clinic for any changes, questions or concerns. (#[phone])        Kim Zhang RN      OBJECTIVE:  Recent labs: (last 7 days)     21  0955   INR 2.60*         INR assessment THER    Recheck INR In: 3 WEEKS    INR Location Clinic      Anticoagulation Summary  As of 3/2/2021    INR goal:  2.0-3.0   TTR:  87.0 % (1 y)   INR used for dosin.60 (3/2/2021)   Warfarin maintenance plan:  2 mg (1 mg x 2) every Mon, Wed, Fri; 1 mg (1 mg x 1) all other days   Full warfarin instructions:  2 mg every Mon, Wed, Fri; 1 mg all other days   Weekly warfarin total:  10 mg   No change documented:  Kim Zhang RN   Plan last modified:  Kim Zhang RN (2021)   Next INR check:  3/23/2021   Priority:  Maintenance   Target end date:  Indefinite    Indications    Long-term (current) use of anticoagulants [Z79.01] (Resolved) [Z79.01]  Atrial fibrillation/flutter (Resolved)  Atrial fibrillation  unspecified type (H) [I48.91]  Chronic atrial  fibrillation (H) [I48.20]             Anticoagulation Episode Summary     INR check location:      Preferred lab:      Send INR reminders to:  ELIZABETH CROUCH    Comments:  every 6 weeks  Referral renewed 2/17/21      Anticoagulation Care Providers     Provider Role Specialty Phone number    Drake Lucas MD Referring Internal Medicine [phone]

## 2021-03-23 ENCOUNTER — ANTICOAGULATION THERAPY VISIT (OUTPATIENT)
Dept: NURSING | Facility: CLINIC | Age: 76
End: 2021-03-23

## 2021-03-23 DIAGNOSIS — Z79.01 LONG-TERM (CURRENT) USE OF ANTICOAGULANTS, INR GOAL 2.0-3.0: ICD-10-CM

## 2021-03-23 DIAGNOSIS — I48.20 CHRONIC ATRIAL FIBRILLATION (H): ICD-10-CM

## 2021-03-23 DIAGNOSIS — I48.91 ATRIAL FIBRILLATION, UNSPECIFIED TYPE (H): ICD-10-CM

## 2021-03-23 LAB
CAPILLARY BLOOD COLLECTION: NORMAL
INR PPP: 1.9 (ref 0.86–1.14)

## 2021-03-23 PROCEDURE — 36416 COLLJ CAPILLARY BLOOD SPEC: CPT | Performed by: INTERNAL MEDICINE

## 2021-03-23 PROCEDURE — 85610 PROTHROMBIN TIME: CPT | Performed by: INTERNAL MEDICINE

## 2021-03-23 NOTE — PROGRESS NOTES
Anticoagulation Management    Unable to reach Jenn today.    Today's INR result of 1.90 is subtherapeutic (goal INR of 2.0-3.0).  Result received from: Clinic Lab    Follow up required to confirm warfarin dose taken and assess for changes    [facility]      Anticoagulation clinic to follow up    Kim Zhang RN  [phone]

## 2021-03-23 NOTE — PROGRESS NOTES
ANTICOAGULATION MANAGEMENT     Patient Name:  Jenn Joseph  Date:  3/23/2021    ASSESSMENT /SUBJECTIVE:    Today's INR result of 1.90 is subtherapeutic. Goal INR of 2.0-3.0      Warfarin dose taken: Warfarin taken as instructed    Diet: Increased greens/vitamin K in diet; plans to resume previous intake    Medication changes/ interactions: No new medications/supplements affecting INR    Previous INR: Therapeutic     S/S of bleeding or thromboembolism: No    New injury or illness: No    Upcoming surgery, procedure or cardioversion: No    Additional findings: None      PLAN:    Telephone call with Jenn regarding INR result and instructed:     Warfarin Dosing Instructions: Continue your current warfarin dose 2 mg M--; 1 mg all other days    Instructed patient to follow up no later than: 2 weeks  Lab visit scheduled    Education provided: None required      Jenn verbalizes understanding and agrees to warfarin dosing plan.    Instructed to call the Anticoagulation Clinic for any changes, questions or concerns. (#[phone])        Kim Zhang RN      OBJECTIVE:  Recent labs: (last 7 days)     21  1021   INR 1.90*         INR assessment SUB    Recheck INR In: 2 WEEKS    INR Location Clinic      Anticoagulation Summary  As of 3/23/2021    INR goal:  2.0-3.0   TTR:  86.2 % (1 y)   INR used for dosin.90 (3/23/2021)   Warfarin maintenance plan:  2 mg (1 mg x 2) every Mon, Wed, Fri; 1 mg (1 mg x 1) all other days   Full warfarin instructions:  2 mg every Mon, Wed, Fri; 1 mg all other days   Weekly warfarin total:  10 mg   No change documented:  Kim Zhang RN   Plan last modified:  Kim Zhang RN (2021)   Next INR check:  2021   Priority:  Maintenance   Target end date:  Indefinite    Indications    Long-term (current) use of anticoagulants [Z79.01] (Resolved) [Z79.01]  Atrial fibrillation/flutter (Resolved)  Atrial fibrillation  unspecified type (H)  [I48.91]  Chronic atrial fibrillation (H) [I48.20]             Anticoagulation Episode Summary     INR check location:      Preferred lab:      Send INR reminders to:  ELIZABETH CROUCH    Comments:  every 6 weeks  Referral renewed 2/17/21      Anticoagulation Care Providers     Provider Role Specialty Phone number    Drake Lucas MD Referring Internal Medicine [phone]

## 2021-04-06 ENCOUNTER — ANTICOAGULATION THERAPY VISIT (OUTPATIENT)
Dept: NURSING | Facility: CLINIC | Age: 76
End: 2021-04-06

## 2021-04-06 DIAGNOSIS — I48.91 ATRIAL FIBRILLATION, UNSPECIFIED TYPE (H): ICD-10-CM

## 2021-04-06 DIAGNOSIS — Z79.01 LONG-TERM (CURRENT) USE OF ANTICOAGULANTS, INR GOAL 2.0-3.0: ICD-10-CM

## 2021-04-06 DIAGNOSIS — I48.20 CHRONIC ATRIAL FIBRILLATION (H): ICD-10-CM

## 2021-04-06 LAB
CAPILLARY BLOOD COLLECTION: NORMAL
INR PPP: 2.5 (ref 0.86–1.14)

## 2021-04-06 PROCEDURE — 85610 PROTHROMBIN TIME: CPT | Performed by: INTERNAL MEDICINE

## 2021-04-06 PROCEDURE — 36416 COLLJ CAPILLARY BLOOD SPEC: CPT | Performed by: INTERNAL MEDICINE

## 2021-04-06 NOTE — PROGRESS NOTES
ANTICOAGULATION MANAGEMENT     Patient Name:  Jenn Joseph  Date:  2021    ASSESSMENT /SUBJECTIVE:    Today's INR result of 2.5 is therapeutic. Goal INR of 2.0-3.0      Warfarin dose taken: Warfarin taken as instructed    Diet: No new diet changes affecting INR    Medication changes/ interactions: No new medications/supplements affecting INR    Previous INR: Subtherapeutic     S/S of bleeding or thromboembolism: No    New injury or illness: No    Upcoming surgery, procedure or cardioversion: No    Additional findings: None      PLAN:    Telephone call with Jenn regarding INR result and instructed:     Warfarin Dosing Instructions: Continue your current warfarin dose 2mg every Mon, Wed, Fri; 1mg all other days    Instructed patient to follow up no later than: 2 weeks  Lab visit scheduled    Education provided: Target INR goal and significance of current INR result      Jenn verbalizes understanding and agrees to warfarin dosing plan.    Instructed to call the Anticoagulation Clinic for any changes, questions or concerns. (#[phone])        Brian Valerio RN      OBJECTIVE:  Recent labs: (last 7 days)     21  1319   INR 2.50*         No question data found.  Anticoagulation Summary  As of 2021    INR goal:  2.0-3.0   TTR:  85.6 % (1 y)   INR used for dosin.50 (2021)   Warfarin maintenance plan:  2 mg (1 mg x 2) every Mon, Wed, Fri; 1 mg (1 mg x 1) all other days   Full warfarin instructions:  2 mg every Mon, Wed, Fri; 1 mg all other days   Weekly warfarin total:  10 mg   No change documented:  Kim Zhang RN   Plan last modified:  Kim Zhang RN (2021)   Next INR check:  2021   Priority:  Maintenance   Target end date:  Indefinite    Indications    Long-term (current) use of anticoagulants [Z79.01] (Resolved) [Z79.01]  Atrial fibrillation/flutter (Resolved)  Atrial fibrillation  unspecified type (H) [I48.91]  Chronic atrial fibrillation (H)  [I48.20]             Anticoagulation Episode Summary     INR check location:      Preferred lab:      Send INR reminders to:  ELIZABETH CROUCH    Comments:  every 6 weeks  Referral renewed 2/17/21      Anticoagulation Care Providers     Provider Role Specialty Phone number    Drake Lucas MD Referring Internal Medicine [phone]

## 2021-04-06 NOTE — PROGRESS NOTES
Anticoagulation Management    Unable to reach Jenn today.    Today's INR result of 2.5 is therapeutic (goal INR of 2.0-3.0).  Result received from: Clinic Lab    Follow up required to confirm warfarin dose taken and assess for changes    [facility]      Anticoagulation clinic to follow up    Kim Zhang, RN  [phone]

## 2021-04-20 ENCOUNTER — ANTICOAGULATION THERAPY VISIT (OUTPATIENT)
Dept: NURSING | Facility: CLINIC | Age: 76
End: 2021-04-20

## 2021-04-20 DIAGNOSIS — I48.20 CHRONIC ATRIAL FIBRILLATION (H): ICD-10-CM

## 2021-04-20 DIAGNOSIS — I48.91 ATRIAL FIBRILLATION, UNSPECIFIED TYPE (H): ICD-10-CM

## 2021-04-20 DIAGNOSIS — Z79.01 LONG-TERM (CURRENT) USE OF ANTICOAGULANTS, INR GOAL 2.0-3.0: ICD-10-CM

## 2021-04-20 LAB
CAPILLARY BLOOD COLLECTION: NORMAL
INR PPP: 2.6 (ref 0.86–1.14)

## 2021-04-20 PROCEDURE — 85610 PROTHROMBIN TIME: CPT | Performed by: INTERNAL MEDICINE

## 2021-04-20 PROCEDURE — 36416 COLLJ CAPILLARY BLOOD SPEC: CPT | Performed by: INTERNAL MEDICINE

## 2021-04-20 NOTE — PROGRESS NOTES
ANTICOAGULATION MANAGEMENT     Patient Name:  Jenn Joseph  Date:  2021    ASSESSMENT /SUBJECTIVE:    Today's INR result of 2.60 is therapeutic. Goal INR of 2.0-3.0      Warfarin dose taken: Warfarin taken as instructed    Diet: No new diet changes affecting INR    Medication changes/ interactions: No new medications/supplements affecting INR    Previous INR: Therapeutic     S/S of bleeding or thromboembolism: No    New injury or illness: No    Upcoming surgery, procedure or cardioversion: No    Additional findings: None      PLAN:    Telephone call with Jenn regarding INR result and instructed:     Warfarin Dosing Instructions: Continue your current warfarin dose 2 mg --; 1 mg all other days    Instructed patient to follow up no later than: 5 weeks  Lab visit scheduled    Education provided: None required      Jenn verbalizes understanding and agrees to warfarin dosing plan.    Instructed to call the Anticoagulation Clinic for any changes, questions or concerns. (#[phone])        Kim Zhang RN      OBJECTIVE:  Recent labs: (last 7 days)     21  1027   INR 2.60*         INR assessment THER    Recheck INR In: 5 WEEKS    INR Location Clinic      Anticoagulation Summary  As of 2021    INR goal:  2.0-3.0   TTR:  85.6 % (1 y)   INR used for dosin.60 (2021)   Warfarin maintenance plan:  2 mg (1 mg x 2) every Mon, Wed, Fri; 1 mg (1 mg x 1) all other days   Full warfarin instructions:  2 mg every Mon, Wed, Fri; 1 mg all other days   Weekly warfarin total:  10 mg   No change documented:  Kim Zhang RN   Plan last modified:  Kim Zhang RN (2021)   Next INR check:  2021   Priority:  Maintenance   Target end date:  Indefinite    Indications    Long-term (current) use of anticoagulants [Z79.01] (Resolved) [Z79.01]  Atrial fibrillation/flutter (Resolved)  Atrial fibrillation  unspecified type (H) [I48.91]  Chronic atrial fibrillation (H)  [I48.20]             Anticoagulation Episode Summary     INR check location:      Preferred lab:      Send INR reminders to:  ELIZABETH CROUCH    Comments:  every 6 weeks  Referral renewed 2/17/21      Anticoagulation Care Providers     Provider Role Specialty Phone number    Drake Lucas MD Referring Internal Medicine [phone]

## 2021-05-17 ENCOUNTER — TELEPHONE (OUTPATIENT)
Dept: FAMILY MEDICINE | Facility: CLINIC | Age: 76
End: 2021-05-17

## 2021-05-17 NOTE — TELEPHONE ENCOUNTER
Patient Quality Outreach      Summary:    Patient has the following on her problem list/HM:   Hypertension   Last three blood pressure readings:  BP Readings from Last 3 Encounters:   12/03/20 (!) 154/81   10/08/19 121/71   08/22/19 117/71     Blood pressure: Failed    HTN Guidelines:  ? 139/89     Patient is due/failing the following:   BP check    Type of outreach:        Questions for provider review:    None                                                                                                                                          Chart routed to Care Team.

## 2021-05-20 ENCOUNTER — OFFICE VISIT (OUTPATIENT)
Dept: FAMILY MEDICINE | Facility: CLINIC | Age: 76
End: 2021-05-20
Payer: COMMERCIAL

## 2021-05-20 VITALS
TEMPERATURE: 97.5 F | DIASTOLIC BLOOD PRESSURE: 76 MMHG | WEIGHT: 137 LBS | RESPIRATION RATE: 16 BRPM | HEART RATE: 68 BPM | BODY MASS INDEX: 24.86 KG/M2 | SYSTOLIC BLOOD PRESSURE: 152 MMHG | OXYGEN SATURATION: 97 %

## 2021-05-20 DIAGNOSIS — S93.602A FOOT SPRAIN, LEFT, INITIAL ENCOUNTER: Primary | ICD-10-CM

## 2021-05-20 PROCEDURE — 99212 OFFICE O/P EST SF 10 MIN: CPT | Performed by: INTERNAL MEDICINE

## 2021-05-20 ASSESSMENT — PAIN SCALES - GENERAL: PAINLEVEL: SEVERE PAIN (7)

## 2021-05-20 NOTE — PATIENT INSTRUCTIONS
At [facility], we strive to deliver an exceptional experience to you, every time we see you. If you receive a survey, please complete it as we do value your feedback.  If you have MyChart, you can expect to receive results automatically within 24 hours of their completion.  Your provider will send a note interpreting your results as well.   If you do not have MyChart, you should receive your results in about a week by mail.    Your care team:                            Family Medicine Internal Medicine   MD Drake Denise MD Shantel Branch-Fleming, MD Srinivasa Vaka, MD Katya Belousova, PAIVANNA Law, APRN CNP    Reji Musa, MD Pediatrics   Sunny Jin, PAIVANNA Echeverria, CNP MD Marcela Juarez APRN CNP   MD Anupama West MD Deborah Mielke, MD Mirtha Barney, APRN [facility]      Clinic hours: Monday - Thursday 7 am-6 pm; Fridays 7 am-5 pm.   Urgent care: Monday - Friday 10 am- 8 pm; Saturday and Sunday 9 am-5 pm.    Clinic: [phone]       [facility]: Monday - Thursday 8 am - 7 pm; Friday 8 am - 6 pm  [facility]: [phone]     Use www.oncare.org for 24/7 diagnosis and treatment of dozens of conditions.    Patient Education     Foot Sprain    A sprain is a stretching or tearing of the ligaments that hold a joint together. There are usually no broken bones. Sprains generally take from 3 to 6 weeks to heal. A sprain may be treated with a splint, walking cast, or special boot. Mild sprains may not need any additional support.  Home care  The following guidelines will help you care for your injury at home:    Keep your leg elevated when sitting or lying down. This is very important during the first 48 hours to reduce swelling. Stay off the injured foot as much as possible until you can walk on it without pain. If needed, you may use crutches during the first week  for this purpose. Crutches can be rented at many pharmacies or surgical/orthopedic supply stores.    You may be given a cast shoe to wear to prevent movement in your foot. If not, you can use a sandal or any shoe that does not put pressure on the injured area until the swelling and pain go away. If using a sandal, be careful not to hit your foot against anything, since another injury could make the sprain worse.    Apply an ice pack over the injured area for 15 to 20 minutes every 3 to 6 hours. You should do this for the first 24 to 48 hours. You can make an ice pack by filling a plastic bag that seals at the top with ice cubes and then wrapping it with a thin towel. Continue to use ice packs for relief of pain and swelling as needed. As the ice melts, try not to get the wrap, splint, or cast wet. After 48 hours, apply heat from a warm shower or bath for 20 minutes several times daily. Alternating ice and heat may also be helpful.    You may use over-the-counter pain medicine to control pain, unless another medicine was prescribed. If you have chronic liver or kidney disease or ever had a stomach ulcer or gastrointestinal bleeding, talk with your healthcare provider before using these medicines.    If you were given a splint or cast, keep it dry. Bathe with your splint or cast well out of the water, protected with 2 large plastic bags, sealed with tape or rubber-bands at the top end. If a fiberglass splint or cast gets wet, you can dry it with a hair dryer on cool setting.    You may return to sports after healing, when you can run without pain.  Follow-up care  Follow up with your healthcare provider as directed. Sometimes fractures don t show up on the first X-ray. Bruises and sprains can sometimes hurt as much as a fracture. These injuries can take time to heal completely. If your symptoms don t improve or they get worse, talk with your healthcare provider. You may need a repeat X-ray or other tests.  When to  seek medical advice  Call your healthcare provider right away if any of these occur:    The plaster cast or splint gets wet or soft    The fiberglass cast or splint gets wet and does not dry for 24 hours    Pain or swelling increases, or redness appears    A bad odor comes from within the cast    Fever of 100.4 F (38 C) or above lasting for 24 to 48 hours, or as advised    Chills    Toes on the injured foot become cold, blue, numb, or tingly  Luzma last reviewed this educational content on 5/1/2018 2000-2021 [employer]. All rights reserved. This information is not intended as a substitute for professional medical care. Always follow your healthcare professional's instructions.

## 2021-05-20 NOTE — PROGRESS NOTES
Assessment & Plan     Foot sprain, left, initial encounter  Sprained her foot around a week ago  Her foot got twisted  Nothing fell on it  No complaining of some pain with some movements  Minimal swelling  Able to bear weight  On examination there is minimal swelling on the dorsal aspect of left foot towards the first second and third metatarsal areas  Pain on some internal and external rotation  Reassured  Try Ace wrap  And NSAIDs cannot be taken by her because she is on Coumadin  If things do not improve in a week I would consider orthopedic referral        15 minutes spent on the date of the encounter doing chart review, history and exam, documentation and further activities per the note           Return in about 1 week (around 5/27/2021).    Pepito Green MD  [facility]    Colt Barajas is a 76 year old who presents for the following health issues     HPI     Musculoskeletal problem/pain  Onset/Duration: 5/13/21  Description  Location: foot - left  Joint Swelling: YES  Redness: YES- some discoloration   Pain: YES  Warmth: no  Intensity:  7/10  Progression of Symptoms:  intermittent  Accompanying signs and symptoms:   Fevers: no  Numbness/tingling/weakness: no  History  Trauma to the area: YES- patient fell   Recent illness:  no  Previous similar problem: no  Previous evaluation:  no  Precipitating or alleviating factors:  Aggravating factors include: shoes   Therapies tried and outcome: heat, ice and acetaminophen        Review of Systems         Objective    BP (!) 152/76 (BP Location: Right arm, Patient Position: Chair, Cuff Size: Adult Regular)   Pulse 68   Temp 97.5  F (36.4  C) (Tympanic)   Resp 16   Wt 62.1 kg (137 lb)   SpO2 97%   BMI 24.86 kg/m    Body mass index is 24.86 kg/m .  Physical Exam   GENERAL: healthy, alert and no distress  EYES: Eyes grossly normal to inspection, PERRL and conjunctivae and sclerae normal  MS: There is minimal swelling on the  dorsal aspect of left foot  There is some discoloration in that area like a bruise  She is able to bear weight and walk  Tender on palpation and some pain on flexion and extension  SKIN: no suspicious lesions or rashes

## 2021-05-25 ENCOUNTER — ANTICOAGULATION THERAPY VISIT (OUTPATIENT)
Dept: NURSING | Facility: CLINIC | Age: 76
End: 2021-05-25

## 2021-05-25 ENCOUNTER — TELEPHONE (OUTPATIENT)
Dept: FAMILY MEDICINE | Facility: CLINIC | Age: 76
End: 2021-05-25

## 2021-05-25 ENCOUNTER — ALLIED HEALTH/NURSE VISIT (OUTPATIENT)
Dept: NURSING | Facility: CLINIC | Age: 76
End: 2021-05-25
Payer: COMMERCIAL

## 2021-05-25 VITALS — HEART RATE: 63 BPM | DIASTOLIC BLOOD PRESSURE: 85 MMHG | SYSTOLIC BLOOD PRESSURE: 138 MMHG

## 2021-05-25 DIAGNOSIS — Z79.01 LONG-TERM (CURRENT) USE OF ANTICOAGULANTS, INR GOAL 2.0-3.0: ICD-10-CM

## 2021-05-25 DIAGNOSIS — I48.0 PAF (PAROXYSMAL ATRIAL FIBRILLATION) (H): Primary | ICD-10-CM

## 2021-05-25 DIAGNOSIS — I48.20 CHRONIC ATRIAL FIBRILLATION (H): ICD-10-CM

## 2021-05-25 DIAGNOSIS — I48.0 PAF (PAROXYSMAL ATRIAL FIBRILLATION) (H): ICD-10-CM

## 2021-05-25 DIAGNOSIS — I48.91 ATRIAL FIBRILLATION, UNSPECIFIED TYPE (H): ICD-10-CM

## 2021-05-25 DIAGNOSIS — I10 HYPERTENSION GOAL BP (BLOOD PRESSURE) < 140/90: Primary | ICD-10-CM

## 2021-05-25 LAB
ALBUMIN SERPL-MCNC: 3.7 G/DL (ref 3.4–5)
ALP SERPL-CCNC: 58 U/L (ref 40–150)
ALT SERPL W P-5'-P-CCNC: 32 U/L (ref 0–50)
ANION GAP SERPL CALCULATED.3IONS-SCNC: 3 MMOL/L (ref 3–14)
AST SERPL W P-5'-P-CCNC: 34 U/L (ref 0–45)
BILIRUB SERPL-MCNC: 0.4 MG/DL (ref 0.2–1.3)
BUN SERPL-MCNC: 13 MG/DL (ref 7–30)
CALCIUM SERPL-MCNC: 9.5 MG/DL (ref 8.5–10.1)
CHLORIDE SERPL-SCNC: 107 MMOL/L (ref 94–109)
CO2 SERPL-SCNC: 29 MMOL/L (ref 20–32)
CREAT SERPL-MCNC: 1.06 MG/DL (ref 0.52–1.04)
GFR SERPL CREATININE-BSD FRML MDRD: 51 ML/MIN/{1.73_M2}
GLUCOSE SERPL-MCNC: 91 MG/DL (ref 70–99)
INR PPP: 2.8 (ref 0.86–1.14)
POTASSIUM SERPL-SCNC: 4.3 MMOL/L (ref 3.4–5.3)
PROT SERPL-MCNC: 7.2 G/DL (ref 6.8–8.8)
SODIUM SERPL-SCNC: 139 MMOL/L (ref 133–144)

## 2021-05-25 PROCEDURE — 36415 COLL VENOUS BLD VENIPUNCTURE: CPT | Performed by: INTERNAL MEDICINE

## 2021-05-25 PROCEDURE — 80053 COMPREHEN METABOLIC PANEL: CPT | Performed by: INTERNAL MEDICINE

## 2021-05-25 PROCEDURE — 85610 PROTHROMBIN TIME: CPT | Performed by: INTERNAL MEDICINE

## 2021-05-25 NOTE — TELEPHONE ENCOUNTER
Patient should be on way to lab appointment closing encounter    Miranda Rosado RN, BSN, CMSRN  [facility]

## 2021-05-25 NOTE — PROGRESS NOTES
ANTICOAGULATION MANAGEMENT     Patient Name:  Jenn Joseph  Date:  2021    ASSESSMENT /SUBJECTIVE:    Today's INR result of 2.80 is therapeutic. Goal INR of 2.0-3.0      Warfarin dose taken: Warfarin taken as instructed    Diet: No new diet changes affecting INR    Medication changes/ interactions: No new medications/supplements affecting INR    Previous INR: Therapeutic     S/S of bleeding or thromboembolism: No    New injury or illness: No    Upcoming surgery, procedure or cardioversion: No    Additional findings: None      PLAN:    Telephone call with Jenn regarding INR result and instructed:     Warfarin Dosing Instructions: Continue your current warfarin dose 2 mg Mon,Wed,Fri; 1 mg all other days    Instructed patient to follow up no later than: 6 weeks  Lab visit scheduled    Education provided: None required      Jenn verbalizes understanding and agrees to warfarin dosing plan.    Instructed to call the Anticoagulation Clinic for any changes, questions or concerns. (#[phone])        Kim Zhang RN      OBJECTIVE:  Recent labs: (last 7 days)     21  0958   INR 2.80*         INR assessment THER    Recheck INR In: 6 WEEKS    INR Location Clinic      Anticoagulation Summary  As of 2021    INR goal:  2.0-3.0   TTR:  85.6 % (1 y)   INR used for dosin.80 (2021)   Warfarin maintenance plan:  2 mg (1 mg x 2) every Mon, Wed, Fri; 1 mg (1 mg x 1) all other days   Full warfarin instructions:  2 mg every Mon, Wed, Fri; 1 mg all other days   Weekly warfarin total:  10 mg   No change documented:  Kim Zhang RN   Plan last modified:  Kim Zhang RN (2021)   Next INR check:  2021   Priority:  Maintenance   Target end date:  Indefinite    Indications    Long-term (current) use of anticoagulants [Z79.01] (Resolved) [Z79.01]  Atrial fibrillation/flutter (Resolved)  Atrial fibrillation  unspecified type (H) [I48.91]  Chronic atrial fibrillation  (H) [I48.20]             Anticoagulation Episode Summary     INR check location:      Preferred lab:      Send INR reminders to:  ELIZABETH CROUCH    Comments:  every 6 weeks  Referral renewed 2/17/21      Anticoagulation Care Providers     Provider Role Specialty Phone number    Drake Lucas MD Referring Internal Medicine [phone]

## 2021-05-25 NOTE — TELEPHONE ENCOUNTER
Patient calling and coming in for her INR and blood tests ordered by Dr Balderas and Dr Lucas and wants to know if she should be fasting. Writer did not see orders from Dr Balderas or Dr Lucas. Advised patient to come in fasting just in case. Added notes to the lab appt labs per Dr Balderas and Dr Lucas.   Sunshine Matson [facility]  2nd Floor  Primary Care

## 2021-05-25 NOTE — PROGRESS NOTES
I met with Jenn Joseph at the request of Dr. Luacs to recheck her blood pressure.  Blood pressure medications on the med list were reviewed with patient.    Patient has taken all medications as per usual regimen: yes  Patient reports tolerating them without any issues or concerns: yes    Vitals:    05/25/21 1016   BP: 138/85   BP Location: Left arm   Patient Position: Sitting   Cuff Size: Adult Regular   Pulse: 63       Blood pressure was taken, previous encounter was reviewed, recorded blood pressure below 140/90. Patient was discharged and the note will be sent to the provider for final review.    KADE De La Cruz MA

## 2021-05-25 NOTE — TELEPHONE ENCOUNTER
Patient calling back and needs the lab fax # for Cardiology to fax their orders. I gave her the [facility] lab fax #.  Sunshine Matson [facility]  2nd Floor  Primary Care

## 2021-05-27 ENCOUNTER — TELEPHONE (OUTPATIENT)
Dept: FAMILY MEDICINE | Facility: CLINIC | Age: 76
End: 2021-05-27

## 2021-05-27 NOTE — TELEPHONE ENCOUNTER
Calling back per instructed after 1 week- Jenn is not noting improvement    Requesting orthopaedic referral per plan of care office visit 05/20/2021    Call Jenn back [phone] with referral instructions     Miranda Rosado RN, BSN, CMSRN  [facility]

## 2021-06-09 ENCOUNTER — OFFICE VISIT (OUTPATIENT)
Dept: PODIATRY | Facility: CLINIC | Age: 76
End: 2021-06-09
Payer: COMMERCIAL

## 2021-06-09 ENCOUNTER — ANCILLARY PROCEDURE (OUTPATIENT)
Dept: GENERAL RADIOLOGY | Facility: CLINIC | Age: 76
End: 2021-06-09
Attending: PODIATRIST
Payer: COMMERCIAL

## 2021-06-09 VITALS
DIASTOLIC BLOOD PRESSURE: 70 MMHG | SYSTOLIC BLOOD PRESSURE: 140 MMHG | BODY MASS INDEX: 24.86 KG/M2 | WEIGHT: 137 LBS | HEART RATE: 68 BPM

## 2021-06-09 DIAGNOSIS — S93.602A FOOT SPRAIN, LEFT, INITIAL ENCOUNTER: ICD-10-CM

## 2021-06-09 DIAGNOSIS — S92.352A CLOSED DISPLACED FRACTURE OF FIFTH METATARSAL BONE OF LEFT FOOT, INITIAL ENCOUNTER: Primary | ICD-10-CM

## 2021-06-09 PROCEDURE — 73630 X-RAY EXAM OF FOOT: CPT | Mod: LT | Performed by: RADIOLOGY

## 2021-06-09 PROCEDURE — 28470 CLTX METATARSAL FX WO MNP EA: CPT | Performed by: PODIATRIST

## 2021-06-09 NOTE — LETTER
6/9/2021         RE: Jenn Joseph  [address]  [place] [address]        Dear Colleague,    Thank you for referring your patient, Jenn Joseph, to the [facility]. Please see a copy of my visit note below.    Subjective:    Pt is seen today in consult from Dr. Green for painful left foot.  1 month ago she twisted her left foot.  Had pain swelling and some bruising initially.  Pain aggravated by activity and relieved by rest.  She points the distal fifth metatarsal as to where it hurts the most.  It is only slightly better now.  Patient is anticoagulated.  She is retired.      ROS:  A 10-point review of systems was performed and is positive for that noted in the HPI and as seen above.  All other areas are negative.          Allergies   Allergen Reactions     Diflucan [Fluconazole] Rash     NOT EXACTLY SURE     Lamisil Rash     Terbinafine Rash       Current Outpatient Medications   Medication Sig Dispense Refill     atenolol (TENORMIN) 25 MG tablet Take 0.5 tablets (12.5 mg) by mouth daily 45 tablet 3     Calcium Carbonate Antacid (TUMS PO) Take  by mouth. prn       Calcium-Magnesium-Zinc 333-133-5 MG TABS Take by mouth 2 times daily        Cholecalciferol (VITAMIN D) 1000 UNITS capsule Take 1 capsule by mouth daily.       Coenzyme Q10 (CO Q-10) 200 MG CAPS Take 200 mg by mouth daily 90 capsule 3     desonide (DESOWEN) 0.05 % ointment Apply small amount twice daily to the eyelids for up to two weeks at a time. Take caution to not get it in the eyes. (Patient taking differently: as needed Apply small amount twice daily to the eyelids for up to two weeks at a time. Take caution to not get it in the eyes.) 15 g 3     famotidine (PEPCID) 20 MG tablet Take 1 tablet (20 mg) by mouth 2 times daily 180 tablet 3     FLUBLOK QUADRIVALENT 0.5 ML injection        latanoprost (XALATAN) 0.005 % ophthalmic solution instill 1 drop into both eyes once daily  0     lisinopril  (ZESTRIL) 5 MG tablet Take 1 tablet (5 mg) by mouth daily 90 tablet 3     meclizine (ANTIVERT) 25 MG tablet Take 1 tablet (25 mg) by mouth 3 times daily as needed for dizziness 30 tablet 11     Multiple Vitamins-Minerals (MULTIVITAL PO) Once a day       order for DME Equipment being ordered: SP boot walker. 1 each 0     pravastatin (PRAVACHOL) 40 MG tablet Take 1 tablet (40 mg) by mouth daily 90 tablet 3     probiotic CAPS 2 times weekly       propafenone (RYTHMOL SR) 425 MG 12 hr capsule Take 1 capsule (425 mg) by mouth 2 times daily 180 capsule 3     warfarin ANTICOAGULANT (COUMADIN) 1 MG tablet TAKE 2 TABLETS (2 MG) BY MOUTH ONCE DAILY ON MONDAYS, WEDNESDAYS, AND FRIDAYS. TAKE 1 TABLET (1MG) BY MOUTH ONCE DAILY ALL OTHER DAYS OF THE WEEK OR PER DIRECTIONS OFINR NURSE 120 tablet 11       Patient Active Problem List   Diagnosis     GERD (gastroesophageal reflux disease)     CARDIOVASCULAR SCREENING; LDL GOAL LESS THAN 130     Hypertension goal BP (blood pressure) < 140/90     Long-term (current) use of anticoagulants, INR goal 2.0-3.0     Advanced directives, counseling/discussion     CKD (chronic kidney disease) stage 3, GFR 30-59 ml/min     Health Care Home     Seborrhea     Eyelid dermatitis, eczematous     Uterine prolapse     Paroxysmal atrial fibrillation (H)     Pure hypercholesterolemia     Atrial fibrillation, unspecified type (H)     Chronic atrial fibrillation (H)     Cystocele with uterine prolapse     Essential hypertension, benign     Hyperlipidemia       Past Medical History:   Diagnosis Date     Atrial fibrillations      CKD (chronic kidney disease) stage 3, GFR 30-59 ml/min 10/11/2012     Eczema      Fx lunate, wrist-closed 1/09    RIGHT     GERD (gastroesophageal reflux disease)      HTN (hypertension)      Long-term (current) use of anticoagulants, INR goal 2.0-3.0 5/10/2011     Pulmonary nodule 6/09    RECHECK Q 6 MO       Past Surgical History:   Procedure Laterality Date     D & C  80'S      HYSTERECTOMY  11-5-15     ROTATOR CUFF REPAIR RT/LT  2007    LEFT     TUBAL LIGATION  80'S       Family History   Problem Relation Age of Onset     Hypertension Mother      Cerebrovascular Disease Mother      Thyroid Disease Mother      Cerebrovascular Disease Maternal Grandfather      Hypertension Brother      Musculoskeletal Disorder Brother         FIBROMYALGIA     Heart Disease Brother      Cardiovascular Brother         ATRIAL FIB     Musculoskeletal Disorder Sister         FIBROMYALGIA     Thyroid Disease Sister      Allergies Son      Heart Disease Brother      Cardiovascular Brother      Heart Disease Brother      Cardiovascular Brother      Heart Disease Brother      Cardiovascular Brother      Heart Disease Father      Cardiovascular Brother         ATRIAL FIB     Cardiovascular Brother         ATRIAL FIB     Cardiovascular Brother         ATRIAL FIB     Cardiovascular Brother         ATRIAL FIB       Social History     Tobacco Use     Smoking status: Former Smoker     Smokeless tobacco: Never Used   Substance Use Topics     Alcohol use: Yes     Comment: 1 - 2 PER WEEK         Exam:    Vitals: BP (!) 140/70   Pulse 68   Wt 62.1 kg (137 lb)   BMI 24.86 kg/m    BMI: Body mass index is 24.86 kg/m .  Height: Data Unavailable    Constitutional/ general:  Pt is in no apparent distress, appears well-nourished.  Cooperative with history and physical exam.     Psych:  The patient answered questions appropriately.  Normal affect.  Seems to have reasonable expectations, in terms of treatment.     Eyes:  Visual scanning/ tracking without deficit.     Ears:  Response to auditory stimuli is normal.  negative hearing aid devices.  Auricles in proper alignment.     Lymphatic:  Popliteal lymph nodes not enlarged.     Lungs:  Non labored breathing, non labored speech. No cough.  No audible wheezing. Even, quiet breathing.       Vascular:  positive pedal pulses bilaterally for both the DP and PT arteries.  CFT < 3 sec.   positive ankle edema.  positive pedal hair growth.    Neuro:  Alert and oriented x 3. Coordinated gait.  Light touch sensation is intact to the L4, L5, S1 distributions. No obvious deficits.  No evidence of neurological-based weakness, spasticity, or contracture in the lower extremities.     Derm: Normal texture and turgor.  No erythema, ecchymosis, or cyanosis.      Musculoskeletal:    Lower extremity muscle strength is normal.  Patient is ambulatory without an assistive device or brace.  No gross deformities.  Normal arch.  Pain upon palpation  of left 5th metatarsal neck.  Edema  noted.  No erythema or ecchymosis.  No sign of ulceration, infection, or drainage.      Radiographic Exam:  X-Ray Findings:  I personally reviewed the films.    Exam: XR FOOT LEFT G/E 3 VIEWS, 6/9/2021 3:04 PM     Indication: Foot sprain, left, initial encounter     Comparison: MR 12/17/2015     Findings:   AP, oblique, and lateral views of the left foot. Oblique, mildly  displaced fracture of the proximal fifth metatarsal with minimal  overlying soft tissue swelling. No additional fracture identified.  Visualized articulations are preserved. Bilateral bipartite hallux  sesamoid bones. Minimal soft tissue swelling medial to the first MTP  joint. Small plantar calcaneal enthesophyte. Vascular calcifications.                                                                      Impression: Mildly displaced oblique fracture of the distal left fifth  metatarsal.     I have personally reviewed the examination and initial interpretation  and I agree with the findings.    Assessment:  5th met fracture left foot    Plan:  X-rays taken today of left foot.  Explained to patient that she has a fracture of this foot.  It is only mildly displaced.  It is already a month old and some bone trabeculation noted.  Will continue conservative treatment.  Patient to walk on this minimally and to wear stiff soled shoe at all times.  Will return the clinic in 3  weeks for repeat x-ray.  Fracture care.  Thank you for allowing me participate in the care of this patient.        Max Toledo DPM, FACFAS        Again, thank you for allowing me to participate in the care of your patient.        Sincerely,        Max Toledo DPM

## 2021-06-09 NOTE — PATIENT INSTRUCTIONS
We wish you continued good healing. If you have any questions or concerns, please do not hesitate to contact us at [phone]    [id number] (secure e-mail communication and access to your chart) to send a message or to make an appointment.    Please remember to call and schedule a follow up appointment if one was recommended at your earliest convenience.     +++OF MARCH 2020+++ LOCATION AND HOURS HAVE CHANGED    PLEASE CALL CLINICS TO VERIFY DAYS AND TIMES  PODIATRY CLINIC HOURS  TELEPHONE NUMBER    Dr. Max BRAUNPJER [facility]        Clinics:  [place] [facility]   Tuesday 1PM-6PM  [place]  Wednesday 745AM-330PM  [place]  Thursday/Friday 745AM-230PM  [place] [facility] APPOINTMENTS  [phone]    [place] APPOINTMENTS  [phone]          If you need a medication refill, please contact us you may need lab work and/or a follow up visit prior to your refill (i.e. Antifungal medications).    If MRI needed please call Imaging at [phone] or [phone]    HOW DO I GET MY KNEE SCOOTER? Knee scooters can be picked up at ANY Medical Supply stores with your knee scooter Prescription.  OR    Bring your signed prescription to an [facility] Medical Equipment showroom.

## 2021-06-10 NOTE — PROGRESS NOTES
Subjective:    Pt is seen today in consult from Dr. Green for painful left foot.  1 month ago she twisted her left foot.  Had pain swelling and some bruising initially.  Pain aggravated by activity and relieved by rest.  She points the distal fifth metatarsal as to where it hurts the most.  It is only slightly better now.  Patient is anticoagulated.  She is retired.      ROS:  A 10-point review of systems was performed and is positive for that noted in the HPI and as seen above.  All other areas are negative.          Allergies   Allergen Reactions     Diflucan [Fluconazole] Rash     NOT EXACTLY SURE     Lamisil Rash     Terbinafine Rash       Current Outpatient Medications   Medication Sig Dispense Refill     atenolol (TENORMIN) 25 MG tablet Take 0.5 tablets (12.5 mg) by mouth daily 45 tablet 3     Calcium Carbonate Antacid (TUMS PO) Take  by mouth. prn       Calcium-Magnesium-Zinc 333-133-5 MG TABS Take by mouth 2 times daily        Cholecalciferol (VITAMIN D) 1000 UNITS capsule Take 1 capsule by mouth daily.       Coenzyme Q10 (CO Q-10) 200 MG CAPS Take 200 mg by mouth daily 90 capsule 3     desonide (DESOWEN) 0.05 % ointment Apply small amount twice daily to the eyelids for up to two weeks at a time. Take caution to not get it in the eyes. (Patient taking differently: as needed Apply small amount twice daily to the eyelids for up to two weeks at a time. Take caution to not get it in the eyes.) 15 g 3     famotidine (PEPCID) 20 MG tablet Take 1 tablet (20 mg) by mouth 2 times daily 180 tablet 3     FLUBLOK QUADRIVALENT 0.5 ML injection        latanoprost (XALATAN) 0.005 % ophthalmic solution instill 1 drop into both eyes once daily  0     lisinopril (ZESTRIL) 5 MG tablet Take 1 tablet (5 mg) by mouth daily 90 tablet 3     meclizine (ANTIVERT) 25 MG tablet Take 1 tablet (25 mg) by mouth 3 times daily as needed for dizziness 30 tablet 11     Multiple Vitamins-Minerals (MULTIVITAL PO) Once a day       order for DME  Equipment being ordered: SP boot walker. 1 each 0     pravastatin (PRAVACHOL) 40 MG tablet Take 1 tablet (40 mg) by mouth daily 90 tablet 3     probiotic CAPS 2 times weekly       propafenone (RYTHMOL SR) 425 MG 12 hr capsule Take 1 capsule (425 mg) by mouth 2 times daily 180 capsule 3     warfarin ANTICOAGULANT (COUMADIN) 1 MG tablet TAKE 2 TABLETS (2 MG) BY MOUTH ONCE DAILY ON MONDAYS, WEDNESDAYS, AND FRIDAYS. TAKE 1 TABLET (1MG) BY MOUTH ONCE DAILY ALL OTHER DAYS OF THE WEEK OR PER DIRECTIONS OFINR NURSE 120 tablet 11       Patient Active Problem List   Diagnosis     GERD (gastroesophageal reflux disease)     CARDIOVASCULAR SCREENING; LDL GOAL LESS THAN 130     Hypertension goal BP (blood pressure) < 140/90     Long-term (current) use of anticoagulants, INR goal 2.0-3.0     Advanced directives, counseling/discussion     CKD (chronic kidney disease) stage 3, GFR 30-59 ml/min     Health Care Home     Seborrhea     Eyelid dermatitis, eczematous     Uterine prolapse     Paroxysmal atrial fibrillation (H)     Pure hypercholesterolemia     Atrial fibrillation, unspecified type (H)     Chronic atrial fibrillation (H)     Cystocele with uterine prolapse     Essential hypertension, benign     Hyperlipidemia       Past Medical History:   Diagnosis Date     Atrial fibrillations      CKD (chronic kidney disease) stage 3, GFR 30-59 ml/min 10/11/2012     Eczema      Fx lunate, wrist-closed 1/09    RIGHT     GERD (gastroesophageal reflux disease)      HTN (hypertension)      Long-term (current) use of anticoagulants, INR goal 2.0-3.0 5/10/2011     Pulmonary nodule 6/09    RECHECK Q 6 MO       Past Surgical History:   Procedure Laterality Date     D & C  80'S     HYSTERECTOMY  11-5-15     ROTATOR CUFF REPAIR RT/LT  2007    LEFT     TUBAL LIGATION  80'S       Family History   Problem Relation Age of Onset     Hypertension Mother      Cerebrovascular Disease Mother      Thyroid Disease Mother      Cerebrovascular Disease Maternal  Grandfather      Hypertension Brother      Musculoskeletal Disorder Brother         FIBROMYALGIA     Heart Disease Brother      Cardiovascular Brother         ATRIAL FIB     Musculoskeletal Disorder Sister         FIBROMYALGIA     Thyroid Disease Sister      Allergies Son      Heart Disease Brother      Cardiovascular Brother      Heart Disease Brother      Cardiovascular Brother      Heart Disease Brother      Cardiovascular Brother      Heart Disease Father      Cardiovascular Brother         ATRIAL FIB     Cardiovascular Brother         ATRIAL FIB     Cardiovascular Brother         ATRIAL FIB     Cardiovascular Brother         ATRIAL FIB       Social History     Tobacco Use     Smoking status: Former Smoker     Smokeless tobacco: Never Used   Substance Use Topics     Alcohol use: Yes     Comment: 1 - 2 PER WEEK         Exam:    Vitals: BP (!) 140/70   Pulse 68   Wt 62.1 kg (137 lb)   BMI 24.86 kg/m    BMI: Body mass index is 24.86 kg/m .  Height: Data Unavailable    Constitutional/ general:  Pt is in no apparent distress, appears well-nourished.  Cooperative with history and physical exam.     Psych:  The patient answered questions appropriately.  Normal affect.  Seems to have reasonable expectations, in terms of treatment.     Eyes:  Visual scanning/ tracking without deficit.     Ears:  Response to auditory stimuli is normal.  negative hearing aid devices.  Auricles in proper alignment.     Lymphatic:  Popliteal lymph nodes not enlarged.     Lungs:  Non labored breathing, non labored speech. No cough.  No audible wheezing. Even, quiet breathing.       Vascular:  positive pedal pulses bilaterally for both the DP and PT arteries.  CFT < 3 sec.  positive ankle edema.  positive pedal hair growth.    Neuro:  Alert and oriented x 3. Coordinated gait.  Light touch sensation is intact to the L4, L5, S1 distributions. No obvious deficits.  No evidence of neurological-based weakness, spasticity, or contracture in the  lower extremities.     Derm: Normal texture and turgor.  No erythema, ecchymosis, or cyanosis.      Musculoskeletal:    Lower extremity muscle strength is normal.  Patient is ambulatory without an assistive device or brace.  No gross deformities.  Normal arch.  Pain upon palpation  of left 5th metatarsal neck.  Edema  noted.  No erythema or ecchymosis.  No sign of ulceration, infection, or drainage.      Radiographic Exam:  X-Ray Findings:  I personally reviewed the films.    Exam: XR FOOT LEFT G/E 3 VIEWS, 6/9/2021 3:04 PM     Indication: Foot sprain, left, initial encounter     Comparison: MR 12/17/2015     Findings:   AP, oblique, and lateral views of the left foot. Oblique, mildly  displaced fracture of the proximal fifth metatarsal with minimal  overlying soft tissue swelling. No additional fracture identified.  Visualized articulations are preserved. Bilateral bipartite hallux  sesamoid bones. Minimal soft tissue swelling medial to the first MTP  joint. Small plantar calcaneal enthesophyte. Vascular calcifications.                                                                      Impression: Mildly displaced oblique fracture of the distal left fifth  metatarsal.     I have personally reviewed the examination and initial interpretation  and I agree with the findings.    Assessment:  5th met fracture left foot    Plan:  X-rays taken today of left foot.  Explained to patient that she has a fracture of this foot.  It is only mildly displaced.  It is already a month old and some bone trabeculation noted.  Will continue conservative treatment.  Patient to walk on this minimally and to wear stiff soled shoe at all times.  Will return the clinic in 3 weeks for repeat x-ray.  Fracture care.  Thank you for allowing me participate in the care of this patient.        Max Toledo DPM, FACFAS

## 2021-07-06 ENCOUNTER — ANTICOAGULATION THERAPY VISIT (OUTPATIENT)
Dept: NURSING | Facility: CLINIC | Age: 76
End: 2021-07-06

## 2021-07-06 DIAGNOSIS — I48.20 CHRONIC ATRIAL FIBRILLATION (H): ICD-10-CM

## 2021-07-06 DIAGNOSIS — Z79.01 LONG-TERM (CURRENT) USE OF ANTICOAGULANTS, INR GOAL 2.0-3.0: ICD-10-CM

## 2021-07-06 DIAGNOSIS — I48.91 ATRIAL FIBRILLATION, UNSPECIFIED TYPE (H): ICD-10-CM

## 2021-07-06 LAB
CAPILLARY BLOOD COLLECTION: NORMAL
INR PPP: 2.7 (ref 0.86–1.14)

## 2021-07-06 PROCEDURE — 85610 PROTHROMBIN TIME: CPT | Performed by: INTERNAL MEDICINE

## 2021-07-06 PROCEDURE — 36416 COLLJ CAPILLARY BLOOD SPEC: CPT | Performed by: INTERNAL MEDICINE

## 2021-07-06 NOTE — PROGRESS NOTES
ANTICOAGULATION MANAGEMENT     Jenn Joseph 76 year old female is on warfarin with therapeutic INR result. (Goal INR 2.0-3.0)    Recent labs: (last 7 days)     07/06/21  0949   INR 2.70*       ASSESSMENT     Source(s): Patient/Caregiver Call       Warfarin doses taken: Warfarin taken as instructed    Diet: No new diet changes identified    New illness, injury, or hospitalization: Yes: foot injury over a month ago, May 13 th    Medication/supplement changes: None noted    Signs or symptoms of bleeding or clotting: No    Previous INR: Therapeutic last 2(+) visits    Additional findings: None     PLAN     Recommended plan for no diet, medication or health factor changes affecting INR     Dosing Instructions: Continue your current warfarin dose with next INR in 6 weeks       Summary  As of 7/6/2021    Next INR check:               Telephone call with Jenn who verbalizes understanding and agrees to plan    Lab visit scheduled    Education provided: None required    Plan made per [facility] anticoagulation protocol    Kim Zhang, RN  Anticoagulation Clinic  7/6/2021    _______________________________________________________________________     Anticoagulation Episode Summary     Current INR goal:  2.0-3.0   TTR:  85.6 % (1 y)   Target end date:  Indefinite   Send INR reminders to:  ELIZABETH CROUCH    Indications    Long-term (current) use of anticoagulants [Z79.01] (Resolved) [Z79.01]  Atrial fibrillation/flutter (Resolved)  Atrial fibrillation  unspecified type (H) [I48.91]  Chronic atrial fibrillation (H) [I48.20]           Comments:  every 6 weeks  Referral renewed 2/17/21         Anticoagulation Care Providers     Provider Role Specialty Phone number    Drake Lucas MD Referring Internal Medicine [phone]

## 2021-07-07 ENCOUNTER — ANCILLARY PROCEDURE (OUTPATIENT)
Dept: GENERAL RADIOLOGY | Facility: CLINIC | Age: 76
End: 2021-07-07
Attending: PODIATRIST
Payer: COMMERCIAL

## 2021-07-07 ENCOUNTER — OFFICE VISIT (OUTPATIENT)
Dept: PODIATRY | Facility: CLINIC | Age: 76
End: 2021-07-07
Payer: COMMERCIAL

## 2021-07-07 VITALS — HEART RATE: 70 BPM | SYSTOLIC BLOOD PRESSURE: 118 MMHG | DIASTOLIC BLOOD PRESSURE: 68 MMHG

## 2021-07-07 DIAGNOSIS — S92.352A CLOSED DISPLACED FRACTURE OF FIFTH METATARSAL BONE OF LEFT FOOT, INITIAL ENCOUNTER: Primary | ICD-10-CM

## 2021-07-07 DIAGNOSIS — S92.352A CLOSED DISPLACED FRACTURE OF FIFTH METATARSAL BONE OF LEFT FOOT, INITIAL ENCOUNTER: ICD-10-CM

## 2021-07-07 PROCEDURE — 73630 X-RAY EXAM OF FOOT: CPT | Mod: LT | Performed by: RADIOLOGY

## 2021-07-07 PROCEDURE — 99207 PR FRACTURE CARE IN GLOBAL PERIOD: CPT | Performed by: PODIATRIST

## 2021-07-07 NOTE — LETTER
7/7/2021         RE: Jenn Joseph  [address]  [address]  [facility] [address]        Dear Colleague,    Thank you for referring your patient, Jenn Joseph, to the [facility]. Please see a copy of my visit note below.    Subjective:    6/9/21   Pt is seen today in consult from Dr. Green for painful left foot.  1 month ago she twisted her left foot.  Had pain swelling and some bruising initially.  Pain aggravated by activity and relieved by rest.  She points the distal fifth metatarsal as to where it hurts the most.  It is only slightly better now.  Patient is anticoagulated.  She is retired.    7/7/21 patient is approximately 2 months status post left foot injury with fifth metatarsal neck oblique fracture.  Patient has been wearing walking boot for 1 month now.  She states is feeling much better.  She even walked a little bit yesterday without issue and did not have any pain.  States edema decreasing.  Denies calf pain or shortness of breath.  She is leaving for vacation in [facility] in 1 month.      ROS:  See above         Allergies   Allergen Reactions     Diflucan [Fluconazole] Rash     NOT EXACTLY SURE     Lamisil Rash     Terbinafine Rash       Current Outpatient Medications   Medication Sig Dispense Refill     atenolol (TENORMIN) 25 MG tablet Take 0.5 tablets (12.5 mg) by mouth daily 45 tablet 3     Calcium Carbonate Antacid (TUMS PO) Take  by mouth. prn       Calcium-Magnesium-Zinc 333-133-5 MG TABS Take by mouth 2 times daily        Cholecalciferol (VITAMIN D) 1000 UNITS capsule Take 1 capsule by mouth daily.       Coenzyme Q10 (CO Q-10) 200 MG CAPS Take 200 mg by mouth daily 90 capsule 3     desonide (DESOWEN) 0.05 % ointment Apply small amount twice daily to the eyelids for up to two weeks at a time. Take caution to not get it in the eyes. (Patient taking differently: as needed Apply small amount twice daily to the eyelids for up to two weeks at a time. Take  caution to not get it in the eyes.) 15 g 3     famotidine (PEPCID) 20 MG tablet Take 1 tablet (20 mg) by mouth 2 times daily 180 tablet 3     FLUBLOK QUADRIVALENT 0.5 ML injection        latanoprost (XALATAN) 0.005 % ophthalmic solution instill 1 drop into both eyes once daily  0     lisinopril (ZESTRIL) 5 MG tablet Take 1 tablet (5 mg) by mouth daily 90 tablet 3     meclizine (ANTIVERT) 25 MG tablet Take 1 tablet (25 mg) by mouth 3 times daily as needed for dizziness 30 tablet 11     Multiple Vitamins-Minerals (MULTIVITAL PO) Once a day       order for DME Equipment being ordered: SP boot walker. 1 each 0     pravastatin (PRAVACHOL) 40 MG tablet Take 1 tablet (40 mg) by mouth daily 90 tablet 3     probiotic CAPS 2 times weekly       propafenone (RYTHMOL SR) 425 MG 12 hr capsule Take 1 capsule (425 mg) by mouth 2 times daily 180 capsule 3     warfarin ANTICOAGULANT (COUMADIN) 1 MG tablet TAKE 2 TABLETS (2 MG) BY MOUTH ONCE DAILY ON MONDAYS, WEDNESDAYS, AND FRIDAYS. TAKE 1 TABLET (1MG) BY MOUTH ONCE DAILY ALL OTHER DAYS OF THE WEEK OR PER DIRECTIONS OFINR NURSE 120 tablet 11       Patient Active Problem List   Diagnosis     GERD (gastroesophageal reflux disease)     CARDIOVASCULAR SCREENING; LDL GOAL LESS THAN 130     Hypertension goal BP (blood pressure) < 140/90     Long-term (current) use of anticoagulants, INR goal 2.0-3.0     Advanced directives, counseling/discussion     CKD (chronic kidney disease) stage 3, GFR 30-59 ml/min     Health Care Home     Seborrhea     Eyelid dermatitis, eczematous     Uterine prolapse     Paroxysmal atrial fibrillation (H)     Pure hypercholesterolemia     Atrial fibrillation, unspecified type (H)     Chronic atrial fibrillation (H)     Cystocele with uterine prolapse     Essential hypertension, benign     Hyperlipidemia       Past Medical History:   Diagnosis Date     Atrial fibrillations      CKD (chronic kidney disease) stage 3, GFR 30-59 ml/min 10/11/2012     Eczema      Fx  lunate, wrist-closed 1/09    RIGHT     GERD (gastroesophageal reflux disease)      HTN (hypertension)      Long-term (current) use of anticoagulants, INR goal 2.0-3.0 5/10/2011     Pulmonary nodule 6/09    RECHECK Q 6 MO       Past Surgical History:   Procedure Laterality Date     D & C  80'S     HYSTERECTOMY  11-5-15     ROTATOR CUFF REPAIR RT/LT  2007    LEFT     TUBAL LIGATION  80'S       Family History   Problem Relation Age of Onset     Hypertension Mother      Cerebrovascular Disease Mother      Thyroid Disease Mother      Cerebrovascular Disease Maternal Grandfather      Hypertension Brother      Musculoskeletal Disorder Brother         FIBROMYALGIA     Heart Disease Brother      Cardiovascular Brother         ATRIAL FIB     Musculoskeletal Disorder Sister         FIBROMYALGIA     Thyroid Disease Sister      Allergies Son      Heart Disease Brother      Cardiovascular Brother      Heart Disease Brother      Cardiovascular Brother      Heart Disease Brother      Cardiovascular Brother      Heart Disease Father      Cardiovascular Brother         ATRIAL FIB     Cardiovascular Brother         ATRIAL FIB     Cardiovascular Brother         ATRIAL FIB     Cardiovascular Brother         ATRIAL FIB       Social History     Tobacco Use     Smoking status: Former Smoker     Smokeless tobacco: Never Used   Substance Use Topics     Alcohol use: Yes     Comment: 1 - 2 PER WEEK         Exam:    Vitals: There were no vitals taken for this visit.  BMI: There is no height or weight on file to calculate BMI.  Height: Data Unavailable    Constitutional/ general:  Pt is in no apparent distress, appears well-nourished.  Cooperative with history and physical exam.     Psych:  The patient answered questions appropriately.  Normal affect.  Seems to have reasonable expectations, in terms of treatment.     Eyes:  Visual scanning/ tracking without deficit.     Ears:  Response to auditory stimuli is normal.  negative hearing aid  devices.  Auricles in proper alignment.     Lymphatic:  Popliteal lymph nodes not enlarged.     Lungs:  Non labored breathing, non labored speech. No cough.  No audible wheezing. Even, quiet breathing.       Vascular:  positive pedal pulses bilaterally for both the DP and PT arteries.  CFT < 3 sec.  positive ankle edema.  positive pedal hair growth.    Neuro:  Alert and oriented x 3. Coordinated gait.  Light touch sensation is intact to the L4, L5, S1 distributions. No obvious deficits.  No evidence of neurological-based weakness, spasticity, or contracture in the lower extremities.     Derm: Normal texture and turgor.  No erythema, ecchymosis, or cyanosis.      Musculoskeletal:    Lower extremity muscle strength is normal.  Patient is ambulatory without an assistive device or brace.  No gross deformities.  Normal arch.  Today virtually no pain upon palpation  of left 5th metatarsal neck.  Very scant edema  noted.  No erythema or ecchymosis.  No sign of ulceration, infection, or drainage.      Radiographic Exam:  X-Ray today shows oblique fracture neck of left fifth metatarsal unchanged in position.  We see increased bone callus around the fracture site.    Assessment:  5th met fracture left foot    Plan:  X-rays taken today of left foot.  Discussed with patient both clinically and radiographically there is signs of healing.  She will slowly try to graduate out of walking boot and into stiff supportive shoe.  I made suggestions on shoes.  If in any pain or swelling she will go back to wearing the boot for 4 days and then try again.  She will slowly increase activities.  Discussed she may consider getting stiff hightop hiking boots for her trip to keep bone offloaded as well as preventing another inversion injury.  RTC as needed.  Fracture care.        Max Toledo DPM, FACFAS        Again, thank you for allowing me to participate in the care of your patient.        Sincerely,        Max Toledo DPM

## 2021-07-07 NOTE — PROGRESS NOTES
Subjective:    6/9/21   Pt is seen today in consult from Dr. Green for painful left foot.  1 month ago she twisted her left foot.  Had pain swelling and some bruising initially.  Pain aggravated by activity and relieved by rest.  She points the distal fifth metatarsal as to where it hurts the most.  It is only slightly better now.  Patient is anticoagulated.  She is retired.    7/7/21 patient is approximately 2 months status post left foot injury with fifth metatarsal neck oblique fracture.  Patient has been wearing walking boot for 1 month now.  She states is feeling much better.  She even walked a little bit yesterday without issue and did not have any pain.  States edema decreasing.  Denies calf pain or shortness of breath.  She is leaving for vacation in [facility] in [place] in 1 month.      ROS:  See above         Allergies   Allergen Reactions     Diflucan [Fluconazole] Rash     NOT EXACTLY SURE     Lamisil Rash     Terbinafine Rash       Current Outpatient Medications   Medication Sig Dispense Refill     atenolol (TENORMIN) 25 MG tablet Take 0.5 tablets (12.5 mg) by mouth daily 45 tablet 3     Calcium Carbonate Antacid (TUMS PO) Take  by mouth. prn       Calcium-Magnesium-Zinc 333-133-5 MG TABS Take by mouth 2 times daily        Cholecalciferol (VITAMIN D) 1000 UNITS capsule Take 1 capsule by mouth daily.       Coenzyme Q10 (CO Q-10) 200 MG CAPS Take 200 mg by mouth daily 90 capsule 3     desonide (DESOWEN) 0.05 % ointment Apply small amount twice daily to the eyelids for up to two weeks at a time. Take caution to not get it in the eyes. (Patient taking differently: as needed Apply small amount twice daily to the eyelids for up to two weeks at a time. Take caution to not get it in the eyes.) 15 g 3     famotidine (PEPCID) 20 MG tablet Take 1 tablet (20 mg) by mouth 2 times daily 180 tablet 3     FLUBLOK QUADRIVALENT 0.5 ML injection        latanoprost (XALATAN) 0.005 % ophthalmic solution instill 1 drop into both eyes once  daily  0     lisinopril (ZESTRIL) 5 MG tablet Take 1 tablet (5 mg) by mouth daily 90 tablet 3     meclizine (ANTIVERT) 25 MG tablet Take 1 tablet (25 mg) by mouth 3 times daily as needed for dizziness 30 tablet 11     Multiple Vitamins-Minerals (MULTIVITAL PO) Once a day       order for DME Equipment being ordered: SP boot walker. 1 each 0     pravastatin (PRAVACHOL) 40 MG tablet Take 1 tablet (40 mg) by mouth daily 90 tablet 3     probiotic CAPS 2 times weekly       propafenone (RYTHMOL SR) 425 MG 12 hr capsule Take 1 capsule (425 mg) by mouth 2 times daily 180 capsule 3     warfarin ANTICOAGULANT (COUMADIN) 1 MG tablet TAKE 2 TABLETS (2 MG) BY MOUTH ONCE DAILY ON MONDAYS, WEDNESDAYS, AND FRIDAYS. TAKE 1 TABLET (1MG) BY MOUTH ONCE DAILY ALL OTHER DAYS OF THE WEEK OR PER DIRECTIONS OFINR NURSE 120 tablet 11       Patient Active Problem List   Diagnosis     GERD (gastroesophageal reflux disease)     CARDIOVASCULAR SCREENING; LDL GOAL LESS THAN 130     Hypertension goal BP (blood pressure) < 140/90     Long-term (current) use of anticoagulants, INR goal 2.0-3.0     Advanced directives, counseling/discussion     CKD (chronic kidney disease) stage 3, GFR 30-59 ml/min     Health Care Home     Seborrhea     Eyelid dermatitis, eczematous     Uterine prolapse     Paroxysmal atrial fibrillation (H)     Pure hypercholesterolemia     Atrial fibrillation, unspecified type (H)     Chronic atrial fibrillation (H)     Cystocele with uterine prolapse     Essential hypertension, benign     Hyperlipidemia       Past Medical History:   Diagnosis Date     Atrial fibrillations      CKD (chronic kidney disease) stage 3, GFR 30-59 ml/min 10/11/2012     Eczema      Fx lunate, wrist-closed 1/09    RIGHT     GERD (gastroesophageal reflux disease)      HTN (hypertension)      Long-term (current) use of anticoagulants, INR goal 2.0-3.0 5/10/2011     Pulmonary nodule 6/09    RECHECK Q 6 MO       Past Surgical History:   Procedure Laterality  Date     D & C  80'S     HYSTERECTOMY  11-5-15     ROTATOR CUFF REPAIR RT/LT  2007    LEFT     TUBAL LIGATION  80'S       Family History   Problem Relation Age of Onset     Hypertension Mother      Cerebrovascular Disease Mother      Thyroid Disease Mother      Cerebrovascular Disease Maternal Grandfather      Hypertension Brother      Musculoskeletal Disorder Brother         FIBROMYALGIA     Heart Disease Brother      Cardiovascular Brother         ATRIAL FIB     Musculoskeletal Disorder Sister         FIBROMYALGIA     Thyroid Disease Sister      Allergies Son      Heart Disease Brother      Cardiovascular Brother      Heart Disease Brother      Cardiovascular Brother      Heart Disease Brother      Cardiovascular Brother      Heart Disease Father      Cardiovascular Brother         ATRIAL FIB     Cardiovascular Brother         ATRIAL FIB     Cardiovascular Brother         ATRIAL FIB     Cardiovascular Brother         ATRIAL FIB       Social History     Tobacco Use     Smoking status: Former Smoker     Smokeless tobacco: Never Used   Substance Use Topics     Alcohol use: Yes     Comment: 1 - 2 PER WEEK         Exam:    Vitals: There were no vitals taken for this visit.  BMI: There is no height or weight on file to calculate BMI.  Height: Data Unavailable    Constitutional/ general:  Pt is in no apparent distress, appears well-nourished.  Cooperative with history and physical exam.     Psych:  The patient answered questions appropriately.  Normal affect.  Seems to have reasonable expectations, in terms of treatment.     Eyes:  Visual scanning/ tracking without deficit.     Ears:  Response to auditory stimuli is normal.  negative hearing aid devices.  Auricles in proper alignment.     Lymphatic:  Popliteal lymph nodes not enlarged.     Lungs:  Non labored breathing, non labored speech. No cough.  No audible wheezing. Even, quiet breathing.       Vascular:  positive pedal pulses bilaterally for both the DP and PT  arteries.  CFT < 3 sec.  positive ankle edema.  positive pedal hair growth.    Neuro:  Alert and oriented x 3. Coordinated gait.  Light touch sensation is intact to the L4, L5, S1 distributions. No obvious deficits.  No evidence of neurological-based weakness, spasticity, or contracture in the lower extremities.     Derm: Normal texture and turgor.  No erythema, ecchymosis, or cyanosis.      Musculoskeletal:    Lower extremity muscle strength is normal.  Patient is ambulatory without an assistive device or brace.  No gross deformities.  Normal arch.  Today virtually no pain upon palpation  of left 5th metatarsal neck.  Very scant edema  noted.  No erythema or ecchymosis.  No sign of ulceration, infection, or drainage.      Radiographic Exam:  X-Ray today shows oblique fracture neck of left fifth metatarsal unchanged in position.  We see increased bone callus around the fracture site.    Assessment:  5th met fracture left foot    Plan:  X-rays taken today of left foot.  Discussed with patient both clinically and radiographically there is signs of healing.  She will slowly try to graduate out of walking boot and into stiff supportive shoe.  I made suggestions on shoes.  If in any pain or swelling she will go back to wearing the boot for 4 days and then try again.  She will slowly increase activities.  Discussed she may consider getting stiff hightop hiking boots for her trip to keep bone offloaded as well as preventing another inversion injury.  RTC as needed.  Fracture care.        Max Toledo DPM, FACFAS

## 2021-07-23 ENCOUNTER — E-VISIT (OUTPATIENT)
Dept: FAMILY MEDICINE | Facility: CLINIC | Age: 76
End: 2021-07-23
Payer: COMMERCIAL

## 2021-07-23 DIAGNOSIS — Z71.89 ADVICE GIVEN ABOUT COVID-19 VIRUS INFECTION: Primary | ICD-10-CM

## 2021-07-23 PROCEDURE — 99421 OL DIG E/M SVC 5-10 MIN: CPT | Performed by: INTERNAL MEDICINE

## 2021-08-04 DIAGNOSIS — Z71.89 ADVICE GIVEN ABOUT COVID-19 VIRUS INFECTION: ICD-10-CM

## 2021-08-04 PROCEDURE — U0005 INFEC AGEN DETEC AMPLI PROBE: HCPCS

## 2021-08-04 PROCEDURE — U0003 INFECTIOUS AGENT DETECTION BY NUCLEIC ACID (DNA OR RNA); SEVERE ACUTE RESPIRATORY SYNDROME CORONAVIRUS 2 (SARS-COV-2) (CORONAVIRUS DISEASE [COVID-19]), AMPLIFIED PROBE TECHNIQUE, MAKING USE OF HIGH THROUGHPUT TECHNOLOGIES AS DESCRIBED BY CMS-2020-01-R: HCPCS

## 2021-08-05 LAB — SARS-COV-2 RNA RESP QL NAA+PROBE: NEGATIVE

## 2021-08-17 ENCOUNTER — LAB (OUTPATIENT)
Dept: LAB | Facility: CLINIC | Age: 76
End: 2021-08-17
Payer: COMMERCIAL

## 2021-08-17 ENCOUNTER — ANTICOAGULATION THERAPY VISIT (OUTPATIENT)
Dept: ANTICOAGULATION | Facility: CLINIC | Age: 76
End: 2021-08-17

## 2021-08-17 DIAGNOSIS — I48.20 CHRONIC ATRIAL FIBRILLATION (H): ICD-10-CM

## 2021-08-17 DIAGNOSIS — I48.91 ATRIAL FIBRILLATION, UNSPECIFIED TYPE (H): Primary | ICD-10-CM

## 2021-08-17 DIAGNOSIS — I48.91 ATRIAL FIBRILLATION, UNSPECIFIED TYPE (H): ICD-10-CM

## 2021-08-17 LAB — INR BLD: 2 (ref 0.9–1.1)

## 2021-08-17 PROCEDURE — 85610 PROTHROMBIN TIME: CPT

## 2021-08-17 PROCEDURE — 36416 COLLJ CAPILLARY BLOOD SPEC: CPT

## 2021-08-17 NOTE — PROGRESS NOTES
ANTICOAGULATION MANAGEMENT     Jenn Joseph 76 year old female is on warfarin with therapeutic INR result. (Goal INR 2.0-3.0)    Recent labs: (last 7 days)     08/17/21  0958   INR 2.0*       ASSESSMENT     Source(s): Patient/Caregiver Call       Warfarin doses taken: Warfarin taken as instructed    Diet: Increased greens/vitamin K in diet; plans to resume previous intake    New illness, injury, or hospitalization: No    Medication/supplement changes: None noted    Signs or symptoms of bleeding or clotting: No    Previous INR: Therapeutic last 2(+) visits    Additional findings: None     PLAN     Recommended plan for no diet, medication or health factor changes affecting INR     Dosing Instructions: Continue your current warfarin dose with next INR in 6 weeks       Summary  As of 8/17/2021    Full warfarin instructions:  2 mg every Mon, Wed, Fri; 1 mg all other days   Next INR check:               Telephone call with Jenn who verbalizes understanding and agrees to plan    Patient offered & declined to schedule next visit    Education provided: None required    Plan made per [facility] anticoagulation protocol    Kim Zhang, RN  Anticoagulation Clinic  8/17/2021    _______________________________________________________________________     Anticoagulation Episode Summary     Current INR goal:  2.0-3.0   TTR:  85.6 % (1 y)   Target end date:  Indefinite   Send INR reminders to:  ELIZABETH CROUCH    Indications    Long-term (current) use of anticoagulants [Z79.01] (Resolved) [Z79.01]  Atrial fibrillation/flutter (Resolved)  Atrial fibrillation  unspecified type (H) [I48.91]  Chronic atrial fibrillation (H) [I48.20]           Comments:  every 6 weeks  Referral renewed 2/17/21         Anticoagulation Care Providers     Provider Role Specialty Phone number    Drake Lucas MD Referring Internal Medicine [phone]

## 2021-09-08 ENCOUNTER — ANTICOAGULATION THERAPY VISIT (OUTPATIENT)
Dept: ANTICOAGULATION | Facility: CLINIC | Age: 76
End: 2021-09-08

## 2021-09-08 ENCOUNTER — OFFICE VISIT (OUTPATIENT)
Dept: FAMILY MEDICINE | Facility: CLINIC | Age: 76
End: 2021-09-08
Payer: COMMERCIAL

## 2021-09-08 VITALS
WEIGHT: 138.6 LBS | HEART RATE: 63 BPM | DIASTOLIC BLOOD PRESSURE: 82 MMHG | OXYGEN SATURATION: 96 % | TEMPERATURE: 97.1 F | HEIGHT: 62 IN | SYSTOLIC BLOOD PRESSURE: 173 MMHG | BODY MASS INDEX: 25.51 KG/M2

## 2021-09-08 DIAGNOSIS — Z01.818 PREOP GENERAL PHYSICAL EXAM: Primary | ICD-10-CM

## 2021-09-08 DIAGNOSIS — I48.20 CHRONIC ATRIAL FIBRILLATION (H): ICD-10-CM

## 2021-09-08 DIAGNOSIS — Z79.01 CHRONIC ANTICOAGULATION: ICD-10-CM

## 2021-09-08 DIAGNOSIS — I48.91 ATRIAL FIBRILLATION, UNSPECIFIED TYPE (H): Primary | ICD-10-CM

## 2021-09-08 LAB
BASOPHILS # BLD AUTO: 0 10E3/UL (ref 0–0.2)
BASOPHILS NFR BLD AUTO: 1 %
EOSINOPHIL # BLD AUTO: 0.1 10E3/UL (ref 0–0.7)
EOSINOPHIL NFR BLD AUTO: 1 %
ERYTHROCYTE [DISTWIDTH] IN BLOOD BY AUTOMATED COUNT: 12.9 % (ref 10–15)
HCT VFR BLD AUTO: 45.6 % (ref 35–47)
HGB BLD-MCNC: 14.9 G/DL (ref 11.7–15.7)
IMM GRANULOCYTES # BLD: 0 10E3/UL
IMM GRANULOCYTES NFR BLD: 0 %
INR PPP: 2.4 (ref 0.85–1.15)
LYMPHOCYTES # BLD AUTO: 2.3 10E3/UL (ref 0.8–5.3)
LYMPHOCYTES NFR BLD AUTO: 34 %
MCH RBC QN AUTO: 30.2 PG (ref 26.5–33)
MCHC RBC AUTO-ENTMCNC: 32.7 G/DL (ref 31.5–36.5)
MCV RBC AUTO: 92 FL (ref 78–100)
MONOCYTES # BLD AUTO: 0.6 10E3/UL (ref 0–1.3)
MONOCYTES NFR BLD AUTO: 9 %
NEUTROPHILS # BLD AUTO: 3.8 10E3/UL (ref 1.6–8.3)
NEUTROPHILS NFR BLD AUTO: 56 %
PLATELET # BLD AUTO: 201 10E3/UL (ref 150–450)
RBC # BLD AUTO: 4.94 10E6/UL (ref 3.8–5.2)
WBC # BLD AUTO: 6.7 10E3/UL (ref 4–11)

## 2021-09-08 PROCEDURE — 36415 COLL VENOUS BLD VENIPUNCTURE: CPT | Performed by: INTERNAL MEDICINE

## 2021-09-08 PROCEDURE — 85610 PROTHROMBIN TIME: CPT | Performed by: INTERNAL MEDICINE

## 2021-09-08 PROCEDURE — 99214 OFFICE O/P EST MOD 30 MIN: CPT | Performed by: INTERNAL MEDICINE

## 2021-09-08 PROCEDURE — 85025 COMPLETE CBC W/AUTO DIFF WBC: CPT | Performed by: INTERNAL MEDICINE

## 2021-09-08 ASSESSMENT — MIFFLIN-ST. JEOR: SCORE: 1075.91

## 2021-09-08 ASSESSMENT — PAIN SCALES - GENERAL: PAINLEVEL: NO PAIN (0)

## 2021-09-08 NOTE — PROGRESS NOTES
[address] [address]  Phone: [phone]  Primary Provider: Drake Lucas      PREOPERATIVE EVALUATION:  Today's date: 9/8/2021    Jenn Joseph is a 76 year old female who presents for a preoperative evaluation.    Surgical Information:  Surgery/Procedure: Cataract surgery   Surgery Location: [facility]  Surgeon: Dr. Kyle  Surgery Date: 9/13/2021 (left eye) and 9/27/2021 (right eye)  Time of Surgery: 9:30 AM  Where patient plans to recover: At home with family  Fax number for surgical facility: [phone]    HPI related to upcoming procedure:            This is a 76 year old female who comes in today for a preoperative evaluation. Mrs. Joseph is scheduled to undergo cataract surgery on the left eye on September 13, 2021 and cataract surgery on the right eye on September 27, 2021.      Preop Questions 9/8/2021   1. Have you ever had a heart attack or stroke? No   2. Have you ever had surgery on your heart or blood vessels, such as a stent placement, a coronary artery bypass, or surgery on an artery in your head, neck, heart, or legs? No   3. Do you have chest pain with activity? No   4. Do you have a history of  heart failure? No   5. Do you currently have a cold, bronchitis or symptoms of other infection? No   6. Do you have a cough, shortness of breath, or wheezing? No   7. Do you or anyone in your family have previous history of blood clots? YES    8. Do you or does anyone in your family have a serious bleeding problem such as prolonged bleeding following surgeries or cuts? No   9. Have you ever had problems with anemia or been told to take iron pills? No   10. Have you had any abnormal blood loss such as black, tarry or bloody stools, or abnormal vaginal bleeding? No   11. Have you ever had a blood transfusion? No   12. Are you willing to have a blood transfusion if it is medically needed before, during, or after  your surgery? Yes   13. Have you or any of your relatives ever had problems with anesthesia? No   14. Do you have sleep apnea, excessive snoring or daytime drowsiness? No   15. Do you have any artifical heart valves or other implanted medical devices like a pacemaker, defibrillator, or continuous glucose monitor? No   16. Do you have artificial joints? No   17. Are you allergic to latex? No       Health Care Directive:  Patient does not have a Health Care Directive or Living Will: Yes    Preoperative Review of :   reviewed - no record of controlled substances prescribed.    CHRONIC CONDITIONS    Atrial fibrillation - Anticoagulated with Warfarin. Controlled with Propafenone. No significant adverse effects from Propafenone.    Hyperlipidemia - Currently on Pravastatin without complaints of statin myopathy.    Hypertension- currently on Atenolol 12.5 mg once daily and Lisinopril 5 mg once daily.    Review of Systems  CONSTITUTIONAL: NEGATIVE for fever, chills, change in weight  INTEGUMENTARY/SKIN: NEGATIVE for worrisome rashes, moles or lesions  ENT/MOUTH: NEGATIVE for ear, mouth and throat problems  RESP: NEGATIVE for significant cough or SOB  CV: NEGATIVE for chest pain, palpitations or peripheral edema  GI: NEGATIVE for nausea, abdominal pain, heartburn, or change in bowel habits  : NEGATIVE for frequency, dysuria, or hematuria  MUSCULOSKELETAL: NEGATIVE for significant arthralgias or myalgia  NEURO: NEGATIVE for weakness, dizziness or paresthesias  ENDOCRINE: NEGATIVE for temperature intolerance, skin/hair changes  HEME: NEGATIVE for bleeding problems  PSYCHIATRIC: NEGATIVE for changes in mood or affect    Patient Active Problem List    Diagnosis Date Noted     Chronic atrial fibrillation (H) 02/17/2021     Priority: Medium     Atrial fibrillation, unspecified type (H) 03/24/2020     Priority: Medium     Pure hypercholesterolemia 10/17/2017     Priority: Medium     Cystocele with uterine prolapse  11/05/2015     Priority: Medium     Uterine prolapse 10/26/2015     Priority: Medium     Paroxysmal atrial fibrillation (H) 10/26/2015     Priority: Medium     Seborrhea 04/10/2013     Priority: Medium     Eyelid dermatitis, eczematous 04/10/2013     Priority: Medium     Health Care Home 12/18/2012     Priority: Medium     Michela Miller, RN-PHN  [facility]   [phone]    DX V65.8 REPLACED WITH [id number] (04/08/2013)       CKD (chronic kidney disease) stage 3, GFR 30-59 ml/min 10/11/2012     Priority: Medium     Essential hypertension, benign 02/08/2012     Priority: Medium     Hyperlipidemia 09/07/2011     Priority: Medium     Formatting of this note might be different from the original.  See results review for lipid profile.       Advanced directives, counseling/discussion 06/23/2011     Priority: Medium     Advance Directive Problem List Overview:   Name Relationship Phone    Primary Health Care Agent José Luis Joseph spouse [phone] cell [phone]         Alternative Health Care Agent Mirtha Finley daughter [phone]  Cell [phone]   Work [phone]   Alternative Health Care Agent Eligio Joseph son                          [phone], cell [phone]  Work [phone].    Reviewed Health Care Directive dated 09/20/2011.  Anika Titus CMA             Long-term (current) use of anticoagulants, INR goal 2.0-3.0 05/10/2011     Priority: Medium     Hypertension goal BP (blood pressure) < 140/90 01/19/2011     Priority: Medium     CARDIOVASCULAR SCREENING; LDL GOAL LESS THAN 130 10/31/2010     Priority: Medium     GERD (gastroesophageal reflux disease) 11/13/2009     Priority: Medium      Past Medical History:   Diagnosis Date     Atrial fibrillations      CKD (chronic kidney disease) stage 3, GFR 30-59 ml/min 10/11/2012     Eczema      Fx lunate, wrist-closed 1/09    RIGHT     GERD (gastroesophageal reflux disease)      HTN  (hypertension)      Long-term (current) use of anticoagulants, INR goal 2.0-3.0 5/10/2011     Pulmonary nodule 6/09    RECHECK Q 6 MO     Past Surgical History:   Procedure Laterality Date     D & C  80'S     HYSTERECTOMY  11-5-15     ROTATOR CUFF REPAIR RT/LT  2007    LEFT     TUBAL LIGATION  80'S     Current Outpatient Medications   Medication Sig Dispense Refill     atenolol (TENORMIN) 25 MG tablet Take 0.5 tablets (12.5 mg) by mouth daily 45 tablet 3     Calcium Carbonate Antacid (TUMS PO) Take  by mouth. prn       Calcium-Magnesium-Zinc 333-133-5 MG TABS Take by mouth 2 times daily        Cholecalciferol (VITAMIN D) 1000 UNITS capsule Take 1 capsule by mouth daily.       Coenzyme Q10 (CO Q-10) 200 MG CAPS Take 200 mg by mouth daily 90 capsule 3     desonide (DESOWEN) 0.05 % ointment Apply small amount twice daily to the eyelids for up to two weeks at a time. Take caution to not get it in the eyes. (Patient taking differently: as needed Apply small amount twice daily to the eyelids for up to two weeks at a time. Take caution to not get it in the eyes.) 15 g 3     famotidine (PEPCID) 20 MG tablet Take 1 tablet (20 mg) by mouth 2 times daily 180 tablet 3     FLUBLOK QUADRIVALENT 0.5 ML injection        latanoprost (XALATAN) 0.005 % ophthalmic solution instill 1 drop into both eyes once daily  0     lisinopril (ZESTRIL) 5 MG tablet Take 1 tablet (5 mg) by mouth daily 90 tablet 3     meclizine (ANTIVERT) 25 MG tablet Take 1 tablet (25 mg) by mouth 3 times daily as needed for dizziness 30 tablet 11     Multiple Vitamins-Minerals (MULTIVITAL PO) Once a day       order for DME Equipment being ordered: SP boot walker. 1 each 0     pravastatin (PRAVACHOL) 40 MG tablet Take 1 tablet (40 mg) by mouth daily 90 tablet 3     probiotic CAPS 2 times weekly       propafenone (RYTHMOL SR) 425 MG 12 hr capsule Take 1 capsule (425 mg) by mouth 2 times daily 180 capsule 3     warfarin ANTICOAGULANT (COUMADIN) 1 MG tablet TAKE 2  "TABLETS (2 MG) BY MOUTH ONCE DAILY ON MONDAYS, WEDNESDAYS, AND FRIDAYS. TAKE 1 TABLET (1MG) BY MOUTH ONCE DAILY ALL OTHER DAYS OF THE WEEK OR PER DIRECTIONS OFINR NURSE 120 tablet 11       Allergies   Allergen Reactions     Diflucan [Fluconazole] Rash     NOT EXACTLY SURE     Lamisil Rash     Terbinafine Rash        Social History     Tobacco Use     Smoking status: Former Smoker     Smokeless tobacco: Never Used   Substance Use Topics     Alcohol use: Yes     Comment: 1 - 2 PER WEEK       History   Drug Use No         Objective   BP (!) 173/82 (BP Location: Left arm, Patient Position: Sitting, Cuff Size: Adult Regular)   Pulse 63   Temp 97.1  F (36.2  C) (Tympanic)   Ht 1.581 m (5' 2.25\")   Wt 62.9 kg (138 lb 9.6 oz)   SpO2 96%   BMI 25.15 kg/m    Physical Exam    GENERAL APPEARANCE: healthy, alert and no distress     EYES: Eyes grossly normal to inspection     HENT: normal cephalic/atraumatic     RESP: lungs clear to auscultation - no rales, rhonchi or wheezes     CV: regular rates and rhythm, normal S1 S2, no S3 or S4 and no murmur, click or rub     MS: No edema.     SKIN: no suspicious lesions or rashes     NEURO: Normal strength and tone, sensory exam grossly normal, mentation intact and speech normal     PSYCH: mentation appears normal. and affect normal/bright      Diagnostics:   No EKG required for low risk surgery (cataract, skin procedure, breast biopsy, etc).  WBC Count 4.0 - 11.0 10e3/uL 6.7      RBC Count 3.80 - 5.20 10e6/uL 4.94     Hemoglobin 11.7 - 15.7 g/dL 14.9     Hematocrit 35.0 - 47.0 % 45.6     MCV 78 - 100 fL 92     MCH 26.5 - 33.0 pg 30.2     MCHC 31.5 - 36.5 g/dL 32.7     RDW 10.0 - 15.0 % 12.9     Platelet Count 150 - 450 10e3/uL 201     % Neutrophils % 56     % Lymphocytes % 34     % Monocytes % 9     % Eosinophils % 1     % Basophils % 1     % Immature Granulocytes % 0     Absolute Neutrophils 1.6 - 8.3 10e3/uL 3.8     Absolute Lymphocytes 0.8 - 5.3 10e3/uL 2.3     Absolute " Monocytes 0.0 - 1.3 10e3/uL 0.6     Absolute Eosinophils 0.0 - 0.7 10e3/uL 0.1     Absolute Basophils 0.0 - 0.2 10e3/uL 0.0     Absolute Immature Granulocytes <=0.0 10e3/uL 0.0    Resulting Agency  [facility]         Specimen Collected: 09/08/21 11:38 AM Last Resulted: 09/08/21 11:46 AM        INR 0.85 - 1.15 2.40High      Comment: Effective 7/11/2021, the reference range for this assay has changed.   Resulting Agency  [facility]         Specimen Collected: 09/08/21 11:38 AM Last Resulted: 09/08/21 11:40 AM          ASSESSMENT/PLAN    1) Preop general physical exam (primary diagnosis)  Revised Cardiac Risk Index (RCRI):  The patient has the following serious cardiovascular risks for perioperative complications:   - No serious cardiac risks = 0 points     RCRI Interpretation: 0 points: Class I (very low risk - 0.4% complication rate)    2) Chronic anticoagulation  Currently on Warfarin due to atrial fibrillation. No reason to hold Warfarin.  - INR    3) Hypertension  Blood pressures are usually well-controlled at home, per patient.  Continue Atenolol and Lisinopril at the same doses.         Signed Electronically by: Drake Lucas MD  Copy of this evaluation report is provided to requesting physician.

## 2021-09-08 NOTE — PATIENT INSTRUCTIONS
At [facility], we strive to deliver an exceptional experience to you, every time we see you. If you receive a survey, please complete it as we do value your feedback.  If you have MyChart, you can expect to receive results automatically within 24 hours of their completion.  Your provider will send a note interpreting your results as well.   If you do not have MyChart, you should receive your results in about a week by mail.    Your care team:                            Family Medicine Internal Medicine   MD Drake Denise MD Shantel Branch-Fleming, MD Srinivasa Vaka, MD Katya Belousova, PAIVANNA Law, APRN CNP    Reji Musa, MD Pediatrics   Sunny Jin, PAIVANNA Echeverria, CNP MD Marcela Juarez APRN CNP   MD Anupama West MD Deborah Mielke, MD Mirtha Barney, APRN [facility]      Clinic hours: Monday - Thursday 7 am-6 pm; Fridays 7 am-5 pm.   Urgent care: Monday - Friday 10 am- 8 pm; Saturday and Sunday 9 am-5 pm.    Clinic: [phone]       [facility]: Monday - Thursday 8 am - 7 pm; Friday 8 am - 6 pm  [facility]: [phone]     Use [email or web address] for 24/7 diagnosis and treatment of dozens of conditions.  Preparing for Your Surgery  Getting started  A nurse will call you to review your health history and instructions. They will give you an arrival time based on your scheduled surgery time.  Please be ready to share the following:    Your doctor's clinic name and phone number    Your medical, surgical and anesthesia history    A list of allergies and sensitivities    A list of medicines, including herbal treatments and over-the-counter drugs    Whether the patient has a legal guardian (ask how to send us the papers in advance)  If you have a child who's having surgery, please ask for a copy of Preparing for Your Child's Surgery.    Preparing for  surgery    Within 30 days of surgery: Have a pre-op exam (sometimes called an H&P, or History and Physical). This can be done at a clinic or pre-operative center.  ? If you're having a , you may not need this exam. Talk to your care team    At your pre-op exam, talk to your care team about all medicines you take. If you need to stop any medicines before surgery, ask when to start taking them again.  ? We do this for your safety. Many medicines can make you bleed too much during surgery. Some change how well surgery (anesthesia) drugs work.    Call your insurance company to let them know you're having surgery. (If you don't have insurance, call [phone].)    Call your clinic if there's any change in your health. This includes signs of a cold or flu (sore throat, runny nose, cough, rash, fever). It also includes a scrape or scratch near the surgery site.    If you have questions on the day of surgery, call your hospital or surgery center.  Eating and drinking guidelines  For your safety: Unless your surgeon tells you otherwise, follow the guidelines below.    Eat and drink as usual until 8 hours before surgery. After that, no food or milk.    Drink clear liquids until 2 hours before surgery. These are liquids you can see through, like water, Gatorade and Propel Water. You may also have black coffee and tea (no cream or milk).    Nothing by mouth within 2 hours of surgery. This includes gum, candy and breath mints.    If you drink, stop drinking alcohol the night before surgery.    If your care team tells you to take medicine on the morning of surgery, it's okay to take it with a sip of water.  Preventing infection    Shower or bathe the night before and morning of your surgery. Follow the instructions your clinic gave you. (If no instructions, use regular soap.)    Don't shave or clip hair near your surgery site. We'll remove the hair if needed.    Don't smoke or vape the morning of surgery. You may chew  nicotine gum up to 2 hours before surgery. A nicotine patch is okay.  ? Note: Some surgeries require you to completely quit smoking and nicotine. Check with your surgeon.    Your care team will make every effort to keep you safe from infection. We will:  ? Clean our hands often with soap and water (or an alcohol-based hand rub).  ? Clean the skin at your surgery site with a special soap that kills germs.  ? Give you a special gown to keep you warm. (Cold raises the risk of infection.)  ? Wear special hair covers, masks, gowns and gloves during surgery.  ? Give antibiotic medicine, if prescribed. Not all surgeries need antibiotics.  What to bring on the day of surgery    Photo ID and insurance card    Copy of your health care directive, if you have one    Glasses and hearing aides (bring cases)  ? You can't wear contacts during surgery    Inhaler and eye drops, if you use them (tell us about these when you arrive)    CPAP machine or breathing device, if you use them    A few personal items, if spending the night    If you have . . .  ? A pacemaker or ICD (cardiac defibrillator): Bring the ID card.  ? An implanted stimulator: Bring the remote control.  ? A legal guardian: Bring a copy of the certified (court-stamped) guardianship papers.  Please remove any jewelry, including body piercings. Leave jewelry and other valuables at home.  If you're going home the day of surgery  Important: If you don't follow the rules below, we must cancel your surgery.     Arrange for someone to drive you home after surgery. You may not drive, take a taxi or take public transportation by yourself (unless you'll have local anesthesia only).    Arrange for a responsible adult to stay with you overnight. If you don't, we may keep you in the hospital overnight, and you may need to pay the costs yourself.  Questions?   If you have any questions for your care team, list them here:  _________________________________________________________________________________________________________________________________________________________________________________________________________________________________________________________________________________________________________________________  For informational purposes only. Not to replace the advice of your health care provider. Copyright   2003, 2019 [facility]. All rights reserved. Clinically reviewed by Sylvia Mcgowan MD. SMARTworks 687042 - REV 4/20.      PATIENT INSTRUCTIONS:    1. Please obtain information regarding the ff:      A) Location of surgery.      B) Time of surgery      C) Name of ophthalmologist      D) Fax number of surgical center.

## 2021-09-08 NOTE — PROGRESS NOTES
Anticoagulation Management    Unable to reach Jenn today.    Today's INR result of 2.4 is therapeutic (goal INR of 2.0-3.0).  Result received from: Clinic Lab    Follow up required to confirm warfarin dose taken and assess for changes    LMTCB    Per OV note today patient is having cataract sx 9/13 (left) and 9/27 (right). Typically does not require holding but will need to get eye doctors clinic name/number and discuss with [facility].    Anticoagulation clinic to follow up    Gwen Gandara RN

## 2021-09-09 ENCOUNTER — TELEPHONE (OUTPATIENT)
Dept: FAMILY MEDICINE | Facility: CLINIC | Age: 76
End: 2021-09-09

## 2021-09-09 NOTE — TELEPHONE ENCOUNTER
Patient needs you to fax to [facility] ([place])  at fax #   [phone]    Pre-op form    Time of surgery mon 9/13 at 9:30am  Surgeon name Dr Kyle    Patient not quite sure if this will happened due to eye infection but please send them her info

## 2021-09-09 NOTE — PROGRESS NOTES
Called Jenn. She currently has an eye infectionPer OV note today patient is having cataract sx 9/13 (left) and 9/27 (right). Typically does not require holding but will need to get eye doctors clinic name/number and discuss with [facility]. Patient does not have this information at this time. She has a follow up appt tomorrow to assess infection and assess if upcoming surgery can still be performed.     SISSY REBOLLEDO RN-BC, [facility] 9/9/2021 at 10:55 AM   Anticoagulation Clinic  [phone]

## 2021-09-26 ENCOUNTER — HEALTH MAINTENANCE LETTER (OUTPATIENT)
Age: 76
End: 2021-09-26

## 2021-09-29 ENCOUNTER — LAB (OUTPATIENT)
Dept: LAB | Facility: CLINIC | Age: 76
End: 2021-09-29
Payer: COMMERCIAL

## 2021-09-29 ENCOUNTER — ANTICOAGULATION THERAPY VISIT (OUTPATIENT)
Dept: ANTICOAGULATION | Facility: CLINIC | Age: 76
End: 2021-09-29

## 2021-09-29 DIAGNOSIS — I48.20 CHRONIC ATRIAL FIBRILLATION (H): ICD-10-CM

## 2021-09-29 DIAGNOSIS — I48.91 ATRIAL FIBRILLATION, UNSPECIFIED TYPE (H): Primary | ICD-10-CM

## 2021-09-29 DIAGNOSIS — I48.91 ATRIAL FIBRILLATION, UNSPECIFIED TYPE (H): ICD-10-CM

## 2021-09-29 LAB — INR BLD: 2.5 (ref 0.9–1.1)

## 2021-09-29 PROCEDURE — 36416 COLLJ CAPILLARY BLOOD SPEC: CPT

## 2021-09-29 PROCEDURE — 85610 PROTHROMBIN TIME: CPT

## 2021-09-29 NOTE — PROGRESS NOTES
ANTICOAGULATION MANAGEMENT     Jenn Joseph 76 year old female is on warfarin with therapeutic INR result. (Goal INR 2.0-3.0)    Recent labs: (last 7 days)     09/29/21  0957   INR 2.5*       ASSESSMENT     Source(s): Chart Review and Patient/Caregiver Call       Warfarin doses taken: Warfarin taken as instructed    Diet: No new diet changes identified    New illness, injury, or hospitalization: No    Medication/supplement changes: None noted    Signs or symptoms of bleeding or clotting: No    Previous INR: Therapeutic last 2(+) visits    Additional findings: Had cataract surgery in left eye, they have not completed the right eye yet due to an ongoing infection in eye that needs to be cleared before they can complete the surgery.      PLAN     Recommended plan for no diet, medication or health factor changes affecting INR     Dosing Instructions: Continue your current warfarin dose with next INR in 6 weeks       Summary  As of 9/29/2021    Full warfarin instructions:  2 mg every Mon, Wed, Fri; 1 mg all other days   Next INR check:  11/10/2021             Telephone call with Jenn who verbalizes understanding and agrees to plan    Lab visit scheduled    Education provided: Goal range and significance of current result and Importance of therapeutic range    Plan made per [facility] anticoagulation protocol    Erik Fermin RN  Anticoagulation Clinic  9/29/2021    _______________________________________________________________________     Anticoagulation Episode Summary     Current INR goal:  2.0-3.0   TTR:  85.6 % (1 y)   Target end date:  Indefinite   Send INR reminders to:  ELIZABETH CROUCH    Indications    Long-term (current) use of anticoagulants [Z79.01] (Resolved) [Z79.01]  Atrial fibrillation/flutter (Resolved)  Atrial fibrillation  unspecified type (H) [I48.91]  Chronic atrial fibrillation (H) [I48.20]           Comments:  every 6 weeks  Referral renewed 2/17/21         Anticoagulation Care Providers      Provider Role Specialty Phone number    Drake Lucas MD Referring Internal Medicine [phone]

## 2021-10-21 ENCOUNTER — TELEPHONE (OUTPATIENT)
Dept: FAMILY MEDICINE | Facility: CLINIC | Age: 76
End: 2021-10-21

## 2021-10-21 NOTE — TELEPHONE ENCOUNTER
Patient calling to see if she needs another pre op physical for the 2nd surgery, 9/27/21 was canceled due to eye infection and rescheduled for 11/1/2021. Please advise.  Sunshine Matson [facility]  2nd Floor  Primary Care

## 2021-10-28 ENCOUNTER — OFFICE VISIT (OUTPATIENT)
Dept: FAMILY MEDICINE | Facility: CLINIC | Age: 76
End: 2021-10-28
Payer: COMMERCIAL

## 2021-10-28 VITALS
HEIGHT: 62 IN | HEART RATE: 70 BPM | OXYGEN SATURATION: 96 % | BODY MASS INDEX: 24.11 KG/M2 | SYSTOLIC BLOOD PRESSURE: 122 MMHG | WEIGHT: 131 LBS | TEMPERATURE: 97.8 F | DIASTOLIC BLOOD PRESSURE: 82 MMHG

## 2021-10-28 DIAGNOSIS — I10 HYPERTENSION GOAL BP (BLOOD PRESSURE) < 140/90: ICD-10-CM

## 2021-10-28 DIAGNOSIS — Z01.818 PREOP GENERAL PHYSICAL EXAM: Primary | ICD-10-CM

## 2021-10-28 DIAGNOSIS — K21.9 GASTROESOPHAGEAL REFLUX DISEASE WITHOUT ESOPHAGITIS: ICD-10-CM

## 2021-10-28 DIAGNOSIS — R23.0 TOE CYANOSIS: ICD-10-CM

## 2021-10-28 DIAGNOSIS — E78.5 HYPERLIPIDEMIA LDL GOAL <100: ICD-10-CM

## 2021-10-28 LAB
ALT SERPL W P-5'-P-CCNC: 33 U/L (ref 0–50)
CHOLEST SERPL-MCNC: 193 MG/DL
CREAT SERPL-MCNC: 0.93 MG/DL (ref 0.52–1.04)
CREAT UR-MCNC: 37 MG/DL
FASTING STATUS PATIENT QL REPORTED: NO
GFR SERPL CREATININE-BSD FRML MDRD: 60 ML/MIN/1.73M2
HDLC SERPL-MCNC: 65 MG/DL
HGB BLD-MCNC: 14.9 G/DL (ref 11.7–15.7)
LDLC SERPL CALC-MCNC: 97 MG/DL
MICROALBUMIN UR-MCNC: 6 MG/L
MICROALBUMIN/CREAT UR: 16.22 MG/G CR (ref 0–25)
NONHDLC SERPL-MCNC: 128 MG/DL
PLATELET # BLD AUTO: 210 10E3/UL (ref 150–450)
POTASSIUM BLD-SCNC: 3.9 MMOL/L (ref 3.4–5.3)
TRIGL SERPL-MCNC: 153 MG/DL

## 2021-10-28 PROCEDURE — 82043 UR ALBUMIN QUANTITATIVE: CPT | Performed by: INTERNAL MEDICINE

## 2021-10-28 PROCEDURE — 85018 HEMOGLOBIN: CPT | Performed by: INTERNAL MEDICINE

## 2021-10-28 PROCEDURE — 80061 LIPID PANEL: CPT | Performed by: INTERNAL MEDICINE

## 2021-10-28 PROCEDURE — 99214 OFFICE O/P EST MOD 30 MIN: CPT | Performed by: INTERNAL MEDICINE

## 2021-10-28 PROCEDURE — 85049 AUTOMATED PLATELET COUNT: CPT | Performed by: INTERNAL MEDICINE

## 2021-10-28 PROCEDURE — 84132 ASSAY OF SERUM POTASSIUM: CPT | Performed by: INTERNAL MEDICINE

## 2021-10-28 PROCEDURE — 82565 ASSAY OF CREATININE: CPT | Performed by: INTERNAL MEDICINE

## 2021-10-28 PROCEDURE — 36415 COLL VENOUS BLD VENIPUNCTURE: CPT | Performed by: INTERNAL MEDICINE

## 2021-10-28 PROCEDURE — 84460 ALANINE AMINO (ALT) (SGPT): CPT | Performed by: INTERNAL MEDICINE

## 2021-10-28 RX ORDER — ATENOLOL 25 MG/1
12.5 TABLET ORAL DAILY
Qty: 45 TABLET | Refills: 3 | Status: SHIPPED | OUTPATIENT
Start: 2021-10-28 | End: 2022-04-12

## 2021-10-28 RX ORDER — VALACYCLOVIR HYDROCHLORIDE 1 G/1
1000 TABLET, FILM COATED ORAL 3 TIMES DAILY
COMMUNITY
Start: 2021-09-30 | End: 2022-03-09

## 2021-10-28 RX ORDER — PROPAFENONE HYDROCHLORIDE 425 MG/1
425 CAPSULE, EXTENDED RELEASE ORAL 2 TIMES DAILY
Qty: 180 CAPSULE | Refills: 3 | Status: SHIPPED | OUTPATIENT
Start: 2021-10-28 | End: 2022-04-12

## 2021-10-28 RX ORDER — FAMOTIDINE 20 MG/1
20 TABLET, FILM COATED ORAL 2 TIMES DAILY
Qty: 180 TABLET | Refills: 3 | Status: SHIPPED | OUTPATIENT
Start: 2021-10-28 | End: 2022-04-12

## 2021-10-28 RX ORDER — LISINOPRIL 5 MG/1
5 TABLET ORAL DAILY
Qty: 90 TABLET | Refills: 3 | Status: SHIPPED | OUTPATIENT
Start: 2021-10-28 | End: 2022-04-12

## 2021-10-28 ASSESSMENT — MIFFLIN-ST. JEOR: SCORE: 1041.43

## 2021-10-28 NOTE — PROGRESS NOTES
[address] [address]  Phone: [phone]  Primary Provider: Drake Lucas  Pre-op Performing Provider: DRAKE LUCAS      PREOPERATIVE EVALUATION:  Today's date: 10/28/2021    Jenn Joseph is a 76 year old female who presents for a preoperative evaluation.    Surgical Information:  Surgery/Procedure: Right Eye Cataract  Surgery Location: [facility]  Surgeon: Dr. Kyle  Surgery Date: 11/01/2021  Time of Surgery: 830am  Where patient plans to recover: At home with family  Fax number for surgical facility: [phone]    Type of Anesthesia Anticipated: to be determined    HPI related to upcoming procedure:            This is a 76 year old female with paroxysmal atrial fibrillation, anticoagulated with Warfarin, who comes in today for a preoperative evaluation. Mrs. Joseph is scheduled to undergo cataract surgery on the left eye on November 1, 2021.      Preop Questions 10/28/2021   1. Have you ever had a heart attack or stroke? No   2. Have you ever had surgery on your heart or blood vessels, such as a stent placement, a coronary artery bypass, or surgery on an artery in your head, neck, heart, or legs? No   3. Do you have chest pain with activity? No   4. Do you have a history of  heart failure? No   5. Do you currently have a cold, bronchitis or symptoms of other infection? No   6. Do you have a cough, shortness of breath, or wheezing? No   7. Do you or anyone in your family have previous history of blood clots? YES    8. Do you or does anyone in your family have a serious bleeding problem such as prolonged bleeding following surgeries or cuts? No   9. Have you ever had problems with anemia or been told to take iron pills? No   10. Have you had any abnormal blood loss such as black, tarry or bloody stools, or abnormal vaginal bleeding? No   11. Have you ever had a blood transfusion? No   12. Are you willing to  have a blood transfusion if it is medically needed before, during, or after your surgery? Yes   13. Have you or any of your relatives ever had problems with anesthesia? No   14. Do you have sleep apnea, excessive snoring or daytime drowsiness? No   15. Do you have any artifical heart valves or other implanted medical devices like a pacemaker, defibrillator, or continuous glucose monitor? No   16. Do you have artificial joints? No   17. Are you allergic to latex? No       Health Care Directive:  Patient does not have a Health Care Directive or Living Will: Yes.    Preoperative Review of :   reviewed - no record of controlled substances prescribed.    Hypertension -     BP readings at home shows the following:  January 2021, 122/72, high of 139/82 57  Feb 2021 126/74, high of 136/82, 57  March 2021 127/72, high of 149/83, 58  April 2021 130/76 high of 144/89, 57  May 2021 136/79, high of  153/93, 53  June 2021 129/75, high of 150/83, 59  July 130/75, high of 159/86, 60  August 2021 132/77, high of 147/89, 59  September 2021  SBP ranges 109-142, DBP ranges from 68-87, pulse 53-66  October 2021 -144, DBP 70-81, pulse 55-67    Review of Systems  CONSTITUTIONAL: NEGATIVE for fever, chills, change in weight  INTEGUMENTARY/SKIN: NEGATIVE for worrisome rashes, moles or lesions  EYES: As above.  ENT/MOUTH: NEGATIVE for ear, mouth and throat problems  RESP: NEGATIVE for significant cough or SOB  CV: NEGATIVE for chest pain, palpitations or peripheral edema  GI: NEGATIVE for nausea, abdominal pain, heartburn, or change in bowel habits  : NEGATIVE for frequency, dysuria, or hematuria  MUSCULOSKELETAL: NEGATIVE for significant arthralgias or myalgia  NEURO: NEGATIVE for weakness, dizziness or paresthesias  ENDOCRINE: NEGATIVE for temperature intolerance, skin/hair changes  HEME: NEGATIVE for bleeding problems  PSYCHIATRIC: NEGATIVE for changes in mood or affect    Patient Active Problem List    Diagnosis Date Noted      Chronic atrial fibrillation (H) 02/17/2021     Priority: Medium     Atrial fibrillation, unspecified type (H) 03/24/2020     Priority: Medium     Pure hypercholesterolemia 10/17/2017     Priority: Medium     Cystocele with uterine prolapse 11/05/2015     Priority: Medium     Uterine prolapse 10/26/2015     Priority: Medium     Paroxysmal atrial fibrillation (H) 10/26/2015     Priority: Medium     Seborrhea 04/10/2013     Priority: Medium     Eyelid dermatitis, eczematous 04/10/2013     Priority: Medium     Health Care Home 12/18/2012     Priority: Medium     Michela Miller RN-PHN  FPA / [facility]   [phone]    DX V65.8 REPLACED WITH 07031 HEALTH CARE HOME (04/08/2013)       CKD (chronic kidney disease) stage 3, GFR 30-59 ml/min (H) 10/11/2012     Priority: Medium     Essential hypertension, benign 02/08/2012     Priority: Medium     Hyperlipidemia 09/07/2011     Priority: Medium     Formatting of this note might be different from the original.  See results review for lipid profile.       Advanced directives, counseling/discussion 06/23/2011     Priority: Medium     Advance Directive Problem List Overview:   Name Relationship Phone    Primary Health Care Agent José Luis Joseph spouse [phone] cell [phone]         Alternative Health Care Agent Mirtha Joseph Tushar daughter [phone]  Cell [phone]   Work [phone]   Alternative Health Care Agent Eligio Joseph son                          [phone], cell [phone]  Work [phone].    Reviewed Health Care Directive dated 09/20/2011.  Anika Titus CMA             Long-term (current) use of anticoagulants, INR goal 2.0-3.0 05/10/2011     Priority: Medium     Hypertension goal BP (blood pressure) < 140/90 01/19/2011     Priority: Medium     CARDIOVASCULAR SCREENING; LDL GOAL LESS THAN 130 10/31/2010     Priority: Medium     GERD (gastroesophageal reflux disease) 11/13/2009     Priority: Medium       Past Medical History:   Diagnosis Date     Atrial fibrillations      CKD (chronic kidney disease) stage 3, GFR 30-59 ml/min 10/11/2012     Eczema      Fx lunate, wrist-closed 1/09    RIGHT     GERD (gastroesophageal reflux disease)      HTN (hypertension)      Long-term (current) use of anticoagulants, INR goal 2.0-3.0 5/10/2011     Pulmonary nodule 6/09    RECHECK Q 6 MO     Past Surgical History:   Procedure Laterality Date     D & C  80'S     HYSTERECTOMY  11-5-15     ROTATOR CUFF REPAIR RT/LT  2007    LEFT     TUBAL LIGATION  80'S     Current Outpatient Medications   Medication Sig Dispense Refill     atenolol (TENORMIN) 25 MG tablet Take 0.5 tablets (12.5 mg) by mouth daily 45 tablet 3     Calcium Carbonate Antacid (TUMS PO) Take  by mouth. prn       Calcium-Magnesium-Zinc 333-133-5 MG TABS Take by mouth 2 times daily        Cholecalciferol (VITAMIN D) 1000 UNITS capsule Take 1 capsule by mouth daily.       Coenzyme Q10 (CO Q-10) 200 MG CAPS Take 200 mg by mouth daily 90 capsule 3     desonide (DESOWEN) 0.05 % ointment Apply small amount twice daily to the eyelids for up to two weeks at a time. Take caution to not get it in the eyes. (Patient taking differently: as needed Apply small amount twice daily to the eyelids for up to two weeks at a time. Take caution to not get it in the eyes.) 15 g 3     famotidine (PEPCID) 20 MG tablet Take 1 tablet (20 mg) by mouth 2 times daily 180 tablet 3     FLUBLOK QUADRIVALENT 0.5 ML injection        latanoprost (XALATAN) 0.005 % ophthalmic solution instill 1 drop into both eyes once daily  0     lisinopril (ZESTRIL) 5 MG tablet Take 1 tablet (5 mg) by mouth daily 90 tablet 3     meclizine (ANTIVERT) 25 MG tablet Take 1 tablet (25 mg) by mouth 3 times daily as needed for dizziness 30 tablet 11     Multiple Vitamins-Minerals (MULTIVITAL PO) Once a day       order for DME Equipment being ordered: SP boot walker. 1 each 0     pravastatin (PRAVACHOL) 40 MG tablet Take 1  "tablet (40 mg) by mouth daily 90 tablet 3     probiotic CAPS 2 times weekly       propafenone (RYTHMOL SR) 425 MG 12 hr capsule Take 1 capsule (425 mg) by mouth 2 times daily 180 capsule 3     warfarin ANTICOAGULANT (COUMADIN) 1 MG tablet TAKE 2 TABLETS (2 MG) BY MOUTH ONCE DAILY ON MONDAYS, WEDNESDAYS, AND FRIDAYS. TAKE 1 TABLET (1MG) BY MOUTH ONCE DAILY ALL OTHER DAYS OF THE WEEK OR PER DIRECTIONS OFINR NURSE 120 tablet 11       Allergies   Allergen Reactions     Diflucan [Fluconazole] Rash     NOT EXACTLY SURE     Lamisil Rash     Terbinafine Rash        Social History     Tobacco Use     Smoking status: Former Smoker     Smokeless tobacco: Never Used   Substance Use Topics     Alcohol use: Yes     Comment: 1 - 2 PER WEEK       History   Drug Use No         Objective   /82   Pulse 70   Temp 97.8  F (36.6  C) (Tympanic)   Ht 1.581 m (5' 2.25\")   Wt 59.4 kg (131 lb)   SpO2 96%   BMI 23.77 kg/m    Physical Exam    GENERAL APPEARANCE: healthy, alert and no distress     EYES: Eyes grossly normal to inspection     HENT: normal cephalic/atraumatic     RESP: lungs clear to auscultation - no rales, rhonchi or wheezes     CV: regular rates and rhythm, normal S1 S2, no S3 or S4 and no murmur, click or rub     SKIN: no suspicious lesions or rashes     NEURO: Normal strength and tone, sensory exam grossly normal, mentation intact and speech normal     PSYCH: mentation appears normal. and affect normal/bright     ASSESSMENT/PLAN  Preop general physical exam    Diagnostics:   No EKG required for low risk surgery (cataract, skin procedure, breast biopsy, etc).    Revised Cardiac Risk Index (RCRI):  The patient has the following serious cardiovascular risks for perioperative complications:   - No serious cardiac risks = 0 points     RCRI Interpretation: 0 points: Class I (very low risk - 0.4% complication rate)      - Platelet count  - ALT  - Hemoglobin  - Creatinine  - Potassium    Hyperlipidemia LDL goal <100  - " Lipid panel reflex to direct LDL Non-fasting    Hypertension goal BP (blood pressure) < 140/90  - Albumin Random Urine Quantitative with Creat Ratio  - lisinopril (ZESTRIL) 5 MG tablet; Take 1 tablet (5 mg) by mouth daily  - atenolol (TENORMIN) 25 MG tablet; Take 0.5 tablets (12.5 mg) by mouth daily  - propafenone (RYTHMOL SR) 425 MG 12 hr capsule; Take 1 capsule (425 mg) by mouth 2 times daily    Toe cyanosis  - US Lower Extremity Arterial Duplex Bilateral; Future    Gastroesophageal reflux disease without esophagitis  - famotidine (PEPCID) 20 MG tablet; Take 1 tablet (20 mg) by mouth 2 times daily    Signed Electronically by: Drake Lucas MD  Copy of this evaluation report is provided to requesting physician.

## 2021-10-28 NOTE — PATIENT INSTRUCTIONS
Preparing for Your Surgery  Getting started  A nurse will call you to review your health history and instructions. They will give you an arrival time based on your scheduled surgery time.  Please be ready to share the following:    Your doctor's clinic name and phone number    Your medical, surgical and anesthesia history    A list of allergies and sensitivities    A list of medicines, including herbal treatments and over-the-counter drugs    Whether the patient has a legal guardian (ask how to send us the papers in advance)  If you have a child who's having surgery, please ask for a copy of Preparing for Your Child's Surgery.    Preparing for surgery    Within 30 days of surgery: Have a pre-op exam (sometimes called an H&P, or History and Physical). This can be done at a clinic or pre-operative center.  ? If you're having a , you may not need this exam. Talk to your care team    At your pre-op exam, talk to your care team about all medicines you take. If you need to stop any medicines before surgery, ask when to start taking them again.  ? We do this for your safety. Many medicines can make you bleed too much during surgery. Some change how well surgery (anesthesia) drugs work.    Call your insurance company to let them know you're having surgery. (If you don't have insurance, call [phone].)    Call your clinic if there's any change in your health. This includes signs of a cold or flu (sore throat, runny nose, cough, rash, fever). It also includes a scrape or scratch near the surgery site.    If you have questions on the day of surgery, call your hospital or surgery center.  Eating and drinking guidelines  For your safety: Unless your surgeon tells you otherwise, follow the guidelines below.    Eat and drink as usual until 8 hours before surgery. After that, no food or milk.    Drink clear liquids until 2 hours before surgery. These are liquids you can see through, like water, Gatorade and Propel  Water. You may also have black coffee and tea (no cream or milk).    Nothing by mouth within 2 hours of surgery. This includes gum, candy and breath mints.    If you drink, stop drinking alcohol the night before surgery.    If your care team tells you to take medicine on the morning of surgery, it's okay to take it with a sip of water.  Preventing infection    Shower or bathe the night before and morning of your surgery. Follow the instructions your clinic gave you. (If no instructions, use regular soap.)    Don't shave or clip hair near your surgery site. We'll remove the hair if needed.    Don't smoke or vape the morning of surgery. You may chew nicotine gum up to 2 hours before surgery. A nicotine patch is okay.  ? Note: Some surgeries require you to completely quit smoking and nicotine. Check with your surgeon.    Your care team will make every effort to keep you safe from infection. We will:  ? Clean our hands often with soap and water (or an alcohol-based hand rub).  ? Clean the skin at your surgery site with a special soap that kills germs.  ? Give you a special gown to keep you warm. (Cold raises the risk of infection.)  ? Wear special hair covers, masks, gowns and gloves during surgery.  ? Give antibiotic medicine, if prescribed. Not all surgeries need antibiotics.  What to bring on the day of surgery    Photo ID and insurance card    Copy of your health care directive, if you have one    Glasses and hearing aides (bring cases)  ? You can't wear contacts during surgery    Inhaler and eye drops, if you use them (tell us about these when you arrive)    CPAP machine or breathing device, if you use them    A few personal items, if spending the night    If you have . . .  ? A pacemaker or ICD (cardiac defibrillator): Bring the ID card.  ? An implanted stimulator: Bring the remote control.  ? A legal guardian: Bring a copy of the certified (court-stamped) guardianship papers.  Please remove any jewelry, including  body piercings. Leave jewelry and other valuables at home.  If you're going home the day of surgery  Important: If you don't follow the rules below, we must cancel your surgery.     Arrange for someone to drive you home after surgery. You may not drive, take a taxi or take public transportation by yourself (unless you'll have local anesthesia only).    Arrange for a responsible adult to stay with you overnight. If you don't, we may keep you in the hospital overnight, and you may need to pay the costs yourself.  Questions?   If you have any questions for your care team, list them here: _________________________________________________________________________________________________________________________________________________________________________________________________________________________________________________________________________________________________________________________  For informational purposes only. Not to replace the advice of your health care provider. Copyright   2003, 2019 [facility]. All rights reserved. Clinically reviewed by Sylvia Mcgowan MD. SMARTworks 043667 - REV 4/20.  At [facility], we strive to deliver an exceptional experience to you, every time we see you. If you receive a survey, please complete it as we do value your feedback.  If you have MyChart, you can expect to receive results automatically within 24 hours of their completion.  Your provider will send a note interpreting your results as well.   If you do not have MyChart, you should receive your results in about a week by mail.    Your care team:                            Family Medicine Internal Medicine   MD Drake Denise MD Shantel Branch-Fleming, MD Srinivasa Vaka, MD Katya Belousova, PAJuliaC  Estrella Law, APRN HARJIT Musa MD Pediatrics   Sunny Jin, PAIVANNA Echeverria, MD Marcela Arnold APRN CNP    MD Anupama West MD Deborah Mielke, MD Kim Thein, APRN [facility]      Clinic hours: Monday - Thursday 7 am-6 pm; Fridays 7 am-5 pm.   Urgent care: Monday - Friday 10 am- 8 pm; Saturday and Sunday 9 am-5 pm.    Clinic: [phone]       [facility]: Monday - Thursday 8 am - 7 pm; Friday 8 am - 6 pm  [facility]: [phone]     Use www.[email or web address].org for 24/7 diagnosis and treatment of dozens of conditions.

## 2021-11-04 ENCOUNTER — ANCILLARY PROCEDURE (OUTPATIENT)
Dept: ULTRASOUND IMAGING | Facility: CLINIC | Age: 76
End: 2021-11-04
Attending: INTERNAL MEDICINE
Payer: COMMERCIAL

## 2021-11-04 DIAGNOSIS — I75.022 ATHEROEMBOLISM OF LEFT LOWER EXTREMITY (H): ICD-10-CM

## 2021-11-04 PROCEDURE — 93922 UPR/L XTREMITY ART 2 LEVELS: CPT | Mod: GC | Performed by: RADIOLOGY

## 2021-11-04 PROCEDURE — 93925 LOWER EXTREMITY STUDY: CPT | Mod: GC | Performed by: RADIOLOGY

## 2021-11-11 ENCOUNTER — LAB (OUTPATIENT)
Dept: LAB | Facility: CLINIC | Age: 76
End: 2021-11-11
Payer: COMMERCIAL

## 2021-11-11 ENCOUNTER — ANTICOAGULATION THERAPY VISIT (OUTPATIENT)
Dept: ANTICOAGULATION | Facility: CLINIC | Age: 76
End: 2021-11-11

## 2021-11-11 ENCOUNTER — TELEPHONE (OUTPATIENT)
Dept: FAMILY MEDICINE | Facility: CLINIC | Age: 76
End: 2021-11-11

## 2021-11-11 DIAGNOSIS — I48.91 ATRIAL FIBRILLATION, UNSPECIFIED TYPE (H): ICD-10-CM

## 2021-11-11 DIAGNOSIS — I48.20 CHRONIC ATRIAL FIBRILLATION (H): ICD-10-CM

## 2021-11-11 DIAGNOSIS — I48.91 ATRIAL FIBRILLATION, UNSPECIFIED TYPE (H): Primary | ICD-10-CM

## 2021-11-11 LAB — INR BLD: 2 (ref 0.9–1.1)

## 2021-11-11 PROCEDURE — 36416 COLLJ CAPILLARY BLOOD SPEC: CPT

## 2021-11-11 PROCEDURE — 85610 PROTHROMBIN TIME: CPT

## 2021-11-11 NOTE — TELEPHONE ENCOUNTER
Patient asking for call with results of her imaging done on 11/4/21. Reports she has not seen any results or heard anything.  Thank you. Irma Fry RN

## 2021-11-11 NOTE — PROGRESS NOTES
ANTICOAGULATION MANAGEMENT     Jenn Joseph 76 year old female is on warfarin with therapeutic INR result. (Goal INR 2.0-3.0)    Recent labs: (last 7 days)     11/11/21  1424   INR 2.0*       ASSESSMENT     Source(s): Chart Review and Patient/Caregiver Call       Warfarin doses taken: Warfarin taken as instructed    Diet: No new diet changes identified    New illness, injury, or hospitalization: No    Medication/supplement changes: None noted    Signs or symptoms of bleeding or clotting: No    Previous INR: Therapeutic last 2(+) visits    Additional findings: recent cataract surgery went well     PLAN     Recommended plan for no diet, medication or health factor changes affecting INR     Dosing Instructions: Continue your current warfarin dose with next INR in 6 weeks       Summary  As of 11/11/2021    Full warfarin instructions:  2 mg every Mon, Wed, Fri; 1 mg all other days   Next INR check:  12/22/2021             Telephone call with Jenn who verbalizes understanding and agrees to plan    Lab visit scheduled    Education provided: Please call back if any changes to your diet, medications or how you've been taking warfarin and Contact [phone]  with any changes, questions or concerns.     Plan made per [facility] anticoagulation protocol    Irma Fry, RN  Anticoagulation Clinic  11/11/2021    _______________________________________________________________________     Anticoagulation Episode Summary     Current INR goal:  2.0-3.0   TTR:  85.6 % (1 y)   Target end date:  Indefinite   Send INR reminders to:  ELIZABETH CROUCH    Indications    Long-term (current) use of anticoagulants [Z79.01] (Resolved) [Z79.01]  Atrial fibrillation/flutter (Resolved)  Atrial fibrillation  unspecified type (H) [I48.91]  Chronic atrial fibrillation (H) [I48.20]           Comments:  every 6 weeks  Referral renewed 2/17/21         Anticoagulation Care Providers     Provider Role Specialty Phone number    Vocal,  Drake Lam MD [facility] Internal Medicine [phone]

## 2021-11-26 DIAGNOSIS — E78.2 MIXED HYPERLIPIDEMIA: ICD-10-CM

## 2021-11-29 RX ORDER — PRAVASTATIN SODIUM 40 MG
40 TABLET ORAL DAILY
Qty: 90 TABLET | Refills: 2 | Status: SHIPPED | OUTPATIENT
Start: 2021-11-29 | End: 2022-04-12

## 2021-11-29 NOTE — TELEPHONE ENCOUNTER
Prescription approved per [facility] Refill Protocol.    Valorie Santos RN  [facility]

## 2021-12-06 ENCOUNTER — VIRTUAL VISIT (OUTPATIENT)
Dept: FAMILY MEDICINE | Facility: CLINIC | Age: 76
End: 2021-12-06
Payer: COMMERCIAL

## 2021-12-06 DIAGNOSIS — M54.16 LUMBAR BACK PAIN WITH RADICULOPATHY AFFECTING LEFT LOWER EXTREMITY: Primary | ICD-10-CM

## 2021-12-06 DIAGNOSIS — I48.0 PAROXYSMAL ATRIAL FIBRILLATION (H): ICD-10-CM

## 2021-12-06 PROBLEM — I48.20 CHRONIC ATRIAL FIBRILLATION (H): Status: RESOLVED | Noted: 2021-02-17 | Resolved: 2021-12-06

## 2021-12-06 PROCEDURE — 99442 PR PHYSICIAN TELEPHONE EVALUATION 11-20 MIN: CPT | Mod: 95 | Performed by: INTERNAL MEDICINE

## 2021-12-06 RX ORDER — GABAPENTIN 100 MG/1
100 CAPSULE ORAL 3 TIMES DAILY PRN
Qty: 90 CAPSULE | Refills: 5 | Status: SHIPPED | OUTPATIENT
Start: 2021-12-06 | End: 2022-04-12

## 2021-12-06 NOTE — PROGRESS NOTES
Jenn is a 76 year old who is being evaluated via a billable telephone visit.      What phone number would you like to be contacted at? [phone]  How would you like to obtain your AVS? Anton Barajas is a 76 year old female with atrial fibrillation, currently anticoagulated with Warfarin,  who presents for the following health issues     Back pain issues since 1983.  But this is the worst time.  When I used heat on my back years ago, it only made it worse.  Now I am using cold packs.  Lower back, midline and lately to the right x 5 days, almost constant.  Left hip comes and goes.  Didn't do anything different five days ago.  I've had lumbar disc degeneration and I do morning stretches.  I'm on back and stretching my knees and pull up my back, then I turn over and do my middle back.  The only thing different is that I'm doing strengthening exercises via Zoom with weights.  No x-rays other than chiropractic office years ago.      REVIEW OF SYSTEMS  CONSTITUTIONAL: NEGATIVE for fever, chills, change in weight  INTEGUMENTARY/SKIN: NEGATIVE for worrisome rashes, moles or lesions  EYES: NEGATIVE for vision changes or irritation  ENT/MOUTH: NEGATIVE for ear, mouth and throat problems  RESP: NEGATIVE for significant cough or SOB  BREAST: NEGATIVE for masses, tenderness or discharge  CV: POSITIVE for atrial fibrillation, currently anticoagulated with Warfarin.  GI: NEGATIVE for nausea, abdominal pain, heartburn, or change in bowel habits  : NEGATIVE for frequency, dysuria, or hematuria  MUSCULOSKELETAL:As above.  NEURO: NEGATIVE for weakness, dizziness or paresthesias  ENDOCRINE: NEGATIVE for temperature intolerance, skin/hair changes  HEME: NEGATIVE for bleeding problems  PSYCHIATRIC: NEGATIVE for changes in mood or affect      OBJECTIVE   Vitals:  No vitals were obtained today due to virtual visit.  Physical Exam   Remainder of exam unable to be completed due to telephone  visits    DIAGNOSTICS  None    ASSESSMENT/PLAN   Lumbar back pain with radiculopathy affecting left lower extremity  Triggered by strengthening exercises, in the setting of chronic lumbar disc degeneration. Avoid NSAIDs due to Warfarin use. Defer oral steroids due to risk of compression fractures (unless ruled out by x-rays). Start Gabapentin cautiously. Apply alternating heat/cold. Take Tylenol PRN. Refer to physical therapy. Consult Ortho spine, depending on MRI findings.  - XR Lumbar Spine 2/3 Views; Future  - MR Lumbar Spine w/o Contrast; Future  - Physical Therapy Referral; Future  - gabapentin (NEURONTIN) 100 MG capsule; Take 1 capsule (100 mg) by mouth 3 times daily as needed for neuropathic pain    Paroxysmal atrial fibrillation (H)  Controlled with combined Propafenone and Atenolol.  Anticoagulated with Warfarin.      Phone call duration: 18 minutes  Start: 9:30 AM  End: 9:48 AM    Disposition:  Follow-up in 4 weeks.    Drake Lucas MD  Internal Medicine

## 2021-12-07 ENCOUNTER — ANCILLARY PROCEDURE (OUTPATIENT)
Dept: GENERAL RADIOLOGY | Facility: CLINIC | Age: 76
End: 2021-12-07
Payer: COMMERCIAL

## 2021-12-07 DIAGNOSIS — M54.16 LUMBAR BACK PAIN WITH RADICULOPATHY AFFECTING LEFT LOWER EXTREMITY: ICD-10-CM

## 2021-12-07 PROCEDURE — 72100 X-RAY EXAM L-S SPINE 2/3 VWS: CPT | Performed by: RADIOLOGY

## 2021-12-17 ENCOUNTER — ANCILLARY PROCEDURE (OUTPATIENT)
Dept: MRI IMAGING | Facility: CLINIC | Age: 76
End: 2021-12-17
Attending: INTERNAL MEDICINE
Payer: COMMERCIAL

## 2021-12-17 DIAGNOSIS — M54.16 LUMBAR BACK PAIN WITH RADICULOPATHY AFFECTING LEFT LOWER EXTREMITY: ICD-10-CM

## 2021-12-17 PROCEDURE — 72148 MRI LUMBAR SPINE W/O DYE: CPT | Performed by: RADIOLOGY

## 2021-12-20 ENCOUNTER — THERAPY VISIT (OUTPATIENT)
Dept: PHYSICAL THERAPY | Facility: CLINIC | Age: 76
End: 2021-12-20
Attending: INTERNAL MEDICINE
Payer: COMMERCIAL

## 2021-12-20 DIAGNOSIS — M54.16 LUMBAR BACK PAIN WITH RADICULOPATHY AFFECTING LEFT LOWER EXTREMITY: ICD-10-CM

## 2021-12-20 PROCEDURE — 97161 PT EVAL LOW COMPLEX 20 MIN: CPT | Mod: GP | Performed by: PHYSICAL THERAPIST

## 2021-12-20 PROCEDURE — 97530 THERAPEUTIC ACTIVITIES: CPT | Mod: GP | Performed by: PHYSICAL THERAPIST

## 2021-12-20 PROCEDURE — 97110 THERAPEUTIC EXERCISES: CPT | Mod: GP | Performed by: PHYSICAL THERAPIST

## 2021-12-20 NOTE — PROGRESS NOTES
12/20/21 0500   Body Part   Goals listed below are for Low back   Goal #1   Goal #1 sitting   Current Functional Level Minutes patient can sit   Performance level 60 7/10   STG Target Performance Minutes patient will be able to sit   Performance level 60 4/10 PL   Rationale to allow rest from standing;for community transportation   Due date 01/17/22   LTG Target Performance Minutes patient will be able to sit   Performance Level 60 0/10 PL   Rationale to allow rest from standing;for community transportation   Due date 02/14/22                                                                              [facility]    OUTPATIENT Physical Therapy ORTHOPEDIC EVALUATION  PLAN OF TREATMENT FOR OUTPATIENT REHABILITATION  (COMPLETE FOR INITIAL CLAIMS ONLY)  Patient's Last Name, First Name, M.I.  YOB: 1945  Jenn Joseph    Provider s Name:  [facility]   Medical Record No.  [id number]   Start of Care Date:  12/20/21   Onset Date:   11/08/21   Type:     _X__PT   ___OT Medical Diagnosis:    Encounter Diagnosis   Name Primary?     Lumbar back pain with radiculopathy affecting left lower extremity         Treatment Diagnosis:  Low back/left leg        Goals:      Therapy Frequency:  1x/week  Predicted Duration of Therapy Intervention:  8 weeks    Kim Velasquez, PT                 I CERTIFY THE NEED FOR THESE SERVICES FURNISHED UNDER        THIS PLAN OF TREATMENT AND WHILE UNDER MY CARE     (Physician co-signature of this document indicates review and certification of the therapy plan).                       Certification Date From:  12/20/21   Certification Date To:  03/19/22    Referring Provider:  Drake Lam Vocal    Initial Assessment        See Epic Evaluation SOC Date: 12/20/21

## 2021-12-20 NOTE — PROGRESS NOTES
Physical Therapy Initial Evaluation  Subjective:  The history is provided by the patient. No  was used.   Patient Health History  Jenn Joseph being seen for Low back with left leg.     Problem began: 11/8/2021.   Problem occurred: unknown   Pain is reported as 7/10 on pain scale.  General health as reported by patient is good.  Pertinent medical history includes: high blood pressure, migraines/headaches, numbness/tingling and heart problems.   Red flags:  None as reported by patient.  Medical allergies: none.       Current medications:  High blood pressure medication, pain medication, cardiac medication and heparin/coumadin.    Current occupation is Retired.                     Therapist Generated HPI Evaluation  Problem details: Low back pain has been present for years.  Over the past few years when her back pain is present there is increased left leg pain.  Has been completing chiropractic exercises in the AM but it is not long lasting.  .         Type of problem:  Lumbar.    This is a recurrent condition.  Condition occurred with:  Insidious onset.  Where condition occurred: for unknown reasons.  Patient reports pain:  Lower lumbar spine.  Pain is described as aching and sharp and is constant.  Pain radiates to:  Gluteals left, thigh left and lower leg left. Pain is the same all the time.    Symptoms are exacerbated by bending, lifting, standing and sitting  and relieved by analgesics.  Special tests included:  MRI and x-ray.  Previous treatment includes chiropractic. There was mild improvement following previous treatment.  Barriers include:  None as reported by patient.                        Objective:  System         Lumbar/SI Evaluation    Lumbar Myotomes:    T12-L3 (Hip Flex):  Left: 4+    Right: 5  L2-4 (Quads):  Left:  5    Right:  5  L4 (Ankle DF):  Left:  5    Right:  5            Neural Tension/Mobility:      Right side:   Slump positive.                                                          [place] Lumbar Evaluation      Movement Loss:  Flexion (Flex): nil and pain  Extension (EXT): mod and pain  Side Glide R (SG R): min, mod and pain  Side Glide L (SG L): mod, min and pain      Conclusion: derangement                                         ROS    Assessment/Plan:    Patient is a 76 year old female with lumbar complaints.    Patient has the following significant findings with corresponding treatment plan.                Diagnosis 1:  Low back with left leg pain  Pain -  electric stimulation, manual therapy, self management, education and directional preference exercise  Decreased ROM/flexibility - manual therapy and therapeutic exercise  Decreased strength - therapeutic exercise and therapeutic activities  Impaired muscle performance - neuro re-education  Decreased function - therapeutic activities    Therapy Evaluation Codes:   1) History comprised of:   Personal factors that impact the plan of care:      None.    Comorbidity factors that impact the plan of care are:      High blood pressure.     Medications impacting care: Pain.  2) Examination of Body Systems comprised of:   Body structures and functions that impact the plan of care:      Lumbar spine.   Activity limitations that impact the plan of care are:      Sitting.  3) Clinical presentation characteristics are:   Stable/Uncomplicated.  4) Decision-Making    Low complexity using standardized patient assessment instrument and/or measureable assessment of functional outcome.  Cumulative Therapy Evaluation is: Low complexity.    Previous and current functional limitations:  (See Goal Flow Sheet for this information)    Short term and Long term goals: (See Goal Flow Sheet for this information)     Communication ability:  Patient appears to be able to clearly communicate and understand verbal and written communication and follow directions correctly.  Treatment Explanation - The following has been discussed with the patient:    RX ordered/plan of care  Anticipated outcomes  Possible risks and side effects  This patient would benefit from PT intervention to resume normal activities.   Rehab potential is good.    Frequency:  1 X week, once daily  Duration:  for 8 weeks  Discharge Plan:  Achieve all LTG.  Independent in home treatment program.  Reach maximal therapeutic benefit.    Please refer to the daily flowsheet for treatment today, total treatment time and time spent performing 1:1 timed codes.

## 2021-12-22 ENCOUNTER — LAB (OUTPATIENT)
Dept: LAB | Facility: CLINIC | Age: 76
End: 2021-12-22
Payer: COMMERCIAL

## 2021-12-22 ENCOUNTER — ANTICOAGULATION THERAPY VISIT (OUTPATIENT)
Dept: ANTICOAGULATION | Facility: CLINIC | Age: 76
End: 2021-12-22

## 2021-12-22 DIAGNOSIS — I48.91 ATRIAL FIBRILLATION, UNSPECIFIED TYPE (H): Primary | ICD-10-CM

## 2021-12-22 DIAGNOSIS — I48.20 CHRONIC ATRIAL FIBRILLATION (H): ICD-10-CM

## 2021-12-22 DIAGNOSIS — I48.91 ATRIAL FIBRILLATION, UNSPECIFIED TYPE (H): ICD-10-CM

## 2021-12-22 LAB — INR BLD: 3.4 (ref 0.9–1.1)

## 2021-12-22 PROCEDURE — 85610 PROTHROMBIN TIME: CPT

## 2021-12-22 PROCEDURE — 36415 COLL VENOUS BLD VENIPUNCTURE: CPT

## 2021-12-22 NOTE — PROGRESS NOTES
ANTICOAGULATION MANAGEMENT     Jenn TRAMMELL Jake 76 year old female is on warfarin with supratherapeutic INR result. (Goal INR 2.0-3.0)    Recent labs: (last 7 days)     12/22/21  1147   INR 3.4*       ASSESSMENT     Source(s): Chart Review and Patient/Caregiver Call       Warfarin doses taken: Warfarin taken as instructed    Diet: Decreased greens/vitamin K in diet; plans to resume previous intake    New illness, injury, or hospitalization: Yes: recent cataract surgery and back issues    Medication/supplement changes: None noted taking a little more tylenol than usual    Signs or symptoms of bleeding or clotting: No    Previous INR: Therapeutic last 2(+) visits    Additional findings: None     PLAN     Recommended plan for temporary change(s) affecting INR     Dosing Instructions: Hold dose then continue your current warfarin dose with next INR in 2 weeks       Summary  As of 12/22/2021    Full warfarin instructions:  12/22: Hold; Otherwise 2 mg every Mon, Wed, Fri; 1 mg all other days   Next INR check:  1/5/2022             Telephone call with Jenn who verbalizes understanding and agrees to plan    Lab visit scheduled    Education provided: Please call back if any changes to your diet, medications or how you've been taking warfarin and Contact [phone]  with any changes, questions or concerns.     Plan made per [facility] anticoagulation protocol    Irma Fry RN  Anticoagulation Clinic  12/22/2021    _______________________________________________________________________     Anticoagulation Episode Summary     Current INR goal:  2.0-3.0   TTR:  84.2 % (1 y)   Target end date:  Indefinite   Send INR reminders to:  ELIZABETH CROUCH    Indications    Long-term (current) use of anticoagulants [Z79.01] (Resolved) [Z79.01]  Atrial fibrillation/flutter (Resolved)  Atrial fibrillation  unspecified type (H) [I48.91]  Chronic atrial fibrillation (H) (Resolved) [I48.20]           Comments:  every 6 weeks   Referral renewed 2/17/21         Anticoagulation Care Providers     Provider Role Specialty Phone number    Drake Lucas MD Referring Internal Medicine [phone]

## 2022-01-03 ENCOUNTER — THERAPY VISIT (OUTPATIENT)
Dept: PHYSICAL THERAPY | Facility: CLINIC | Age: 77
End: 2022-01-03
Payer: COMMERCIAL

## 2022-01-03 DIAGNOSIS — M54.42 BILATERAL LOW BACK PAIN WITH LEFT-SIDED SCIATICA, UNSPECIFIED CHRONICITY: ICD-10-CM

## 2022-01-03 PROCEDURE — 97110 THERAPEUTIC EXERCISES: CPT | Mod: GP | Performed by: PHYSICAL THERAPIST

## 2022-01-03 PROCEDURE — 97140 MANUAL THERAPY 1/> REGIONS: CPT | Mod: GP | Performed by: PHYSICAL THERAPIST

## 2022-01-05 DIAGNOSIS — Z79.01 LONG-TERM (CURRENT) USE OF ANTICOAGULANTS, INR GOAL 2.0-3.0: ICD-10-CM

## 2022-01-05 DIAGNOSIS — I48.20 CHRONIC ATRIAL FIBRILLATION (H): ICD-10-CM

## 2022-01-07 ENCOUNTER — TELEPHONE (OUTPATIENT)
Dept: FAMILY MEDICINE | Facility: CLINIC | Age: 77
End: 2022-01-07

## 2022-01-07 ENCOUNTER — ANTICOAGULATION THERAPY VISIT (OUTPATIENT)
Dept: ANTICOAGULATION | Facility: CLINIC | Age: 77
End: 2022-01-07

## 2022-01-07 RX ORDER — WARFARIN SODIUM 1 MG/1
TABLET ORAL
Qty: 120 TABLET | Refills: 0 | Status: SHIPPED | OUTPATIENT
Start: 2022-01-07 | End: 2022-03-30

## 2022-01-07 NOTE — TELEPHONE ENCOUNTER
Prescription approved per [facility] Refill Protocol.  Brian Ortega Rn  [facility]

## 2022-01-07 NOTE — TELEPHONE ENCOUNTER
Patient calling to reschedule INR apt. Her  has covid and they are quarantined . She is well with no symptoms.   INR lab apt rescheduled for one week and she is advised to call back if any concerns or changes. Irma Fry RN

## 2022-01-14 ENCOUNTER — LAB (OUTPATIENT)
Dept: LAB | Facility: CLINIC | Age: 77
End: 2022-01-14
Payer: COMMERCIAL

## 2022-01-14 ENCOUNTER — ANTICOAGULATION THERAPY VISIT (OUTPATIENT)
Dept: ANTICOAGULATION | Facility: CLINIC | Age: 77
End: 2022-01-14

## 2022-01-14 DIAGNOSIS — I48.91 ATRIAL FIBRILLATION, UNSPECIFIED TYPE (H): ICD-10-CM

## 2022-01-14 DIAGNOSIS — I48.20 CHRONIC ATRIAL FIBRILLATION (H): ICD-10-CM

## 2022-01-14 DIAGNOSIS — I48.91 ATRIAL FIBRILLATION, UNSPECIFIED TYPE (H): Primary | ICD-10-CM

## 2022-01-14 LAB — INR BLD: 2.6 (ref 0.9–1.1)

## 2022-01-14 PROCEDURE — 36416 COLLJ CAPILLARY BLOOD SPEC: CPT

## 2022-01-14 PROCEDURE — 85610 PROTHROMBIN TIME: CPT

## 2022-01-14 NOTE — PROGRESS NOTES
ANTICOAGULATION MANAGEMENT     Jenn Joseph 76 year old female is on warfarin with therapeutic INR result. (Goal INR 2.0-3.0)    Recent labs: (last 7 days)     01/14/22  1016   INR 2.6*       ASSESSMENT     Source(s): Chart Review and Patient/Caregiver Call       Warfarin doses taken: Warfarin taken as instructed    Diet: No new diet changes identified    New illness, injury, or hospitalization: Yes: some cold symptoms and using cough drops and tylenol at night, symptoms improving.     Medication/supplement changes: None noted    Signs or symptoms of bleeding or clotting: No    Previous INR: Supratherapeutic    Additional findings: None     PLAN     Recommended plan for no diet, medication or health factor changes affecting INR     Dosing Instructions: Continue your current warfarin dose with next INR in 4 weeks       Summary  As of 1/14/2022    Full warfarin instructions:  2 mg every Mon, Wed, Fri; 1 mg all other days   Next INR check:  2/22/2022             Telephone call with Jenn who verbalizes understanding and agrees to plan but declined apt in 4 wks and requested 5 wks.     Lab visit scheduled    Education provided: Please call back if any changes to your diet, medications or how you've been taking warfarin, Importance of notifying clinic for changes in medications; a sooner lab recheck maybe needed. and Contact [phone]  with any changes, questions or concerns.     Plan made per [facility] anticoagulation protocol    Irma Fry RN  Anticoagulation Clinic  1/14/2022    _______________________________________________________________________     Anticoagulation Episode Summary     Current INR goal:  2.0-3.0   TTR:  87.3 % (1 y)   Target end date:  Indefinite   Send INR reminders to:  ELIZABETH CROUCH    Indications    Long-term (current) use of anticoagulants [Z79.01] (Resolved) [Z79.01]  Atrial fibrillation/flutter (Resolved)  Atrial fibrillation  unspecified type (H) [I48.91]  Chronic  atrial fibrillation (H) (Resolved) [I48.20]           Comments:  every 6 weeks  Referral renewed 2/17/21         Anticoagulation Care Providers     Provider Role Specialty Phone number    Drake Lucas MD Referring Internal Medicine [phone]

## 2022-01-16 ENCOUNTER — HEALTH MAINTENANCE LETTER (OUTPATIENT)
Age: 77
End: 2022-01-16

## 2022-01-17 DIAGNOSIS — Z71.89 ADVICE GIVEN ABOUT COVID-19 VIRUS INFECTION: Primary | ICD-10-CM

## 2022-01-19 ENCOUNTER — THERAPY VISIT (OUTPATIENT)
Dept: PHYSICAL THERAPY | Facility: CLINIC | Age: 77
End: 2022-01-19
Payer: COMMERCIAL

## 2022-01-19 DIAGNOSIS — M54.42 BILATERAL LOW BACK PAIN WITH LEFT-SIDED SCIATICA, UNSPECIFIED CHRONICITY: ICD-10-CM

## 2022-01-19 PROCEDURE — 97140 MANUAL THERAPY 1/> REGIONS: CPT | Mod: CQ | Performed by: PHYSICAL THERAPY ASSISTANT

## 2022-01-19 PROCEDURE — 97110 THERAPEUTIC EXERCISES: CPT | Mod: CQ | Performed by: PHYSICAL THERAPY ASSISTANT

## 2022-01-20 NOTE — PROGRESS NOTES
ANTICOAGULATION FOLLOW-UP CLINIC VISIT    Patient Name:  Jenn Joseph  Date:  11/29/2017  Contact Type:  Face to Face    SUBJECTIVE:     Patient Findings     Positives No Problem Findings           OBJECTIVE    INR Protime   Date Value Ref Range Status   11/29/2017 2.9 (A) 0.86 - 1.14 Final       ASSESSMENT / PLAN  INR assessment THER    Recheck INR In: 6 WEEKS    INR Location Clinic      Anticoagulation Summary as of 11/29/2017     INR goal 2.0-3.0   Today's INR 2.9   Maintenance plan 1 mg (1 mg x 1) on Sun, Tue, Thu; 2 mg (1 mg x 2) all other days   Full instructions 1 mg on Sun, Tue, Thu; 2 mg all other days   Weekly total 11 mg   No change documented Devyn Nunes RN   Plan last modified Cheryl Dyer RN (4/13/2016)   Next INR check 1/10/2018   Target end date     Indications   Long-term (current) use of anticoagulants [Z79.01] (Resolved) [Z79.01]  Atrial fibrillation/flutter (Resolved)         Anticoagulation Episode Summary     INR check location     Preferred lab     Send INR reminders to [facility]    Comments       Anticoagulation Care Providers     Provider Role Specialty Phone number    J LuisNayeli ott PA-C Responsible Physician Assistant [phone]            See the Encounter Report to view Anticoagulation Flowsheet and Dosing Calendar (Go to Encounters tab in chart review, and find the Anticoagulation Therapy Visit)    Dosage adjustment made based on physician directed care plan.    Devyn Nunes, RN                Other (Free Text): Left inferior medial upper back- margins clear 06/21/2021 NER Render Risk Assessment In Note?: no Detail Level: Detailed Note Text (......Xxx Chief Complaint.): This diagnosis correlates with the

## 2022-01-25 ENCOUNTER — THERAPY VISIT (OUTPATIENT)
Dept: PHYSICAL THERAPY | Facility: CLINIC | Age: 77
End: 2022-01-25
Payer: COMMERCIAL

## 2022-01-25 DIAGNOSIS — M54.42 BILATERAL LOW BACK PAIN WITH LEFT-SIDED SCIATICA, UNSPECIFIED CHRONICITY: ICD-10-CM

## 2022-01-25 PROCEDURE — 97140 MANUAL THERAPY 1/> REGIONS: CPT | Mod: CQ | Performed by: PHYSICAL THERAPY ASSISTANT

## 2022-01-25 PROCEDURE — 97110 THERAPEUTIC EXERCISES: CPT | Mod: CQ | Performed by: PHYSICAL THERAPY ASSISTANT

## 2022-02-08 ENCOUNTER — THERAPY VISIT (OUTPATIENT)
Dept: PHYSICAL THERAPY | Facility: CLINIC | Age: 77
End: 2022-02-08
Payer: COMMERCIAL

## 2022-02-08 DIAGNOSIS — M54.42 BILATERAL LOW BACK PAIN WITH LEFT-SIDED SCIATICA, UNSPECIFIED CHRONICITY: ICD-10-CM

## 2022-02-08 PROCEDURE — 97110 THERAPEUTIC EXERCISES: CPT | Mod: CQ | Performed by: PHYSICAL THERAPY ASSISTANT

## 2022-02-08 PROCEDURE — 97140 MANUAL THERAPY 1/> REGIONS: CPT | Mod: CQ | Performed by: PHYSICAL THERAPY ASSISTANT

## 2022-02-23 ENCOUNTER — DOCUMENTATION ONLY (OUTPATIENT)
Dept: FAMILY MEDICINE | Facility: CLINIC | Age: 77
End: 2022-02-23

## 2022-02-23 ENCOUNTER — LAB (OUTPATIENT)
Dept: LAB | Facility: CLINIC | Age: 77
End: 2022-02-23
Payer: COMMERCIAL

## 2022-02-23 ENCOUNTER — ANTICOAGULATION THERAPY VISIT (OUTPATIENT)
Dept: ANTICOAGULATION | Facility: CLINIC | Age: 77
End: 2022-02-23

## 2022-02-23 DIAGNOSIS — I48.20 CHRONIC ATRIAL FIBRILLATION (H): ICD-10-CM

## 2022-02-23 DIAGNOSIS — I48.91 ATRIAL FIBRILLATION, UNSPECIFIED TYPE (H): Primary | ICD-10-CM

## 2022-02-23 DIAGNOSIS — I48.91 ATRIAL FIBRILLATION, UNSPECIFIED TYPE (H): ICD-10-CM

## 2022-02-23 LAB — INR BLD: 2 (ref 0.9–1.1)

## 2022-02-23 PROCEDURE — 85610 PROTHROMBIN TIME: CPT

## 2022-02-23 PROCEDURE — 36416 COLLJ CAPILLARY BLOOD SPEC: CPT

## 2022-02-23 NOTE — PROGRESS NOTES
ANTICOAGULATION MANAGEMENT      Jenn Joseph due for annual renewal of referral to anticoagulation monitoring. Order pended for your review and signature.      ANTICOAGULATION SUMMARY      Warfarin indication(s)     Atrial fibrillation    Heart valve present?  NO       Current goal range   INR: 2.0-3.0     Goal appropriate for indication? Yes, INR 2-3 appropriate for hx of DVT, PE, hypercoagulable state, Afib, LVAD, or bileaflet AVR without risk factors     Current duration of therapy Indefinite/long term therapy   Time in Therapeutic Range (TTR)  (Goal > 60%) 92%       Office visit with referring provider's group within last year yes on 12/6/21       Stella Yang RN

## 2022-02-23 NOTE — PROGRESS NOTES
ANTICOAGULATION MANAGEMENT     Jenn Joseph 76 year old female is on warfarin with therapeutic INR result. (Goal INR 2.0-3.0)    Recent labs: (last 7 days)     02/23/22  1141   INR 2.0*       ASSESSMENT     Source(s): Chart Review and Patient/Caregiver Call       Warfarin doses taken: Warfarin taken as instructed    Diet: No new diet changes identified    New illness, injury, or hospitalization: No    Medication/supplement changes: None noted    Signs or symptoms of bleeding or clotting: No    Previous INR: Therapeutic last 2(+) visits    Additional findings: None     PLAN     Recommended plan for no diet, medication or health factor changes affecting INR     Dosing Instructions: Continue your current warfarin dose with next INR in 5 weeks       Summary  As of 2/23/2022    Full warfarin instructions:  2 mg every Mon, Wed, Fri; 1 mg all other days   Next INR check:  3/30/2022             Telephone call with Jenn who verbalizes understanding and agrees to plan    Lab visit scheduled    Education provided: Monitoring for bleeding signs and symptoms and Monitoring for clotting signs and symptoms    Plan made per [facility] anticoagulation protocol    Stella Yang RN  Anticoagulation Clinic  2/23/2022    _______________________________________________________________________     Anticoagulation Episode Summary     Current INR goal:  2.0-3.0   TTR:  92.2 % (1 y)   Target end date:  Indefinite   Send INR reminders to:  EL[facility]AB[facility]OU[facility]    Indications    Long-term (current) use of anticoagulants [Z79.01] (Resolved) [Z79.01]  Atrial fibrillation/flutter (Resolved)  Atrial fibrillation  unspecified type (H) [I48.91]  Chronic atrial fibrillation (H) (Resolved) [I48.20]           Comments:  every 6 weeks  Referral renewed 2/17/21         Anticoagulation Care Providers     Provider Role Specialty Phone number    Drake Lucas MD Referring Internal Medicine [phone]

## 2022-03-01 ENCOUNTER — THERAPY VISIT (OUTPATIENT)
Dept: PHYSICAL THERAPY | Facility: CLINIC | Age: 77
End: 2022-03-01
Payer: COMMERCIAL

## 2022-03-01 DIAGNOSIS — M54.42 BILATERAL LOW BACK PAIN WITH LEFT-SIDED SCIATICA, UNSPECIFIED CHRONICITY: ICD-10-CM

## 2022-03-01 PROCEDURE — 97140 MANUAL THERAPY 1/> REGIONS: CPT | Mod: CQ | Performed by: PHYSICAL THERAPY ASSISTANT

## 2022-03-01 PROCEDURE — 97110 THERAPEUTIC EXERCISES: CPT | Mod: CQ | Performed by: PHYSICAL THERAPY ASSISTANT

## 2022-03-01 NOTE — PROGRESS NOTES
PROGRESS  REPORT    Progress reporting period is from 12/20/21 to 3/1/22.       SUBJECTIVE  Subjective changes noted by patient: Pt reports returning to PT today as her back had been good overall but is now having good and bad days since last visit. Did some errands yesterday with increased back pain reaching 7-8/10  PL. Today is a little better but did take Tylenol to help with the pain. At this time feels she has had a slight set back and having more back pain and feels she would benefit from continuing PT a few additional visits to see if she can get back on track.   Current Pain level:  (4-5/10).     Initial Pain level: 8/10.   Changes in function: Flare up with set back in progress towards goals. See Goal flowsheet attached for changes in current functional level.   Adverse reaction to treatment or activity: activity - running errands with increased walking    OBJECTIVE  Changes noted in objective findings:  Decreased Oswestry score. Maintaining control of radicular symptoms.     Objective: Current sym: low back; LROM FIS min loss with stretch; EIS mod loss  With shift to R; SG symmetrical and painfree     ASSESSMENT/PLAN  Updated problem list and treatment plan: Diagnosis 1:  Low back with left leg pain    Pain -  manual therapy, self management, education and home program  Decreased ROM/flexibility - manual therapy, therapeutic exercise and home program  Decreased strength - therapeutic exercise, therapeutic activities and home program  Decreased function - therapeutic activities and home program  STG/LTGs have been met or progress has been made towards goals:  Yes (See Goal flow sheet completed today.)  Assessment of Progress: The patient has had set backs in their progress.  Patient has had set back in meeting short term goals and is progressing towards long term goals.  Self Management Plans:  Patient has been instructed in a home treatment program.  Patient  has been instructed in self management of  symptoms.  I have discussed this patient with primary PT and find that the nature, scope, duration and intensity of the therapy is appropriate for the medical condition of the patient.  Jenn continues to require the following intervention to meet STG and LTG's:  PT    Recommendations:  This patient would benefit from continued therapy.     Frequency:  1 X week, once daily  Duration:  for 2 visits      The progress note/discharge summary was written in collaboration with and reviewed by the physical therapist.    Please refer to the daily flowsheet for treatment today, total treatment time and time spent performing 1:1 timed codes.

## 2022-03-09 ENCOUNTER — OFFICE VISIT (OUTPATIENT)
Dept: PODIATRY | Facility: CLINIC | Age: 77
End: 2022-03-09
Payer: COMMERCIAL

## 2022-03-09 VITALS
SYSTOLIC BLOOD PRESSURE: 118 MMHG | HEIGHT: 62 IN | HEART RATE: 86 BPM | BODY MASS INDEX: 23.96 KG/M2 | DIASTOLIC BLOOD PRESSURE: 76 MMHG

## 2022-03-09 DIAGNOSIS — M79.675 PAIN IN TOES OF BOTH FEET: ICD-10-CM

## 2022-03-09 DIAGNOSIS — M79.674 PAIN IN TOES OF BOTH FEET: ICD-10-CM

## 2022-03-09 DIAGNOSIS — B35.1 ONYCHOMYCOSIS: Primary | ICD-10-CM

## 2022-03-09 PROCEDURE — 99213 OFFICE O/P EST LOW 20 MIN: CPT | Performed by: PODIATRIST

## 2022-03-09 RX ORDER — CICLOPIROX 80 MG/ML
SOLUTION TOPICAL DAILY
Qty: 6 ML | Refills: 4 | Status: SHIPPED | OUTPATIENT
Start: 2022-03-09 | End: 2023-03-09

## 2022-03-09 NOTE — PROGRESS NOTES
Patient complains of thick nail on right and left first toe.  Has had this for several years .  It is slowly getting worse.  Has had pain in the past which is aggravated by activity and relieved by rest.  Describes as a burning pain.  Hard to walk in shoes now because of the pain. Tried over the counter medications without success.  Does not like the look of the nail and is wondering about options.  Denies drainage or erythema    ROS: See above         Allergies   Allergen Reactions     Diflucan [Fluconazole] Rash     NOT EXACTLY SURE     Lamisil Rash     Terbinafine Rash       Current Outpatient Medications   Medication Sig Dispense Refill     atenolol (TENORMIN) 25 MG tablet Take 0.5 tablets (12.5 mg) by mouth daily 45 tablet 3     Calcium-Magnesium-Zinc 333-133-5 MG TABS Take by mouth 2 times daily        Cholecalciferol (VITAMIN D) 1000 UNITS capsule Take 1 capsule by mouth daily.       ciclopirox (PENLAC) 8 % external solution Apply topically daily 6 mL 4     Coenzyme Q10 (CO Q-10) 200 MG CAPS Take 200 mg by mouth daily 90 capsule 3     desonide (DESOWEN) 0.05 % ointment Apply small amount twice daily to the eyelids for up to two weeks at a time. Take caution to not get it in the eyes. (Patient taking differently: as needed Apply small amount twice daily to the eyelids for up to two weeks at a time. Take caution to not get it in the eyes.) 15 g 3     famotidine (PEPCID) 20 MG tablet Take 1 tablet (20 mg) by mouth 2 times daily 180 tablet 3     lisinopril (ZESTRIL) 5 MG tablet Take 1 tablet (5 mg) by mouth daily 90 tablet 3     pravastatin (PRAVACHOL) 40 MG tablet Take 1 tablet (40 mg) by mouth daily 90 tablet 2     probiotic CAPS 2 times weekly       propafenone (RYTHMOL SR) 425 MG 12 hr capsule Take 1 capsule (425 mg) by mouth 2 times daily 180 capsule 3     warfarin ANTICOAGULANT (COUMADIN) 1 MG tablet TAKE 2 TABLETS BY MOUTH ONCE DAILY MONDAYS, WEDNESDAYS AND FRIDAY AND 1 TABLET DAILY ALL OTHER DAYS OR AS  DIRECTED BY INR NURSE 120 tablet 0     gabapentin (NEURONTIN) 100 MG capsule Take 1 capsule (100 mg) by mouth 3 times daily as needed for neuropathic pain 90 capsule 5       Patient Active Problem List   Diagnosis     GERD (gastroesophageal reflux disease)     CARDIOVASCULAR SCREENING; LDL GOAL LESS THAN 130     Hypertension, goal below 140/90     Long-term (current) use of anticoagulants, INR goal 2.0-3.0     Advanced directives, counseling/discussion     CKD (chronic kidney disease) stage 3, GFR 30-59 ml/min (H)     Health Care Home     Seborrhea     Eyelid dermatitis, eczematous     Uterine prolapse     Paroxysmal atrial fibrillation (H)     Pure hypercholesterolemia     Atrial fibrillation, unspecified type (H)     Cystocele with uterine prolapse     Essential hypertension, benign     Hyperlipidemia LDL goal <100     Bilateral low back pain with left-sided sciatica       Past Medical History:   Diagnosis Date     Atrial fibrillations      CKD (chronic kidney disease) stage 3, GFR 30-59 ml/min (H) 10/11/2012     Eczema      Fx lunate, wrist-closed 1/09    RIGHT     GERD (gastroesophageal reflux disease)      HTN (hypertension)      Long-term (current) use of anticoagulants, INR goal 2.0-3.0 5/10/2011     Pulmonary nodule 6/09    RECHECK Q 6 MO       Past Surgical History:   Procedure Laterality Date     D & C  80'S     HYSTERECTOMY  11-5-15     ROTATOR CUFF REPAIR RT/LT  2007    LEFT     TUBAL LIGATION  80'S       Family History   Problem Relation Age of Onset     Hypertension Mother      Cerebrovascular Disease Mother      Thyroid Disease Mother      Cerebrovascular Disease Maternal Grandfather      Hypertension Brother      Musculoskeletal Disorder Brother         FIBROMYALGIA     Heart Disease Brother      Cardiovascular Brother         ATRIAL FIB     Musculoskeletal Disorder Sister         FIBROMYALGIA     Thyroid Disease Sister      Allergies Son      Heart Disease Brother      Cardiovascular Brother       "Heart Disease Brother      Cardiovascular Brother      Heart Disease Brother      Cardiovascular Brother      Heart Disease Father      Cardiovascular Brother         ATRIAL FIB     Cardiovascular Brother         ATRIAL FIB     Cardiovascular Brother         ATRIAL FIB     Cardiovascular Brother         ATRIAL FIB       Social History     Tobacco Use     Smoking status: Former Smoker     Smokeless tobacco: Never Used   Substance Use Topics     Alcohol use: Yes     Comment: 1 - 2 PER WEEK         Exam:    Vitals: /76   Pulse 86   Ht 1.575 m (5' 2\")   BMI 23.96 kg/m    BMI: Body mass index is 23.96 kg/m .  Height: 5' 2\"    Constitutional/ general:  Pt is in no apparent distress, appears well-nourished.  Cooperative with history and physical exam.     Psych:  The patient answered questions appropriately.  Normal affect.  Seems to have reasonable expectations, in terms of treatment.     Lungs:  Non labored breathing, non labored speech. No cough.  No audible wheezing. Even, quiet breathing.       Vascular:  positive pedal pulses bilaterally for both the DP and PT arteries.  CFT < 3 sec.  positive ankle edema.  positive pedal hair growth.    Neuro:  Alert and oriented x 3. Coordinated gait.  Light touch sensation is intact   Derm: Normal texture and turgor.  No erythema, ecchymosis, or cyanosis.      Musculoskeletal:    Lower extremity muscle strength is normal.  Patient is ambulatory without an assistive device or brace.  No gross deformities.  Normal arch.        right and left first nail thickened, elongated, discolored, with subungual debris.  No erythema, edema, drainage, pain on palpation.  No signs of subungual masses or exostosis and nailbed healthy.    A/P  Onychomycosis          pain    Discussed all options with patient.  Mycotic nail manually debrided with a .      Patient will keep this debrided/filed down.  Discussed with patient that medicare will not pay for this service in the future " and that I do not do this in my practice unless patient at significant risk of limb loss.  Will refer to foot care nurse.  Discussed treating mycotic nails with Penlac.  Risk complications and efficacy discussed.  She would like to try this.  We wrote a prescription.  RETURN TO CLINIC prn.    Max Toledo, [place], FACFAS

## 2022-03-09 NOTE — PATIENT INSTRUCTIONS
We wish you continued good healing. If you have any questions or concerns, please do not hesitate to contact us at  [phone]    [facility] (secure e-mail communication and access to your chart) to send a message or to make an appointment.    Please remember to call and schedule a follow up appointment if one was recommended at your earliest convenience.     PODIATRY CLINIC HOURS  TELEPHONE NUMBER    Dr. Max BRAUNPJER [facility]        Clinics:  [place] CMA   Tuesday 1PM-6PM  [place]  Wednesday 745AM-330PM  [place]  Thursday/Friday 745AM-230PM  [facility] APPOINT[facility]  [phone]    [place] APPOINTMENTS  [phone]          If you need a medication refill, please contact us you may need lab work and/or a follow up visit prior to your refill (i.e. Antifungal medications).    If MRI needed please call Imaging at [phone] or [phone]    HOW DO I GET MY KNEE SCOOTER? Knee scooters can be picked up at ANY Medical Supply stores with your knee scooter Prescription.  OR    Bring your signed prescription to an [facility] Medical [facility] showroom.

## 2022-03-09 NOTE — LETTER
3/9/2022         RE: Jenn Joseph  [address]  [place] [address]        Dear Colleague,    Thank you for referring your patient, Jenn Joseph, to the [facility]. Please see a copy of my visit note below.    Patient complains of thick nail on right and left first toe.  Has had this for several years .  It is slowly getting worse.  Has had pain in the past which is aggravated by activity and relieved by rest.  Describes as a burning pain.  Hard to walk in shoes now because of the pain. Tried over the counter medications without success.  Does not like the look of the nail and is wondering about options.  Denies drainage or erythema    ROS: See above         Allergies   Allergen Reactions     Diflucan [Fluconazole] Rash     NOT EXACTLY SURE     Lamisil Rash     Terbinafine Rash       Current Outpatient Medications   Medication Sig Dispense Refill     atenolol (TENORMIN) 25 MG tablet Take 0.5 tablets (12.5 mg) by mouth daily 45 tablet 3     Calcium-Magnesium-Zinc 333-133-5 MG TABS Take by mouth 2 times daily        Cholecalciferol (VITAMIN D) 1000 UNITS capsule Take 1 capsule by mouth daily.       ciclopirox (PENLAC) 8 % external solution Apply topically daily 6 mL 4     Coenzyme Q10 (CO Q-10) 200 MG CAPS Take 200 mg by mouth daily 90 capsule 3     desonide (DESOWEN) 0.05 % ointment Apply small amount twice daily to the eyelids for up to two weeks at a time. Take caution to not get it in the eyes. (Patient taking differently: as needed Apply small amount twice daily to the eyelids for up to two weeks at a time. Take caution to not get it in the eyes.) 15 g 3     famotidine (PEPCID) 20 MG tablet Take 1 tablet (20 mg) by mouth 2 times daily 180 tablet 3     lisinopril (ZESTRIL) 5 MG tablet Take 1 tablet (5 mg) by mouth daily 90 tablet 3     pravastatin (PRAVACHOL) 40 MG tablet Take 1 tablet (40 mg) by mouth daily 90 tablet 2     probiotic CAPS 2 times weekly        propafenone (RYTHMOL SR) 425 MG 12 hr capsule Take 1 capsule (425 mg) by mouth 2 times daily 180 capsule 3     warfarin ANTICOAGULANT (COUMADIN) 1 MG tablet TAKE 2 TABLETS BY MOUTH ONCE DAILY MONDAYS, WEDNESDAYS AND FRIDAY AND 1 TABLET DAILY ALL OTHER DAYS OR AS DIRECTED BY INR NURSE 120 tablet 0     gabapentin (NEURONTIN) 100 MG capsule Take 1 capsule (100 mg) by mouth 3 times daily as needed for neuropathic pain 90 capsule 5       Patient Active Problem List   Diagnosis     GERD (gastroesophageal reflux disease)     CARDIOVASCULAR SCREENING; LDL GOAL LESS THAN 130     Hypertension, goal below 140/90     Long-term (current) use of anticoagulants, INR goal 2.0-3.0     Advanced directives, counseling/discussion     CKD (chronic kidney disease) stage 3, GFR 30-59 ml/min (H)     Health Care Home     Seborrhea     Eyelid dermatitis, eczematous     Uterine prolapse     Paroxysmal atrial fibrillation (H)     Pure hypercholesterolemia     Atrial fibrillation, unspecified type (H)     Cystocele with uterine prolapse     Essential hypertension, benign     Hyperlipidemia LDL goal <100     Bilateral low back pain with left-sided sciatica       Past Medical History:   Diagnosis Date     Atrial fibrillations      CKD (chronic kidney disease) stage 3, GFR 30-59 ml/min (H) 10/11/2012     Eczema      Fx lunate, wrist-closed 1/09    RIGHT     GERD (gastroesophageal reflux disease)      HTN (hypertension)      Long-term (current) use of anticoagulants, INR goal 2.0-3.0 5/10/2011     Pulmonary nodule 6/09    RECHECK Q 6 MO       Past Surgical History:   Procedure Laterality Date     D & C  80'S     HYSTERECTOMY  11-5-15     ROTATOR CUFF REPAIR RT/LT  2007    LEFT     TUBAL LIGATION  80'S       Family History   Problem Relation Age of Onset     Hypertension Mother      Cerebrovascular Disease Mother      Thyroid Disease Mother      Cerebrovascular Disease Maternal Grandfather      Hypertension Brother      Musculoskeletal Disorder  "Brother         FIBROMYALGIA     Heart Disease Brother      Cardiovascular Brother         ATRIAL FIB     Musculoskeletal Disorder Sister         FIBROMYALGIA     Thyroid Disease Sister      Allergies Son      Heart Disease Brother      Cardiovascular Brother      Heart Disease Brother      Cardiovascular Brother      Heart Disease Brother      Cardiovascular Brother      Heart Disease Father      Cardiovascular Brother         ATRIAL FIB     Cardiovascular Brother         ATRIAL FIB     Cardiovascular Brother         ATRIAL FIB     Cardiovascular Brother         ATRIAL FIB       Social History     Tobacco Use     Smoking status: Former Smoker     Smokeless tobacco: Never Used   Substance Use Topics     Alcohol use: Yes     Comment: 1 - 2 PER WEEK         Exam:    Vitals: /76   Pulse 86   Ht 1.575 m (5' 2\")   BMI 23.96 kg/m    BMI: Body mass index is 23.96 kg/m .  Height: 5' 2\"    Constitutional/ general:  Pt is in no apparent distress, appears well-nourished.  Cooperative with history and physical exam.     Psych:  The patient answered questions appropriately.  Normal affect.  Seems to have reasonable expectations, in terms of treatment.     Lungs:  Non labored breathing, non labored speech. No cough.  No audible wheezing. Even, quiet breathing.       Vascular:  positive pedal pulses bilaterally for both the DP and PT arteries.  CFT < 3 sec.  positive ankle edema.  positive pedal hair growth.    Neuro:  Alert and oriented x 3. Coordinated gait.  Light touch sensation is intact   Derm: Normal texture and turgor.  No erythema, ecchymosis, or cyanosis.      Musculoskeletal:    Lower extremity muscle strength is normal.  Patient is ambulatory without an assistive device or brace.  No gross deformities.  Normal arch.        right and left first nail thickened, elongated, discolored, with subungual debris.  No erythema, edema, drainage, pain on palpation.  No signs of subungual masses or exostosis and nailbed " healthy.    A/P  Onychomycosis          pain    Discussed all options with patient.  Mycotic nail manually debrided with a .      Patient will keep this debrided/filed down.  Discussed with patient that medicare will not pay for this service in the future and that I do not do this in my practice unless patient at significant risk of limb loss.  Will refer to foot care nurse.  Discussed treating mycotic nails with Penlac.  Risk complications and efficacy discussed.  She would like to try this.  We wrote a prescription.  RETURN TO CLINIC prn.    Max Toledo DPM, FACFAS          Again, thank you for allowing me to participate in the care of your patient.        Sincerely,        Max Toledo DPM

## 2022-03-23 ENCOUNTER — THERAPY VISIT (OUTPATIENT)
Dept: PHYSICAL THERAPY | Facility: CLINIC | Age: 77
End: 2022-03-23
Payer: COMMERCIAL

## 2022-03-23 DIAGNOSIS — M54.42 BILATERAL LOW BACK PAIN WITH LEFT-SIDED SCIATICA, UNSPECIFIED CHRONICITY: ICD-10-CM

## 2022-03-23 PROCEDURE — 97140 MANUAL THERAPY 1/> REGIONS: CPT | Mod: CQ | Performed by: PHYSICAL THERAPY ASSISTANT

## 2022-03-23 PROCEDURE — 97110 THERAPEUTIC EXERCISES: CPT | Mod: CQ | Performed by: PHYSICAL THERAPY ASSISTANT

## 2022-03-23 NOTE — PROGRESS NOTES
PROGRESS  REPORT    Progress reporting period is from 3-1-22 to 3-23-22.       SUBJECTIVE  Subjective changes noted by patient:  Pt reports her back is much better since last visit. No longer experiencing L leg pain. Stiffness in the am is better then it was before. Tylenol does seem to help with pain when needed and most helpful in the morning. HEP of extension in standing 4x day appears to be more helpful then 2x day and strengthening 1x day is going well. At this time feels ready to conitnue on her own.     Current Pain level:  (1-2/10).     Initial Pain level: 8/10.   Changes in function:  Yes (See Goal flowsheet attached for changes in current functional level)  Adverse reaction to treatment or activity: None    OBJECTIVE  Changes noted in objective findings:  Yes, Oswestry score has improved from 35% to 9%. Improved extension as no longer deviation present with extension.     Today's Objective: Current sym:  low back; LROM FIS Min loss; EIS min loss No deviation to R with extension today     ASSESSMENT/PLAN  Updated problem list and treatment plan: Diagnosis 1:  Low back with left leg pain  Pain -  manual therapy, self management, education, directional preference exercise and home program  Decreased ROM/flexibility - manual therapy, therapeutic exercise and home program  Decreased joint mobility - manual therapy, therapeutic exercise and home program  Decreased strength - therapeutic exercise, therapeutic activities and home program  Decreased function - therapeutic activities and home program  Impaired posture - neuro re-education and home program  STG/LTGs have been met or progress has been made towards goals:  Yes (See Goal flow sheet completed today.)  Assessment of Progress: The patient's condition is improving.  The patient's condition has potential to improve.  The patient has met all of their long term goals.  Patient decided to self discharge after being seen by an extender.  G codes could not be  reported by the therapist.  Self Management Plans:  Patient is independent in a home treatment program.  Patient is independent in self management of symptoms.  I have discussed this patient with primary PT and find that the nature, scope, duration and intensity of the therapy is appropriate for the medical condition of the patient.  Jenn continues to require the following intervention to meet STG and LTG's:  PT    Recommendations:  This patient feels readyt to be discharged from therapy and continue their home treatment program. If problems arise will return to MD.     The progress note/discharge summary was written in collaboration with and reviewed by the physical therapist.    Please refer to the daily flowsheet for treatment today, total treatment time and time spent performing 1:1 timed codes.

## 2022-03-29 DIAGNOSIS — I48.20 CHRONIC ATRIAL FIBRILLATION (H): ICD-10-CM

## 2022-03-29 DIAGNOSIS — Z79.01 LONG-TERM (CURRENT) USE OF ANTICOAGULANTS, INR GOAL 2.0-3.0: ICD-10-CM

## 2022-03-30 RX ORDER — WARFARIN SODIUM 1 MG/1
TABLET ORAL
Qty: 120 TABLET | Refills: 0 | Status: SHIPPED | OUTPATIENT
Start: 2022-03-30 | End: 2022-04-12

## 2022-04-01 ENCOUNTER — ANTICOAGULATION THERAPY VISIT (OUTPATIENT)
Dept: ANTICOAGULATION | Facility: CLINIC | Age: 77
End: 2022-04-01

## 2022-04-01 ENCOUNTER — LAB (OUTPATIENT)
Dept: LAB | Facility: CLINIC | Age: 77
End: 2022-04-01
Payer: COMMERCIAL

## 2022-04-01 DIAGNOSIS — I48.20 CHRONIC ATRIAL FIBRILLATION (H): ICD-10-CM

## 2022-04-01 DIAGNOSIS — I48.91 ATRIAL FIBRILLATION, UNSPECIFIED TYPE (H): Primary | ICD-10-CM

## 2022-04-01 DIAGNOSIS — I48.91 ATRIAL FIBRILLATION, UNSPECIFIED TYPE (H): ICD-10-CM

## 2022-04-01 LAB — INR BLD: 1.9 (ref 0.9–1.1)

## 2022-04-01 PROCEDURE — 85610 PROTHROMBIN TIME: CPT

## 2022-04-01 PROCEDURE — 36416 COLLJ CAPILLARY BLOOD SPEC: CPT

## 2022-04-01 NOTE — PROGRESS NOTES
ANTICOAGULATION MANAGEMENT     Jenn Joseph 77 year old female is on warfarin with subtherapeutic INR result. (Goal INR 2.0-3.0)    Recent labs: (last 7 days)     04/01/22  1347   INR 1.9*       ASSESSMENT       Source(s): Chart Review and Patient/Caregiver Call       Warfarin doses taken: Warfarin taken as instructed    Diet: Increased greens/vitamin K in diet; plans to resume previous intake salad with kale    New illness, injury, or hospitalization: No    Medication/supplement changes: None noted    Signs or symptoms of bleeding or clotting: No    Previous INR: Therapeutic last 2(+) visits    Additional findings: None       PLAN     Recommended plan for temporary change(s) affecting INR     Dosing Instructions: continue your current warfarin dose with next INR in 2 weeks       Summary  As of 4/1/2022    Full warfarin instructions:  2 mg every Mon, Wed, Fri; 1 mg all other days   Next INR check:  4/12/2022             Telephone call with Jenn who verbalizes understanding and agrees to plan    Lab visit scheduled    Education provided: Please call back if any changes to your diet, medications or how you've been taking warfarin and Contact [phone]  with any changes, questions or concerns.     Plan made per [facility] anticoagulation protocol    Irma Fry RN  Anticoagulation Clinic  4/1/2022    _______________________________________________________________________     Anticoagulation Episode Summary     Current INR goal:  2.0-3.0   TTR:  83.5 % (1 y)   Target end date:  Indefinite   Send INR reminders to:  ELIZABETH CROUCH    Indications    Long-term (current) use of anticoagulants [Z79.01] (Resolved) [Z79.01]  Atrial fibrillation/flutter (Resolved)  Atrial fibrillation  unspecified type (H) [I48.91]  Chronic atrial fibrillation (H) (Resolved) [I48.20]           Comments:  every 6 weeks  Referral renewed 2/17/21         Anticoagulation Care Providers     Provider Role Specialty Phone number     Vocal, Drake Lam MD Referring Internal Medicine [phone]

## 2022-04-12 ENCOUNTER — OFFICE VISIT (OUTPATIENT)
Dept: FAMILY MEDICINE | Facility: CLINIC | Age: 77
End: 2022-04-12
Payer: COMMERCIAL

## 2022-04-12 VITALS
OXYGEN SATURATION: 98 % | DIASTOLIC BLOOD PRESSURE: 73 MMHG | BODY MASS INDEX: 25.25 KG/M2 | WEIGHT: 137.2 LBS | RESPIRATION RATE: 18 BRPM | HEIGHT: 62 IN | HEART RATE: 62 BPM | SYSTOLIC BLOOD PRESSURE: 129 MMHG | TEMPERATURE: 98.1 F

## 2022-04-12 DIAGNOSIS — I48.0 PAROXYSMAL ATRIAL FIBRILLATION (H): ICD-10-CM

## 2022-04-12 DIAGNOSIS — Z79.01 LONG-TERM (CURRENT) USE OF ANTICOAGULANTS, INR GOAL 2.0-3.0: ICD-10-CM

## 2022-04-12 DIAGNOSIS — I10 HYPERTENSION GOAL BP (BLOOD PRESSURE) < 140/90: ICD-10-CM

## 2022-04-12 DIAGNOSIS — E78.2 MIXED HYPERLIPIDEMIA: ICD-10-CM

## 2022-04-12 DIAGNOSIS — Z23 HIGH PRIORITY FOR COVID-19 VACCINATION: ICD-10-CM

## 2022-04-12 DIAGNOSIS — N18.30 STAGE 3 CHRONIC KIDNEY DISEASE, UNSPECIFIED WHETHER STAGE 3A OR 3B CKD (H): ICD-10-CM

## 2022-04-12 DIAGNOSIS — Z00.00 ENCOUNTER FOR ANNUAL PHYSICAL EXAM: Primary | ICD-10-CM

## 2022-04-12 DIAGNOSIS — K21.9 GASTROESOPHAGEAL REFLUX DISEASE WITHOUT ESOPHAGITIS: ICD-10-CM

## 2022-04-12 LAB
ALBUMIN SERPL-MCNC: 3.4 G/DL (ref 3.4–5)
ALP SERPL-CCNC: 66 U/L (ref 40–150)
ALT SERPL W P-5'-P-CCNC: 27 U/L (ref 0–50)
ANION GAP SERPL CALCULATED.3IONS-SCNC: 6 MMOL/L (ref 3–14)
AST SERPL W P-5'-P-CCNC: 26 U/L (ref 0–45)
BASOPHILS # BLD AUTO: 0 10E3/UL (ref 0–0.2)
BASOPHILS NFR BLD AUTO: 0 %
BILIRUB SERPL-MCNC: 0.4 MG/DL (ref 0.2–1.3)
BUN SERPL-MCNC: 22 MG/DL (ref 7–30)
CALCIUM SERPL-MCNC: 9.5 MG/DL (ref 8.5–10.1)
CHLORIDE BLD-SCNC: 105 MMOL/L (ref 94–109)
CHOLEST SERPL-MCNC: 170 MG/DL
CO2 SERPL-SCNC: 29 MMOL/L (ref 20–32)
CREAT SERPL-MCNC: 0.98 MG/DL (ref 0.52–1.04)
EOSINOPHIL # BLD AUTO: 0.1 10E3/UL (ref 0–0.7)
EOSINOPHIL NFR BLD AUTO: 1 %
ERYTHROCYTE [DISTWIDTH] IN BLOOD BY AUTOMATED COUNT: 12.6 % (ref 10–15)
FASTING STATUS PATIENT QL REPORTED: NO
GFR SERPL CREATININE-BSD FRML MDRD: 59 ML/MIN/1.73M2
GLUCOSE BLD-MCNC: 100 MG/DL (ref 70–99)
HCT VFR BLD AUTO: 43.3 % (ref 35–47)
HDLC SERPL-MCNC: 58 MG/DL
HGB BLD-MCNC: 14.1 G/DL (ref 11.7–15.7)
IMM GRANULOCYTES # BLD: 0 10E3/UL
IMM GRANULOCYTES NFR BLD: 0 %
LDLC SERPL CALC-MCNC: 74 MG/DL
LYMPHOCYTES # BLD AUTO: 2.7 10E3/UL (ref 0.8–5.3)
LYMPHOCYTES NFR BLD AUTO: 32 %
MCH RBC QN AUTO: 30.1 PG (ref 26.5–33)
MCHC RBC AUTO-ENTMCNC: 32.6 G/DL (ref 31.5–36.5)
MCV RBC AUTO: 92 FL (ref 78–100)
MONOCYTES # BLD AUTO: 0.7 10E3/UL (ref 0–1.3)
MONOCYTES NFR BLD AUTO: 8 %
NEUTROPHILS # BLD AUTO: 4.9 10E3/UL (ref 1.6–8.3)
NEUTROPHILS NFR BLD AUTO: 59 %
NONHDLC SERPL-MCNC: 112 MG/DL
PLATELET # BLD AUTO: 181 10E3/UL (ref 150–450)
POTASSIUM BLD-SCNC: 3.9 MMOL/L (ref 3.4–5.3)
PROT SERPL-MCNC: 7.2 G/DL (ref 6.8–8.8)
RBC # BLD AUTO: 4.69 10E6/UL (ref 3.8–5.2)
SODIUM SERPL-SCNC: 140 MMOL/L (ref 133–144)
TRIGL SERPL-MCNC: 191 MG/DL
WBC # BLD AUTO: 8.4 10E3/UL (ref 4–11)

## 2022-04-12 PROCEDURE — 91305 COVID-19,PF,PFIZER (12+ YRS): CPT | Performed by: INTERNAL MEDICINE

## 2022-04-12 PROCEDURE — 85610 PROTHROMBIN TIME: CPT | Performed by: INTERNAL MEDICINE

## 2022-04-12 PROCEDURE — 36415 COLL VENOUS BLD VENIPUNCTURE: CPT | Performed by: INTERNAL MEDICINE

## 2022-04-12 PROCEDURE — 0054A COVID-19,PF,PFIZER (12+ YRS): CPT | Performed by: INTERNAL MEDICINE

## 2022-04-12 PROCEDURE — 99397 PER PM REEVAL EST PAT 65+ YR: CPT | Mod: 25 | Performed by: INTERNAL MEDICINE

## 2022-04-12 PROCEDURE — 85025 COMPLETE CBC W/AUTO DIFF WBC: CPT | Performed by: INTERNAL MEDICINE

## 2022-04-12 PROCEDURE — 80053 COMPREHEN METABOLIC PANEL: CPT | Performed by: INTERNAL MEDICINE

## 2022-04-12 PROCEDURE — 80061 LIPID PANEL: CPT | Performed by: INTERNAL MEDICINE

## 2022-04-12 RX ORDER — FAMOTIDINE 20 MG/1
20 TABLET, FILM COATED ORAL 2 TIMES DAILY
Qty: 180 TABLET | Refills: 3 | Status: SHIPPED | OUTPATIENT
Start: 2022-04-12 | End: 2023-04-21

## 2022-04-12 RX ORDER — ATENOLOL 25 MG/1
12.5 TABLET ORAL DAILY
Qty: 45 TABLET | Refills: 3 | Status: SHIPPED | OUTPATIENT
Start: 2022-04-12 | End: 2023-04-21

## 2022-04-12 RX ORDER — PROPAFENONE HYDROCHLORIDE 425 MG/1
425 CAPSULE, EXTENDED RELEASE ORAL 2 TIMES DAILY
Qty: 180 CAPSULE | Refills: 3 | Status: SHIPPED | OUTPATIENT
Start: 2022-04-12 | End: 2023-04-21

## 2022-04-12 RX ORDER — WARFARIN SODIUM 1 MG/1
TABLET ORAL
Qty: 120 TABLET | Refills: 11 | Status: SHIPPED | OUTPATIENT
Start: 2022-04-12 | End: 2023-05-03

## 2022-04-12 RX ORDER — LISINOPRIL 5 MG/1
5 TABLET ORAL DAILY
Qty: 90 TABLET | Refills: 3 | Status: SHIPPED | OUTPATIENT
Start: 2022-04-12 | End: 2023-04-21

## 2022-04-12 RX ORDER — PRAVASTATIN SODIUM 40 MG
40 TABLET ORAL DAILY
Qty: 90 TABLET | Refills: 3 | Status: SHIPPED | OUTPATIENT
Start: 2022-04-12 | End: 2023-05-22

## 2022-04-12 ASSESSMENT — ENCOUNTER SYMPTOMS
DIZZINESS: 0
HEADACHES: 1
HEARTBURN: 1
NAUSEA: 0
SHORTNESS OF BREATH: 0
PALPITATIONS: 0
BREAST MASS: 0
ABDOMINAL PAIN: 0
ARTHRALGIAS: 1
EYE PAIN: 0
NERVOUS/ANXIOUS: 0
HEMATOCHEZIA: 0
FEVER: 0
MYALGIAS: 1
FREQUENCY: 0
DYSURIA: 0
COUGH: 0
WEAKNESS: 0
CHILLS: 0
SORE THROAT: 0
JOINT SWELLING: 0
DIARRHEA: 0
CONSTIPATION: 0
PARESTHESIAS: 0
HEMATURIA: 0

## 2022-04-12 ASSESSMENT — ACTIVITIES OF DAILY LIVING (ADL): CURRENT_FUNCTION: NO ASSISTANCE NEEDED

## 2022-04-12 NOTE — PROGRESS NOTES
"SUBJECTIVE:   Jenn Joseph is a 77 year old female who presents for Preventive Visit.    Patient has been advised of split billing requirements and indicates understanding: Yes  Are you in the first 12 months of your Medicare coverage?  No    Healthy Habits:     In general, how would you rate your overall health?  Good    Frequency of exercise:  6-7 days/week    Duration of exercise:  15-30 minutes    Do you usually eat at least 4 servings of fruit and vegetables a day, include whole grains    & fiber and avoid regularly eating high fat or \"junk\" foods?  Yes    Taking medications regularly:  Yes    Medication side effects:  Not applicable and None    Ability to successfully perform activities of daily living:  No assistance needed    Home Safety:  No safety concerns identified    Hearing Impairment:  No hearing concerns    In the past 6 months, have you been bothered by leaking of urine? Yes    In general, how would you rate your overall mental or emotional health?  Excellent      PHQ-2 Total Score: 0    Additional concerns today:  No    Pt would like to discuss getting 2nd booster for COVID-19.  Back and right shoulder feels better after physical therapy and cessation of volunteer activities (pushing carts).    Do you feel safe in your environment? Yes    Have you ever done Advance Care Planning? (For example, a Health Directive, POLST, or a discussion with a medical provider or your loved ones about your wishes): Yes, patient states has an Advance Care Planning document and will bring a copy to the clinic.       Fall risk  Fallen 2 or more times in the past year?: No  Any fall with injury in the past year?: No    Cognitive Screening   1) Repeat 3 items (Leader, Season, Table)    2) Clock draw:   NORMAL  3) 3 item recall:   Recalls 3 objects  Results: 3 items recalled: COGNITIVE IMPAIRMENT LESS LIKELY    Mini-CogTM Copyright S Biju. Licensed by the author for use in [facility]; reprinted with " permission (jaz@[email or web address]). All rights reserved.      Reviewed and updated as needed this visit by clinical staff   Tobacco  Allergies  Meds   Med Hx  Surg Hx  Fam Hx  Soc Hx          Reviewed and updated as needed this visit by Provider                   Social History     Tobacco Use     Smoking status: Former Smoker     Smokeless tobacco: Never Used   Substance Use Topics     Alcohol use: Yes     Comment: 1 - 2 drinks PER WEEK     Alcohol Use 4/12/2022   Prescreen: >3 drinks/day or >7 drinks/week? No   Prescreen: >3 drinks/day or >7 drinks/week? -       Current providers sharing in care for this patient include:   Patient Care Team:  Drake Lucas MD as PCP - General (Internal Medicine)  Drake Lucas MD as Assigned PCP  Max Toledo DPM as Assigned Musculoskeletal Provider    The following health maintenance items are reviewed in Epic and correct as of today:  Health Maintenance Due   Topic Date Due     LUNG CANCER SCREENING  01/07/2011     Labs reviewed in EPIC  BP Readings from Last 3 Encounters:   04/12/22 129/73   03/09/22 118/76   10/28/21 122/82    Wt Readings from Last 3 Encounters:   04/12/22 62.2 kg (137 lb 3.2 oz)   10/28/21 59.4 kg (131 lb)   09/08/21 62.9 kg (138 lb 9.6 oz)          122/72 126/74 127/74 130/76 136/79 129/75 130/75 132/77 129/76 135/76 129/74 130/72  Peak SBP is 153 and peak diastolic 93        Patient Active Problem List   Diagnosis     GERD (gastroesophageal reflux disease)     CARDIOVASCULAR SCREENING; LDL GOAL LESS THAN 130     Hypertension, goal below 140/90     Long-term (current) use of anticoagulants, INR goal 2.0-3.0     Advanced directives, counseling/discussion     CKD (chronic kidney disease) stage 3, GFR 30-59 ml/min (H)     Health Care Home     Seborrhea     Eyelid dermatitis, eczematous     Uterine prolapse     Paroxysmal atrial fibrillation (H)     Pure hypercholesterolemia     Atrial fibrillation, unspecified type (H)     Cystocele with  uterine prolapse     Essential hypertension, benign     Hyperlipidemia LDL goal <100     Bilateral low back pain with left-sided sciatica     Past Surgical History:   Procedure Laterality Date     D & C  80'S     HYSTERECTOMY  11-5-15     ROTATOR CUFF REPAIR RT/LT  2007    LEFT     TUBAL LIGATION  80'S       Social History     Tobacco Use     Smoking status: Former Smoker     Smokeless tobacco: Never Used   Substance Use Topics     Alcohol use: Yes     Comment: 1 - 2 drinks PER WEEK     Family History   Problem Relation Age of Onset     Hypertension Mother      Cerebrovascular Disease Mother      Thyroid Disease Mother      Cerebrovascular Disease Maternal Grandfather      Hypertension Brother      Musculoskeletal Disorder Brother         FIBROMYALGIA     Heart Disease Brother      Cardiovascular Brother         ATRIAL FIB     Musculoskeletal Disorder Sister         FIBROMYALGIA     Thyroid Disease Sister      Allergies Son      Heart Disease Brother      Cardiovascular Brother      Heart Disease Brother      Cardiovascular Brother      Heart Disease Brother      Cardiovascular Brother      Heart Disease Father      Cardiovascular Brother         ATRIAL FIB     Cardiovascular Brother         ATRIAL FIB     Cardiovascular Brother         ATRIAL FIB     Cardiovascular Brother         ATRIAL FIB         Current Outpatient Medications   Medication Sig Dispense Refill     atenolol (TENORMIN) 25 MG tablet Take 0.5 tablets (12.5 mg) by mouth daily 45 tablet 3     Calcium-Magnesium-Zinc 333-133-5 MG TABS Take by mouth 2 times daily        Cholecalciferol (VITAMIN D) 1000 UNITS capsule Take 1 capsule by mouth daily.       ciclopirox (PENLAC) 8 % external solution Apply topically daily 6 mL 4     Coenzyme Q10 (CO Q-10) 200 MG CAPS Take 200 mg by mouth daily 90 capsule 3     famotidine (PEPCID) 20 MG tablet Take 1 tablet (20 mg) by mouth 2 times daily 180 tablet 3     lisinopril (ZESTRIL) 5 MG tablet Take 1 tablet (5 mg) by  mouth daily 90 tablet 3     pravastatin (PRAVACHOL) 40 MG tablet Take 1 tablet (40 mg) by mouth daily 90 tablet 2     probiotic CAPS 2 times weekly       propafenone (RYTHMOL SR) 425 MG 12 hr capsule Take 1 capsule (425 mg) by mouth 2 times daily 180 capsule 3     warfarin ANTICOAGULANT (COUMADIN) 1 MG tablet TAKE 2 TABLETS BY MOUTH ONCE DAILY MONDAYS, WEDNESDAYS AND FRIDAY AND 1 TABLET DAILY ALL OTHER DAYS OR AS DIRECTED BY INR NURSE 120 tablet 0     desonide (DESOWEN) 0.05 % ointment Apply small amount twice daily to the eyelids for up to two weeks at a time. Take caution to not get it in the eyes. (Patient not taking: Reported on 4/12/2022) 15 g 3     gabapentin (NEURONTIN) 100 MG capsule Take 1 capsule (100 mg) by mouth 3 times daily as needed for neuropathic pain (Patient not taking: Reported on 4/12/2022) 90 capsule 5     Allergies   Allergen Reactions     Diflucan [Fluconazole] Rash     NOT EXACTLY SURE     Lamisil Rash     Terbinafine Rash     Recent Labs   Lab Test 10/28/21  1453 05/25/21  0957 12/03/20  1410 11/24/20  0957 11/02/19  1017   LDL 97  --  116*  --  112*   HDL 65  --  70  --  59   TRIG 153*  --  147  --  202*   ALT 33 32  --  36 35   CR 0.93 1.06*  --  1.05* 0.96   GFRESTIMATED 60* 51*  --  52* 58*   GFRESTBLACK  --  59*  --  60* 67   POTASSIUM 3.9 4.3  --  4.5 4.5        Mammogram Screening - Patient over age 75, has elected to continue with screening.  Pertinent mammograms are reviewed under the imaging tab.    Review of Systems   Constitutional: Negative for chills and fever.   HENT: Negative for congestion, ear pain, hearing loss and sore throat.    Eyes: Negative for pain and visual disturbance.   Respiratory: Negative for cough and shortness of breath.    Cardiovascular: Negative for chest pain, palpitations and peripheral edema.   Gastrointestinal: Positive for heartburn. Negative for abdominal pain, constipation, diarrhea, hematochezia and nausea.   Breasts:  Negative for tenderness,  "breast mass and discharge.   Genitourinary: Negative for dysuria, frequency, genital sores, hematuria, pelvic pain, urgency, vaginal bleeding and vaginal discharge.   Musculoskeletal: Positive for arthralgias and myalgias. Negative for joint swelling.   Skin: Negative for rash.   Neurological: Positive for headaches. Negative for dizziness, weakness and paresthesias.   Psychiatric/Behavioral: Negative for mood changes. The patient is not nervous/anxious.      CONSTITUTIONAL: NEGATIVE for fever, chills, change in weight  INTEGUMENTARY/SKIN: NEGATIVE for worrisome rashes, moles or lesions  EYES: NEGATIVE for vision changes or irritation  ENT/MOUTH: NEGATIVE for ear, mouth and throat problems  RESP: NEGATIVE for significant cough or SOB  BREAST: NEGATIVE for masses, tenderness or discharge  CV: NEGATIVE for chest pain, palpitations or peripheral edema  GI: NEGATIVE for nausea, abdominal pain, heartburn, or change in bowel habits  : NEGATIVE for frequency, dysuria, or hematuria  MUSCULOSKELETAL: NEGATIVE for significant arthralgias or myalgia  NEURO: NEGATIVE for weakness, dizziness or paresthesias  ENDOCRINE: NEGATIVE for temperature intolerance, skin/hair changes  HEME: NEGATIVE for bleeding problems  PSYCHIATRIC: NEGATIVE for changes in mood or affect    OBJECTIVE:   /73 (BP Location: Right arm, Patient Position: Sitting, Cuff Size: Adult Regular)   Pulse 62   Temp 98.1  F (36.7  C) (Tympanic)   Resp 18   Ht 1.575 m (5' 2\")   Wt 62.2 kg (137 lb 3.2 oz)   SpO2 98%   BMI 25.09 kg/m   Estimated body mass index is 25.09 kg/m  as calculated from the following:    Height as of this encounter: 1.575 m (5' 2\").    Weight as of this encounter: 62.2 kg (137 lb 3.2 oz).  Physical Exam  GENERAL: healthy, alert and no distress  EYES: Eyes grossly normal to inspection  HENT: normal cephalic/atraumatic and oral mucous membranes moist  RESP: lungs clear to auscultation - no rales, rhonchi or wheezes  CV: regular rate " and rhythm, normal S1 S2, no S3 or S4, no murmur, click or rub, no peripheral edema and peripheral pulses strong  ABDOMEN: soft, nontender, no hepatosplenomegaly, no masses and bowel sounds normal  MS: no gross musculoskeletal defects noted, no edema  NEURO: Normal strength and tone, mentation intact and speech normal  PSYCH: mentation appears normal, affect normal/bright    Diagnostic Test Results:  Results for orders placed or performed in visit on 04/12/22   Comprehensive metabolic panel (BMP + Alb, Alk Phos, ALT, AST, Total. Bili, TP)     Status: Abnormal   Result Value Ref Range    Sodium 140 133 - 144 mmol/L    Potassium 3.9 3.4 - 5.3 mmol/L    Chloride 105 94 - 109 mmol/L    Carbon Dioxide (CO2) 29 20 - 32 mmol/L    Anion Gap 6 3 - 14 mmol/L    Urea Nitrogen 22 7 - 30 mg/dL    Creatinine 0.98 0.52 - 1.04 mg/dL    Calcium 9.5 8.5 - 10.1 mg/dL    Glucose 100 (H) 70 - 99 mg/dL    Alkaline Phosphatase 66 40 - 150 U/L    AST 26 0 - 45 U/L    ALT 27 0 - 50 U/L    Protein Total 7.2 6.8 - 8.8 g/dL    Albumin 3.4 3.4 - 5.0 g/dL    Bilirubin Total 0.4 0.2 - 1.3 mg/dL    GFR Estimate 59 (L) >60 mL/min/1.73m2   Lipid panel reflex to direct LDL Non-fasting     Status: Abnormal   Result Value Ref Range    Cholesterol 170 <200 mg/dL    Triglycerides 191 (H) <150 mg/dL    Direct Measure HDL 58 >=50 mg/dL    LDL Cholesterol Calculated 74 <=100 mg/dL    Non HDL Cholesterol 112 <130 mg/dL    Patient Fasting > 8hrs? No     Narrative    Cholesterol  Desirable:  <200 mg/dL    Triglycerides  Normal:  Less than 150 mg/dL  Borderline High:  150-199 mg/dL  High:  200-499 mg/dL  Very High:  Greater than or equal to 500 mg/dL    Direct Measure HDL  Female:  Greater than or equal to 50 mg/dL   Male:  Greater than or equal to 40 mg/dL    LDL Cholesterol  Desirable:  <100mg/dL  Above Desirable:  100-129 mg/dL   Borderline High:  130-159 mg/dL   High:  160-189 mg/dL   Very High:  >= 190 mg/dL    Non HDL Cholesterol  Desirable:  130  mg/dL  Above Desirable:  130-159 mg/dL  Borderline High:  160-189 mg/dL  High:  190-219 mg/dL  Very High:  Greater than or equal to 220 mg/dL   INR     Status: Abnormal   Result Value Ref Range    INR 2.48 (H) 0.85 - 1.15   CBC with platelets and differential     Status: None   Result Value Ref Range    WBC Count 8.4 4.0 - 11.0 10e3/uL    RBC Count 4.69 3.80 - 5.20 10e6/uL    Hemoglobin 14.1 11.7 - 15.7 g/dL    Hematocrit 43.3 35.0 - 47.0 %    MCV 92 78 - 100 fL    MCH 30.1 26.5 - 33.0 pg    MCHC 32.6 31.5 - 36.5 g/dL    RDW 12.6 10.0 - 15.0 %    Platelet Count 181 150 - 450 10e3/uL    % Neutrophils 59 %    % Lymphocytes 32 %    % Monocytes 8 %    % Eosinophils 1 %    % Basophils 0 %    % Immature Granulocytes 0 %    Absolute Neutrophils 4.9 1.6 - 8.3 10e3/uL    Absolute Lymphocytes 2.7 0.8 - 5.3 10e3/uL    Absolute Monocytes 0.7 0.0 - 1.3 10e3/uL    Absolute Eosinophils 0.1 0.0 - 0.7 10e3/uL    Absolute Basophils 0.0 0.0 - 0.2 10e3/uL    Absolute Immature Granulocytes 0.0 <=0.4 10e3/uL   CBC with platelets and differential     Status: None    Narrative    The following orders were created for panel order CBC with platelets and differential.  Procedure                               Abnormality         Status                     ---------                               -----------         ------                     CBC with platelets and d...[[id number]]                      Final result                 Please view results for these tests on the individual orders.       ASSESSMENT / PLAN:     Encounter for annual physical exam  - CBC with platelets and differential  - Comprehensive metabolic panel (BMP + Alb, Alk Phos, ALT, AST, Total. Bili, TP)  - REVIEW OF HEALTH MAINTENANCE PROTOCOL ORDERS    High priority for COVID-19 vaccination  - COVID-19,PF,PFIZER (12+ Yrs GRAY LABEL)    Paroxysmal atrial fibrillation (H)  - warfarin ANTICOAGULANT (COUMADIN) 1 MG tablet; TAKE 2 TABLETS BY MOUTH ONCE DAILY MONDAYS, WEDNESDAYS  "AND FRIDAY AND 1 TABLET DAILY ALL OTHER DAYS OR AS DIRECTED BY INR NURSE  - atenolol (TENORMIN) 25 MG tablet; Take 0.5 tablets (12.5 mg) by mouth daily  - INR    Stage 3 chronic kidney disease, unspecified whether stage 3a or 3b CKD (H)  - Comprehensive metabolic panel (BMP + Alb, Alk Phos, ALT, AST, Total. Bili, TP)    Hypertension goal BP (blood pressure) < 140/90  - lisinopril (ZESTRIL) 5 MG tablet; Take 1 tablet (5 mg) by mouth daily  - propafenone (RYTHMOL SR) 425 MG 12 hr capsule; Take 1 capsule (425 mg) by mouth 2 times daily  - atenolol (TENORMIN) 25 MG tablet; Take 0.5 tablets (12.5 mg) by mouth daily    Mixed hyperlipidemia  - pravastatin (PRAVACHOL) 40 MG tablet; Take 1 tablet (40 mg) by mouth daily  - Lipid panel reflex to direct LDL Non-fasting    Long-term (current) use of anticoagulants, INR goal 2.0-3.0  - warfarin ANTICOAGULANT (COUMADIN) 1 MG tablet; TAKE 2 TABLETS BY MOUTH ONCE DAILY MONDAYS, WEDNESDAYS AND FRIDAY AND 1 TABLET DAILY ALL OTHER DAYS OR AS DIRECTED BY INR NURSE  - INR    Gastroesophageal reflux disease without esophagitis  - famotidine (PEPCID) 20 MG tablet; Take 1 tablet (20 mg) by mouth 2 times daily      Patient has been advised of split billing requirements and indicates understanding: Yes    COUNSELING:  Special attention given to:       Regular exercise       Healthy diet/nutrition       Immunizations    Vaccinated against Covid-19.           The 10-year ASCVD risk score (Raghu JAIRO Jr., et al., 2013) is: 26.1%    Values used to calculate the score:      Age: 77 years      Sex: Female      Is Non- : No      Diabetic: No      Tobacco smoker: No      Systolic Blood Pressure: 129 mmHg      Is BP treated: Yes      HDL Cholesterol: 58 mg/dL      Total Cholesterol: 170 mg/dL    Estimated body mass index is 25.09 kg/m  as calculated from the following:    Height as of this encounter: 1.575 m (5' 2\").    Weight as of this encounter: 62.2 kg (137 lb 3.2 " oz).    Weight management plan: diet and exercise.    She reports that she has quit smoking. She has never used smokeless tobacco.      Appropriate preventive services were discussed with this patient, including applicable screening as appropriate for cardiovascular disease, diabetes, osteopenia/osteoporosis, and glaucoma.  As appropriate for age/gender, discussed screening for colorectal cancer, prostate cancer, breast cancer, and cervical cancer. Checklist reviewing preventive services available has been given to the patient.    Reviewed patients plan of care and provided an AVS. The Basic Care Plan (routine screening as documented in Health Maintenance) for Jenn meets the Care Plan requirement. This Care Plan has been established and reviewed with the Patient.    Counseling Resources:  ATP IV Guidelines  Pooled Cohorts Equation Calculator  Breast Cancer Risk Calculator  Breast Cancer: Medication to Reduce Risk  FRAX Risk Assessment  ICSI Preventive Guidelines  Dietary Guidelines for Americans, 2010  USDA's MyPlate  ASA Prophylaxis  Lung CA Screening    Drake Lucas MD  [facility]    Identified Health Risks:

## 2022-04-12 NOTE — NURSING NOTE
Prior to immunization administration, verified patients identity using patient s name and date of birth. Please see Immunization Activity for additional information.     Screening Questionnaire for Adult Immunization    Are you sick today?   No   Do you have allergies to medications, food, a vaccine component or latex?   No   Have you ever had a serious reaction after receiving a vaccination?   No   Do you have a long-term health problem with heart, lung, kidney, or metabolic disease (e.g., diabetes), asthma, a blood disorder, no spleen, complement component deficiency, a cochlear implant, or a spinal fluid leak?  Are you on long-term aspirin therapy?   Yes- CKD   Do you have cancer, leukemia, HIV/AIDS, or any other immune system problem?   No   Do you have a parent, brother, or sister with an immune system problem?   No   In the past 3 months, have you taken medications that affect  your immune system, such as prednisone, other steroids, or anticancer drugs; drugs for the treatment of rheumatoid arthritis, Crohn s disease, or psoriasis; or have you had radiation treatments?   No   Have you had a seizure, or a brain or other nervous system problem?   No   During the past year, have you received a transfusion of blood or blood    products, or been given immune (gamma) globulin or antiviral drug?   No   For women: Are you pregnant or is there a chance you could become       pregnant during the next month?   No   Have you received any vaccinations in the past 4 weeks?   No     Immunization questionnaire answers one yes for CKD- provider aware of this. Patient has had covid vacc 3x.       Per orders of Dr. Lucas, injection of Pfizer 4th dose given by Sheridan Coto RN. Patient instructed to remain in clinic for 15 minutes afterwards, and to report any adverse reaction to me immediately.       Screening performed by Sheridan Coto RN on 4/12/2022 at 2:47 PM.

## 2022-04-12 NOTE — PATIENT INSTRUCTIONS
At [facility], we strive to deliver an exceptional experience to you, every time we see you. If you receive a survey, please complete it as we do value your feedback.  If you have MyChart, you can expect to receive results automatically within 24 hours of their completion.  Your provider will send a note interpreting your results as well.   If you do not have MyChart, you should receive your results in about a week by mail.    Your care team:                            Family Medicine Internal Medicine   MD Drake Denise MD Shantel Branch-Fleming, MD Srinivasa Vaka, MD Katya Belousova, CHOLO Wu CNP, MD ([facility]   Sunny Jin, MD Rebecca Pack MD Amelia Massimini APRN CNP Kim Thein, APRN CNP Bethany Templen, MD             Clinic hours: Monday - Thursday 7 am-6 pm; Fridays 7 am-5 pm.   Urgent care: Monday - Friday 10 am- 8 pm; Saturday and Sunday 9 am-5 pm.    Clinic: [phone]       [facility]: Monday - Thursday 8 am - 7 pm; Friday 8 am - 6 pm  [facility]: [phone]

## 2022-04-13 ENCOUNTER — ANTICOAGULATION THERAPY VISIT (OUTPATIENT)
Dept: ANTICOAGULATION | Facility: CLINIC | Age: 77
End: 2022-04-13
Payer: COMMERCIAL

## 2022-04-13 DIAGNOSIS — I48.91 ATRIAL FIBRILLATION, UNSPECIFIED TYPE (H): Primary | ICD-10-CM

## 2022-04-13 LAB — INR PPP: 2.48 (ref 0.85–1.15)

## 2022-04-13 NOTE — PROGRESS NOTES
ANTICOAGULATION MANAGEMENT     Jenn TRAMMELL Jake 77 year old female is on warfarin with therapeutic INR result. (Goal INR 2.0-3.0)    Recent labs: (last 7 days)     04/12/22  1457   INR 2.48*       ASSESSMENT       Source(s): Chart Review and Patient/Caregiver Call       Warfarin doses taken: Warfarin taken as instructed    Diet: No new diet changes identified    New illness, injury, or hospitalization: No    Medication/supplement changes: None noted    Signs or symptoms of bleeding or clotting: No    Previous INR: Therapeutic last visit; previously outside of goal range    Additional findings: None       PLAN     Recommended plan for no diet, medication or health factor changes affecting INR     Dosing Instructions: continue your current warfarin dose with next INR in 4 weeks       Summary  As of 4/13/2022    Full warfarin instructions:  2 mg every Mon, Wed, Fri; 1 mg all other days   Next INR check:  5/11/2022             Telephone call with Jenn who verbalizes understanding and agrees to plan    Lab visit scheduled    Education provided: Please call back if any changes to your diet, medications or how you've been taking warfarin, Goal range and significance of current result and Contact [phone]  with any changes, questions or concerns.     Plan made per [facility] anticoagulation protocol    Shabnam Raygoza RN  Anticoagulation Clinic  4/13/2022    _______________________________________________________________________     Anticoagulation Episode Summary     Current INR goal:  2.0-3.0   TTR:  83.0 % (1 y)   Target end date:  Indefinite   Send INR reminders to:  ELIZABETH CROUCH    Indications    Long-term (current) use of anticoagulants [Z79.01] (Resolved) [Z79.01]  Atrial fibrillation/flutter (Resolved)  Atrial fibrillation  unspecified type (H) [I48.91]  Chronic atrial fibrillation (H) (Resolved) [I48.20]           Comments:  every 6 weeks  Referral renewed 2/17/21         Anticoagulation Care Providers      Provider Role Specialty Phone number    Drake Lucas MD Referring Internal Medicine [phone]

## 2022-05-11 ENCOUNTER — ANTICOAGULATION THERAPY VISIT (OUTPATIENT)
Dept: ANTICOAGULATION | Facility: CLINIC | Age: 77
End: 2022-05-11

## 2022-05-11 ENCOUNTER — LAB (OUTPATIENT)
Dept: LAB | Facility: CLINIC | Age: 77
End: 2022-05-11
Payer: COMMERCIAL

## 2022-05-11 DIAGNOSIS — I48.20 CHRONIC ATRIAL FIBRILLATION (H): ICD-10-CM

## 2022-05-11 DIAGNOSIS — I48.91 ATRIAL FIBRILLATION, UNSPECIFIED TYPE (H): Primary | ICD-10-CM

## 2022-05-11 DIAGNOSIS — I48.91 ATRIAL FIBRILLATION, UNSPECIFIED TYPE (H): ICD-10-CM

## 2022-05-11 LAB — INR BLD: 1.8 (ref 0.9–1.1)

## 2022-05-11 PROCEDURE — 36416 COLLJ CAPILLARY BLOOD SPEC: CPT

## 2022-05-11 PROCEDURE — 85610 PROTHROMBIN TIME: CPT

## 2022-05-11 NOTE — PROGRESS NOTES
ANTICOAGULATION MANAGEMENT     Jenn TRAMMELL Jake 77 year old female is on warfarin with subtherapeutic INR result. (Goal INR 2.0-3.0)    Recent labs: (last 7 days)     05/11/22  1134   INR 1.8*       ASSESSMENT       Source(s): Chart Review and Patient/Caregiver Call       Warfarin doses taken: Warfarin taken as instructed    Diet: Increased greens/vitamin K in diet; plans to resume previous intake    New illness, injury, or hospitalization: No    Medication/supplement changes: None noted    Signs or symptoms of bleeding or clotting: Yes: bloody nose on Sunday so increased greens for 2 days        Previous INR: Subtherapeutic    Additional findings: None       PLAN     Recommended plan for temporary change(s) affecting INR     Dosing Instructions: booster dose then continue your current warfarin dose with next INR in 2 weeks       Summary  As of 5/11/2022    Full warfarin instructions:  5/11: 3 mg; Otherwise 2 mg every Mon, Wed, Fri; 1 mg all other days   Next INR check:  5/25/2022             Telephone call with Jenn who verbalizes understanding and agrees to plan and who agrees to plan and repeated back plan correctly    Lab visit scheduled    Education provided: Goal range and significance of current result and Contact [phone]  with any changes, questions or concerns.     Plan made per [facility] anticoagulation protocol    Shabnam Raygoza RN  Anticoagulation Clinic  5/11/2022    _______________________________________________________________________     Anticoagulation Episode Summary     Current INR goal:  2.0-3.0   TTR:  80.7 % (1 y)   Target end date:  Indefinite   Send INR reminders to:  ELIZABETH CROUCH    Indications    Long-term (current) use of anticoagulants [Z79.01] (Resolved) [Z79.01]  Atrial fibrillation/flutter (Resolved)  Atrial fibrillation  unspecified type (H) [I48.91]  Chronic atrial fibrillation (H) (Resolved) [I48.20]           Comments:  every 6 weeks  Referral renewed 2/17/21          Anticoagulation Care Providers     Provider Role Specialty Phone number    Drake Lucas MD Referring Internal Medicine [phone]

## 2022-05-12 NOTE — PROGRESS NOTES
Patient did not return for further treatment and no additional progress was noted.  Please refer to the progress note and goal flowsheet completed on 03/01/22 for discharge information.

## 2022-05-24 ENCOUNTER — ANTICOAGULATION THERAPY VISIT (OUTPATIENT)
Dept: ANTICOAGULATION | Facility: CLINIC | Age: 77
End: 2022-05-24

## 2022-05-24 ENCOUNTER — LAB (OUTPATIENT)
Dept: LAB | Facility: CLINIC | Age: 77
End: 2022-05-24
Payer: COMMERCIAL

## 2022-05-24 DIAGNOSIS — I48.20 CHRONIC ATRIAL FIBRILLATION (H): ICD-10-CM

## 2022-05-24 DIAGNOSIS — I48.91 ATRIAL FIBRILLATION, UNSPECIFIED TYPE (H): Primary | ICD-10-CM

## 2022-05-24 DIAGNOSIS — I48.91 ATRIAL FIBRILLATION, UNSPECIFIED TYPE (H): ICD-10-CM

## 2022-05-24 LAB — INR BLD: 1.9 (ref 0.9–1.1)

## 2022-05-24 PROCEDURE — 36416 COLLJ CAPILLARY BLOOD SPEC: CPT

## 2022-05-24 PROCEDURE — 85610 PROTHROMBIN TIME: CPT

## 2022-05-24 NOTE — PROGRESS NOTES
ANTICOAGULATION MANAGEMENT     Jenn Joseph 77 year old female is on warfarin with subtherapeutic INR result. (Goal INR 2.0-3.0)    Recent labs: (last 7 days)     05/24/22  1141   INR 1.9*       ASSESSMENT       Source(s): Chart Review and Patient/Caregiver Call       Warfarin doses taken: Warfarin taken as instructed    Diet: No new diet changes identified    New illness, injury, or hospitalization: No    Medication/supplement changes: None noted    Signs or symptoms of bleeding or clotting: No    Previous INR: Subtherapeutic    Additional findings: None       PLAN     Recommended plan for no diet, medication or health factor changes affecting INR     Dosing Instructions: Increase your warfarin dose (10% change) with next INR in 1 week       Summary  As of 5/24/2022    Full warfarin instructions:  1 mg every Mon, Wed, Fri; 2 mg all other days   Next INR check:  6/7/2022             Telephone call with Jenn who verbalizes understanding and agrees to plan    Lab visit scheduled    Education provided: Please call back if any changes to your diet, medications or how you've been taking warfarin    Plan made per [facility] anticoagulation protocol    Lulu Lomax, RN  Anticoagulation Clinic  5/24/2022    _______________________________________________________________________     Anticoagulation Episode Summary     Current INR goal:  2.0-3.0   TTR:  77.1 % (1 y)   Target end date:  Indefinite   Send INR reminders to:  ELIZABETH CROUCH    Indications    Long-term (current) use of anticoagulants [Z79.01] (Resolved) [Z79.01]  Atrial fibrillation/flutter (Resolved)  Atrial fibrillation  unspecified type (H) [I48.91]  Chronic atrial fibrillation (H) (Resolved) [I48.20]           Comments:  every 6 weeks  Referral renewed 2/17/21         Anticoagulation Care Providers     Provider Role Specialty Phone number    Drake Lucas MD Referring Internal Medicine [phone]

## 2022-06-08 ENCOUNTER — ANTICOAGULATION THERAPY VISIT (OUTPATIENT)
Dept: ANTICOAGULATION | Facility: CLINIC | Age: 77
End: 2022-06-08

## 2022-06-08 ENCOUNTER — LAB (OUTPATIENT)
Dept: LAB | Facility: CLINIC | Age: 77
End: 2022-06-08
Payer: COMMERCIAL

## 2022-06-08 DIAGNOSIS — I48.91 ATRIAL FIBRILLATION, UNSPECIFIED TYPE (H): ICD-10-CM

## 2022-06-08 DIAGNOSIS — I48.20 CHRONIC ATRIAL FIBRILLATION (H): ICD-10-CM

## 2022-06-08 DIAGNOSIS — I48.91 ATRIAL FIBRILLATION, UNSPECIFIED TYPE (H): Primary | ICD-10-CM

## 2022-06-08 LAB — INR BLD: 2.6 (ref 0.9–1.1)

## 2022-06-08 PROCEDURE — 85610 PROTHROMBIN TIME: CPT

## 2022-06-08 PROCEDURE — 36415 COLL VENOUS BLD VENIPUNCTURE: CPT

## 2022-06-08 NOTE — PROGRESS NOTES
ANTICOAGULATION MANAGEMENT     Jenn TRAMMELL Jake 77 year old female is on warfarin with therapeutic INR result. (Goal INR 2.0-3.0)    Recent labs: (last 7 days)     06/08/22  0957   INR 2.6*       ASSESSMENT       Source(s): Chart Review and Patient/Caregiver Call       Warfarin doses taken: Warfarin taken as instructed    Diet: Decreased greens/vitamin K in diet; plans to resume previous intake    New illness, injury, or hospitalization: No    Medication/supplement changes: None noted    Signs or symptoms of bleeding or clotting: No    Previous INR: Subtherapeutic    Additional findings: None       PLAN     Recommended plan for temporary change(s) affecting INR     Dosing Instructions: continue your current warfarin dose with next INR in 2 weeks       Summary  As of 6/8/2022    Full warfarin instructions:  1 mg every Mon, Wed, Fri; 2 mg all other days   Next INR check:  6/22/2022             Telephone call with Jenn who verbalizes understanding and agrees to plan and who agrees to plan and repeated back plan correctly    Lab visit scheduled    Education provided: Importance of therapeutic range, Importance of following up at instructed interval and Contact [phone]  with any changes, questions or concerns.     Plan made per [facility] anticoagulation protocol    Shabnam Raygoza RN  Anticoagulation Clinic  6/8/2022    _______________________________________________________________________     Anticoagulation Episode Summary     Current INR goal:  2.0-3.0   TTR:  76.5 % (1 y)   Target end date:  Indefinite   Send INR reminders to:  ELIZABETH CROUCH    Indications    Long-term (current) use of anticoagulants [Z79.01] (Resolved) [Z79.01]  Atrial fibrillation/flutter (Resolved)  Atrial fibrillation  unspecified type (H) [I48.91]  Chronic atrial fibrillation (H) (Resolved) [I48.20]           Comments:  every 6 weeks  Referral renewed 2/17/21         Anticoagulation Care Providers     Provider Role Specialty Phone  number    Vocal, Drake Lam MD Referring Internal Medicine [phone]

## 2022-06-22 ENCOUNTER — ANTICOAGULATION THERAPY VISIT (OUTPATIENT)
Dept: ANTICOAGULATION | Facility: CLINIC | Age: 77
End: 2022-06-22

## 2022-06-22 ENCOUNTER — LAB (OUTPATIENT)
Dept: LAB | Facility: CLINIC | Age: 77
End: 2022-06-22
Payer: COMMERCIAL

## 2022-06-22 DIAGNOSIS — I48.91 ATRIAL FIBRILLATION, UNSPECIFIED TYPE (H): ICD-10-CM

## 2022-06-22 DIAGNOSIS — I48.20 CHRONIC ATRIAL FIBRILLATION (H): ICD-10-CM

## 2022-06-22 DIAGNOSIS — I48.91 ATRIAL FIBRILLATION, UNSPECIFIED TYPE (H): Primary | ICD-10-CM

## 2022-06-22 LAB — INR BLD: 2.2 (ref 0.9–1.1)

## 2022-06-22 PROCEDURE — 85610 PROTHROMBIN TIME: CPT

## 2022-06-22 PROCEDURE — 36416 COLLJ CAPILLARY BLOOD SPEC: CPT

## 2022-06-22 NOTE — PROGRESS NOTES
ANTICOAGULATION MANAGEMENT     Jenn Joseph 77 year old female is on warfarin with therapeutic INR result. (Goal INR 2.0-3.0)    Recent labs: (last 7 days)     06/22/22  1007   INR 2.2*       ASSESSMENT       Source(s): Chart Review and Patient/Caregiver Call       Warfarin doses taken: Warfarin taken as instructed    Diet: Increased greens/vitamin K in diet; plans to resume previous intake    New illness, injury, or hospitalization: No    Medication/supplement changes: None noted    Signs or symptoms of bleeding or clotting: No    Previous INR: Therapeutic last 2(+) visits    Additional findings: None       PLAN     Recommended plan for no diet, medication or health factor changes affecting INR     Dosing Instructions: continue your current warfarin dose with next INR in 3 weeks, Jenn out of town and will need to schedule for 7/19      Summary  As of 6/22/2022    Full warfarin instructions:  1 mg every Mon, Wed, Fri; 2 mg all other days   Next INR check:  7/13/2022             Telephone call with Jenn who verbalizes understanding and agrees to plan    Lab visit scheduled    Education provided: Goal range and significance of current result, Importance of therapeutic range, Importance of notifying clinic for changes in medications; a sooner lab recheck maybe needed. and Contact [phone]  with any changes, questions or concerns.     Plan made per [facility] anticoagulation protocol    Shabnam Raygoza RN  Anticoagulation Clinic  6/22/2022    _______________________________________________________________________     Anticoagulation Episode Summary     Current INR goal:  2.0-3.0   TTR:  76.5 % (1 y)   Target end date:  Indefinite   Send INR reminders to:  ELIZABETH CROUCH    Indications    Long-term (current) use of anticoagulants [Z79.01] (Resolved) [Z79.01]  Atrial fibrillation/flutter (Resolved)  Atrial fibrillation  unspecified type (H) [I48.91]  Chronic atrial fibrillation (H) (Resolved)  [I48.20]           Comments:  every 6 weeks  Referral renewed 2/17/21         Anticoagulation Care Providers     Provider Role Specialty Phone number    Drake Lucas MD Referring Internal Medicine [phone]

## 2022-07-14 ENCOUNTER — DOCUMENTATION ONLY (OUTPATIENT)
Dept: LAB | Facility: CLINIC | Age: 77
End: 2022-07-14

## 2022-07-14 DIAGNOSIS — I48.0 PAROXYSMAL ATRIAL FIBRILLATION (H): Primary | ICD-10-CM

## 2022-07-19 ENCOUNTER — LAB (OUTPATIENT)
Dept: LAB | Facility: CLINIC | Age: 77
End: 2022-07-19
Payer: COMMERCIAL

## 2022-07-19 ENCOUNTER — ANTICOAGULATION THERAPY VISIT (OUTPATIENT)
Dept: ANTICOAGULATION | Facility: CLINIC | Age: 77
End: 2022-07-19

## 2022-07-19 DIAGNOSIS — I48.0 PAROXYSMAL ATRIAL FIBRILLATION (H): ICD-10-CM

## 2022-07-19 DIAGNOSIS — I48.91 ATRIAL FIBRILLATION, UNSPECIFIED TYPE (H): Primary | ICD-10-CM

## 2022-07-19 LAB — INR BLD: 2.6 (ref 0.9–1.1)

## 2022-07-19 PROCEDURE — 85610 PROTHROMBIN TIME: CPT

## 2022-07-19 PROCEDURE — 36416 COLLJ CAPILLARY BLOOD SPEC: CPT

## 2022-07-19 NOTE — PROGRESS NOTES
ANTICOAGULATION MANAGEMENT     Jenn Joseph 77 year old female is on warfarin with therapeutic INR result. (Goal INR 2.0-3.0)    Recent labs: (last 7 days)     07/19/22  1023   INR 2.6*       ASSESSMENT       Source(s): Chart Review and Patient/Caregiver Call       Warfarin doses taken: Warfarin taken as instructed    Diet: No new diet changes identified    New illness, injury, or hospitalization: No    Medication/supplement changes: None noted    Signs or symptoms of bleeding or clotting: No    Previous INR: Therapeutic last 2(+) visits    Additional findings: None       PLAN     Recommended plan for no diet, medication or health factor changes affecting INR     Dosing Instructions: continue your current warfarin dose with next INR in 6 weeks       Summary  As of 7/19/2022    Full warfarin instructions:  1 mg every Mon, Wed, Fri; 2 mg all other days   Next INR check:  8/30/2022             Telephone call with Jenn who verbalizes understanding and agrees to plan    Lab visit scheduled    Education provided: Monitoring for bleeding signs and symptoms and Monitoring for clotting signs and symptoms    Plan made per [facility] anticoagulation protocol    Stella Yang RN  Anticoagulation Clinic  7/19/2022    _______________________________________________________________________     Anticoagulation Episode Summary     Current INR goal:  2.0-3.0   TTR:  76.5 % (1 y)   Target end date:  Indefinite   Send INR reminders to:  ELIZABETH CROUCH    Indications    Long-term (current) use of anticoagulants [Z79.01] (Resolved) [Z79.01]  Atrial fibrillation/flutter (Resolved)  Atrial fibrillation  unspecified type (H) [I48.91]  Chronic atrial fibrillation (H) (Resolved) [I48.20]           Comments:           Anticoagulation Care Providers     Provider Role Specialty Phone number    Drake Lucas MD Referring Internal Medicine [phone]

## 2022-08-30 ENCOUNTER — ANTICOAGULATION THERAPY VISIT (OUTPATIENT)
Dept: ANTICOAGULATION | Facility: CLINIC | Age: 77
End: 2022-08-30

## 2022-08-30 ENCOUNTER — LAB (OUTPATIENT)
Dept: LAB | Facility: CLINIC | Age: 77
End: 2022-08-30
Payer: COMMERCIAL

## 2022-08-30 DIAGNOSIS — I48.0 PAROXYSMAL ATRIAL FIBRILLATION (H): ICD-10-CM

## 2022-08-30 DIAGNOSIS — I48.91 ATRIAL FIBRILLATION, UNSPECIFIED TYPE (H): Primary | ICD-10-CM

## 2022-08-30 LAB — INR BLD: 1.8 (ref 0.9–1.1)

## 2022-08-30 PROCEDURE — 36415 COLL VENOUS BLD VENIPUNCTURE: CPT

## 2022-08-30 PROCEDURE — 85610 PROTHROMBIN TIME: CPT

## 2022-08-30 NOTE — PROGRESS NOTES
ANTICOAGULATION MANAGEMENT     Jenn TRAMMELL Jake 77 year old female is on warfarin with subtherapeutic INR result. (Goal INR 2.0-3.0)    Recent labs: (last 7 days)     08/30/22  1330   INR 1.8*       ASSESSMENT       Source(s): Chart Review and Patient/Caregiver Call       Warfarin doses taken: Warfarin taken as instructed    Diet: Increased greens/vitamin K in diet; plans to resume previous intake    New illness, injury, or hospitalization: No    Medication/supplement changes: None noted    Signs or symptoms of bleeding or clotting: No    Previous INR: Therapeutic last 2(+) visits    Additional findings: None       PLAN     Recommended plan for temporary change(s) affecting INR     Dosing Instructions: booster dose then continue your current warfarin dose with next INR in 2 weeks       Summary  As of 8/30/2022    Full warfarin instructions:  8/30: 3 mg; Otherwise 1 mg every Mon, Wed, Fri; 2 mg all other days   Next INR check:  9/13/2022             Telephone call with Jenn who verbalizes understanding and agrees to plan    Lab visit scheduled    Education provided: Importance of consistent vitamin K intake, Goal range and significance of current result, Monitoring for bleeding signs and symptoms and Monitoring for clotting signs and symptoms    Plan made per [facility] anticoagulation protocol    Stella Yang RN  Anticoagulation Clinic  8/30/2022    _______________________________________________________________________     Anticoagulation Episode Summary     Current INR goal:  2.0-3.0   TTR:  73.7 % (1 y)   Target end date:  Indefinite   Send INR reminders to:  ELIZABETH CROUCH    Indications    Atrial fibrillation  unspecified type (H) [I48.91]  Chronic atrial fibrillation (H) (Resolved) [I48.20]           Comments:           Anticoagulation Care Providers     Provider Role Specialty Phone number    Drake Lucas MD Referring Internal Medicine [phone]

## 2022-09-14 ENCOUNTER — LAB (OUTPATIENT)
Dept: LAB | Facility: CLINIC | Age: 77
End: 2022-09-14
Payer: COMMERCIAL

## 2022-09-14 ENCOUNTER — ANTICOAGULATION THERAPY VISIT (OUTPATIENT)
Dept: ANTICOAGULATION | Facility: CLINIC | Age: 77
End: 2022-09-14

## 2022-09-14 ENCOUNTER — TELEPHONE (OUTPATIENT)
Dept: FAMILY MEDICINE | Facility: CLINIC | Age: 77
End: 2022-09-14

## 2022-09-14 DIAGNOSIS — I48.0 PAROXYSMAL ATRIAL FIBRILLATION (H): ICD-10-CM

## 2022-09-14 DIAGNOSIS — I48.91 ATRIAL FIBRILLATION, UNSPECIFIED TYPE (H): Primary | ICD-10-CM

## 2022-09-14 LAB — INR BLD: 2.6 (ref 0.9–1.1)

## 2022-09-14 PROCEDURE — 36416 COLLJ CAPILLARY BLOOD SPEC: CPT

## 2022-09-14 PROCEDURE — 85610 PROTHROMBIN TIME: CPT

## 2022-09-14 NOTE — PROGRESS NOTES
ANTICOAGULATION MANAGEMENT     Jenn Joseph 77 year old female is on warfarin with therapeutic INR result. (Goal INR 2.0-3.0)    Recent labs: (last 7 days)     09/14/22  1012   INR 2.6*       ASSESSMENT       Source(s): Chart Review    Previous INR was Subtherapeutic    Medication, diet, health changes since last INR chart reviewed; none identified           PLAN     Recommended plan for no diet, medication or health factor changes affecting INR     Dosing Instructions: Continue your current warfarin dose with next INR in 3 weeks       Summary  As of 9/14/2022    Full warfarin instructions:  1 mg every Mon, Wed, Fri; 2 mg all other days   Next INR check:  10/5/2022             Detailed voice message left for Jenn with dosing instructions and follow up date.     Contact [phone]  to schedule and with any changes, questions or concerns.     Education provided: Please call back if any changes to your diet, medications or how you've been taking warfarin, Goal range and significance of current result, Importance of therapeutic range, Importance of following up at instructed interval and Contact [phone]  with any changes, questions or concerns.     Plan made per [facility] anticoagulation protocol    Shabnam Raygoza RN  Anticoagulation Clinic  9/14/2022    _______________________________________________________________________     Anticoagulation Episode Summary     Current INR goal:  2.0-3.0   TTR:  72.6 % (1 y)   Target end date:  Indefinite   Send INR reminders to:  ELIZABETH CROUCH    Indications    Atrial fibrillation  unspecified type (H) [I48.91]  Chronic atrial fibrillation (H) (Resolved) [I48.20]           Comments:           Anticoagulation Care Providers     Provider Role Specialty Phone number    Drake Lucas MD Referring Internal Medicine [phone]

## 2022-10-04 ENCOUNTER — LAB (OUTPATIENT)
Dept: LAB | Facility: CLINIC | Age: 77
End: 2022-10-04
Payer: COMMERCIAL

## 2022-10-04 ENCOUNTER — ANTICOAGULATION THERAPY VISIT (OUTPATIENT)
Dept: ANTICOAGULATION | Facility: CLINIC | Age: 77
End: 2022-10-04

## 2022-10-04 DIAGNOSIS — I48.0 PAROXYSMAL ATRIAL FIBRILLATION (H): ICD-10-CM

## 2022-10-04 DIAGNOSIS — I48.91 ATRIAL FIBRILLATION, UNSPECIFIED TYPE (H): Primary | ICD-10-CM

## 2022-10-04 DIAGNOSIS — N18.30 CKD (CHRONIC KIDNEY DISEASE) STAGE 3, GFR 30-59 ML/MIN (H): Primary | ICD-10-CM

## 2022-10-04 LAB — INR BLD: 2.4 (ref 0.9–1.1)

## 2022-10-04 PROCEDURE — 85610 PROTHROMBIN TIME: CPT

## 2022-10-04 PROCEDURE — 36415 COLL VENOUS BLD VENIPUNCTURE: CPT

## 2022-10-04 NOTE — PROGRESS NOTES
ANTICOAGULATION MANAGEMENT     Jenn Joseph 77 year old female is on warfarin with therapeutic INR result. (Goal INR 2.0-3.0)    Recent labs: (last 7 days)     10/04/22  1027   INR 2.4*       ASSESSMENT       Source(s): Chart Review       Warfarin doses taken: Warfarin taken as instructed    Diet: No new diet changes identified    New illness, injury, or hospitalization: No    Medication/supplement changes: None noted    Signs or symptoms of bleeding or clotting: No    Previous INR: Therapeutic last 2(+) visits    Additional findings: None       PLAN     Recommended plan for no diet, medication or health factor changes affecting INR     Dosing Instructions: Continue your current warfarin dose with next INR in 6 weeks       Summary  As of 10/4/2022    Full warfarin instructions:  1 mg every Mon, Wed, Fri; 2 mg all other days   Next INR check:  11/1/2022             Telephone call with Jenn who verbalizes understanding and agrees to plan    Lab visit scheduled    Education provided: Please call back if any changes to your diet, medications or how you've been taking warfarin    Plan made per [facility] anticoagulation protocol    Lulu Lomax RN  Anticoagulation Clinic  10/4/2022    _______________________________________________________________________     Anticoagulation Episode Summary     Current INR goal:  2.0-3.0   TTR:  72.6 % (1 y)   Target end date:  Indefinite   Send INR reminders to:  ELIZABETH CROUCH    Indications    Atrial fibrillation  unspecified type (H) [I48.91]  Chronic atrial fibrillation (H) (Resolved) [I48.20]           Comments:           Anticoagulation Care Providers     Provider Role Specialty Phone number    Drake Lucas MD Referring Internal Medicine [phone]

## 2022-10-22 NOTE — TELEPHONE ENCOUNTER
@TD    Call Regarding Preventative Health Screening Annual Wellness    Attempt 1    Message on voicemail     Comments:       Outreach   SV        
Yes

## 2022-11-15 ENCOUNTER — LAB (OUTPATIENT)
Dept: LAB | Facility: CLINIC | Age: 77
End: 2022-11-15
Payer: COMMERCIAL

## 2022-11-15 ENCOUNTER — ANTICOAGULATION THERAPY VISIT (OUTPATIENT)
Dept: ANTICOAGULATION | Facility: CLINIC | Age: 77
End: 2022-11-15

## 2022-11-15 DIAGNOSIS — I48.0 PAROXYSMAL ATRIAL FIBRILLATION (H): ICD-10-CM

## 2022-11-15 DIAGNOSIS — I48.91 ATRIAL FIBRILLATION, UNSPECIFIED TYPE (H): Primary | ICD-10-CM

## 2022-11-15 LAB — INR BLD: 2.6 (ref 0.9–1.1)

## 2022-11-15 PROCEDURE — 85610 PROTHROMBIN TIME: CPT

## 2022-11-15 PROCEDURE — 36416 COLLJ CAPILLARY BLOOD SPEC: CPT

## 2022-11-15 NOTE — PROGRESS NOTES
ANTICOAGULATION MANAGEMENT     Jenn Joseph 77 year old female is on warfarin with therapeutic INR result. (Goal INR 2.0-3.0)    Recent labs: (last 7 days)     11/15/22  1030   INR 2.6*       ASSESSMENT       Source(s): Chart Review and Patient/Caregiver Call       Warfarin doses taken: Warfarin taken as instructed    Diet: No new diet changes identified    New illness, injury, or hospitalization: No    Medication/supplement changes: None noted    Signs or symptoms of bleeding or clotting: No    Previous INR: Therapeutic last 2(+) visits    Additional findings: None       PLAN     Recommended plan for no diet, medication or health factor changes affecting INR     Dosing Instructions: Continue your current warfarin dose with next INR in 6 weeks       Summary  As of 11/15/2022    Full warfarin instructions:  1 mg every Mon, Wed, Fri; 2 mg all other days; Starting 11/15/2022   Next INR check:  12/27/2022             Telephone call with Jenn who verbalizes understanding and agrees to plan    Lab visit scheduled    Education provided:     Contact [phone] with any changes, questions or concerns.     Plan made per [facility] anticoagulation protocol    Elda Aguero RN  Anticoagulation Clinic  11/15/2022    _______________________________________________________________________     Anticoagulation Episode Summary     Current INR goal:  2.0-3.0   TTR:  72.6 % (1 y)   Target end date:  Indefinite   Send INR reminders to:  ELIZABETH CROUCH    Indications    Atrial fibrillation  unspecified type (H) [I48.91]  Chronic atrial fibrillation (H) (Resolved) [I48.20]           Comments:           Anticoagulation Care Providers     Provider Role Specialty Phone number    Drake Lucas MD Referring Internal Medicine [phone]

## 2022-12-30 ENCOUNTER — ANTICOAGULATION THERAPY VISIT (OUTPATIENT)
Dept: ANTICOAGULATION | Facility: CLINIC | Age: 77
End: 2022-12-30

## 2022-12-30 ENCOUNTER — LAB (OUTPATIENT)
Dept: LAB | Facility: CLINIC | Age: 77
End: 2022-12-30
Payer: COMMERCIAL

## 2022-12-30 DIAGNOSIS — I48.91 ATRIAL FIBRILLATION, UNSPECIFIED TYPE (H): Primary | ICD-10-CM

## 2022-12-30 DIAGNOSIS — I48.0 PAROXYSMAL ATRIAL FIBRILLATION (H): ICD-10-CM

## 2022-12-30 LAB — INR BLD: 2.8 (ref 0.9–1.1)

## 2022-12-30 PROCEDURE — 36415 COLL VENOUS BLD VENIPUNCTURE: CPT

## 2022-12-30 PROCEDURE — 85610 PROTHROMBIN TIME: CPT

## 2022-12-30 NOTE — PROGRESS NOTES
ANTICOAGULATION MANAGEMENT     Jenn TRAMMELL Jake 77 year old female is on warfarin with therapeutic INR result. (Goal INR 2.0-3.0)    Recent labs: (last 7 days)     12/30/22  1042   INR 2.8*       ASSESSMENT       Source(s): Chart Review and Patient/Caregiver Call       Warfarin doses taken: Warfarin taken as instructed    Diet: No new diet changes identified    New illness, injury, or hospitalization: No    Medication/supplement changes: None noted    Signs or symptoms of bleeding or clotting: No    Previous INR: Therapeutic last 2(+) visits    Additional findings: None       PLAN     Recommended plan for no diet, medication or health factor changes affecting INR     Dosing Instructions: Continue your current warfarin dose with next INR in 6 weeks       Summary  As of 12/30/2022    Full warfarin instructions:  1 mg every Mon, Wed, Fri; 2 mg all other days   Next INR check:  2/10/2023             Telephone call with Jenn who verbalizes understanding and agrees to plan    Lab visit scheduled    Education provided:     Please call back if any changes to your diet, medications or how you've been taking warfarin    Goal range and lab monitoring: goal range and significance of current result, Importance of therapeutic range and Importance of following up at instructed interval    Plan made per [facility] anticoagulation protocol    Shabnam Raygoza RN  Anticoagulation Clinic  12/30/2022    _______________________________________________________________________     Anticoagulation Episode Summary     Current INR goal:  2.0-3.0   TTR:  77.9 % (1 y)   Target end date:  Indefinite   Send INR reminders to:  ELIZABETH CROUCH    Indications    Atrial fibrillation  unspecified type (H) [I48.91]  Chronic atrial fibrillation (H) (Resolved) [I48.20]           Comments:           Anticoagulation Care Providers     Provider Role Specialty Phone number    Drake Lucas MD Referring Internal Medicine [phone]

## 2023-01-03 ENCOUNTER — NURSE TRIAGE (OUTPATIENT)
Dept: FAMILY MEDICINE | Facility: CLINIC | Age: 78
End: 2023-01-03

## 2023-01-03 NOTE — TELEPHONE ENCOUNTER
Nurse Triage SBAR    Is this a 2nd Level Triage? YES, LICENSED PRACTITIONER REVIEW IS REQUIRED    Situation: Pt reports new left knee pain upon awakening on 12/28/22. Pain primarily on the lateral side of the back of the knee.  Pain is #5/10 at rest, #7-8/10 with ambulation or stairs. Pain was worst on initial day of sx, and has remained consistent level of pain since then.    Mild pain is currently radiating to the left ankle, pain also intermittently radiates to lt hip.     Pt states there is no change in appearance between rt and lt knee. DENIES: redness, swelling, warmth, or weakness.      Background: No hx of similar pain, and no known injury.    Assessment: Persistent, non-worsening knee pain intermittently radiating to ankle and hip.    Protocol Recommended Disposition:   See in Office Within 3 Days    Recommendation: Per protocol, moderate knee pain should be evaluated within 3 days, but if determined a DVT should be ruled out pt should be seen same day. Please advise.    Routed to provider    Does the patient meet one of the following criteria for ADS visit consideration? 16+ years old, with an [facility] PCP     TIP  Providers, please consider if this condition is appropriate for management at one of our Acute and Diagnostic Services sites.     If patient is a good candidate, please use dotphrase <dot>triageresponse and select Refer to ADS to document.      Reason for Disposition    MODERATE pain (e.g., symptoms interfere with work or school, limping) and present > 3 days    Additional Information    Negative: Sounds like a life-threatening emergency to the triager    Negative: Followed a knee injury    Negative: Swollen knee joint and fever    Negative: Can't move swollen joint at all    Negative: Thigh or calf swelling and only 1 side    Negative: Patient sounds very sick or weak to the triager    Negative: SEVERE pain (e.g., excruciating, unable to walk)    Negative: Very swollen knee joint    Negative:  "Painful rash with multiple small blisters grouped together (i.e., dermatomal distribution or 'band' or 'stripe')    Negative: Looks like a boil, infected sore, deep ulcer, or other infected rash (spreading redness, pus)    Answer Assessment - Initial Assessment Questions  1. LOCATION and RADIATION: \"Where is the pain located?\"       Left knee pain since 12/28/22. Pain is in the lateral side behind knee.    2. QUALITY: \"What does the pain feel like?\"  (e.g., sharp, dull, aching, burning)      \"Achy and sometimes its up to my hip and sometimes down to my ankle.\"    No change in appearance or temperature compared to other leg.    Still functions normally and denies weakness.    3. SEVERITY: \"How bad is the pain?\" \"What does it keep you from doing?\"   (Scale 1-10; or mild, moderate, severe)    -  MILD (1-3): doesn't interfere with normal activities     -  MODERATE (4-7): interferes with normal activities (e.g., work or school) or awakens from sleep, limping     -  SEVERE (8-10): excruciating pain, unable to do any normal activities, unable to walk      #5 - 7or 8 depending activity.    4. ONSET: \"When did the pain start?\" \"Does it come and go, or is it there all the time?\"      12/28/22    5. RECURRENT: \"Have you had this pain before?\" If Yes, ask: \"When, and what happened then?\"      No    6. SETTING: \"Has there been any recent work, exercise or other activity that involved that part of the body?\"       No    7. AGGRAVATING FACTORS: \"What makes the knee pain worse?\" (e.g., walking, climbing stairs, running)      Walking, stairs    8. ASSOCIATED SYMPTOMS: \"Is there any swelling or redness of the knee?\"      None    9. OTHER SYMPTOMS: \"Do you have any other symptoms?\" (e.g., chest pain, difficulty breathing, fever, calf pain)      Denies    Protocols used: KNEE PAIN-A-OH    Yuli Liu, ANNIEN, RN    "

## 2023-01-03 NOTE — TELEPHONE ENCOUNTER
Pt notified via phone of provider 1/3/2023  3:18 PM message below as written. Pt agrees with the plan. Pt does not want to travel today due to weather conditions.   Pt states she will wait to be seen this week until weather improves. Pt will either be seen by an available Primary Care provider, or utilize [facility] if no appointments remain.    ANNIE RobbinsN, RN

## 2023-01-06 ENCOUNTER — OFFICE VISIT (OUTPATIENT)
Dept: ORTHOPEDICS | Facility: CLINIC | Age: 78
End: 2023-01-06
Payer: COMMERCIAL

## 2023-01-06 ENCOUNTER — ANCILLARY PROCEDURE (OUTPATIENT)
Dept: GENERAL RADIOLOGY | Facility: CLINIC | Age: 78
End: 2023-01-06
Attending: PEDIATRICS
Payer: COMMERCIAL

## 2023-01-06 VITALS
HEART RATE: 70 BPM | DIASTOLIC BLOOD PRESSURE: 93 MMHG | SYSTOLIC BLOOD PRESSURE: 124 MMHG | BODY MASS INDEX: 24.87 KG/M2 | WEIGHT: 136 LBS

## 2023-01-06 DIAGNOSIS — S89.92XA KNEE INJURY, LEFT, INITIAL ENCOUNTER: ICD-10-CM

## 2023-01-06 DIAGNOSIS — S89.92XA KNEE INJURY, LEFT, INITIAL ENCOUNTER: Primary | ICD-10-CM

## 2023-01-06 PROCEDURE — 73562 X-RAY EXAM OF KNEE 3: CPT | Mod: TC | Performed by: RADIOLOGY

## 2023-01-06 PROCEDURE — 99203 OFFICE O/P NEW LOW 30 MIN: CPT | Performed by: PEDIATRICS

## 2023-01-06 ASSESSMENT — PAIN SCALES - GENERAL: PAINLEVEL: MODERATE PAIN (5)

## 2023-01-06 NOTE — PATIENT INSTRUCTIONS
Clinically, this is most consistent with flare of some mild underlying degenerative change.  X-rays today are fairly reassuring, though I do think some mild degenerative changes present.  Clinically, also consistent with popliteal (Baker) cyst, with some swelling at the posterior knee.  Reviewed options for symptom management, activity modification, imaging with MRI, also compression/support, rehab approach, use of medications, and injection.  For now, okay to continue with monitoring.  May do icing or cool packs for comfort.  Over-the-counter medication such as Tylenol is appropriate, for pain.  Modify activities at for pain as needed.  Hold with any additional imaging for now, but may reconsider depending on course.  Plan physical therapy referral next, working on overall function of the left lower extremity.  PT order placed.  For now, leave follow-up open-ended.  Contact clinic for any questions or concerns.    If you have any further questions for your physician or physician s care team you can contact them thru [employer] or by calling  [phone] and use option 3 to leave a voice message.   Messages received during business hours will be returned same day.

## 2023-01-06 NOTE — PROGRESS NOTES
ASSESSMENT & PLAN    Jenn was seen today for pain.    Diagnoses and all orders for this visit:    Knee injury, left, initial encounter  -     XR Knee Standing AP [place] Bilat Lat Left; Future  -     Physical Therapy Referral; Future          See Patient Instructions  Patient Instructions   Clinically, this is most consistent with flare of some mild underlying degenerative change.  X-rays today are fairly reassuring, though I do think some mild degenerative changes present.  Clinically, also consistent with popliteal (Baker) cyst, with some swelling at the posterior knee.  Reviewed options for symptom management, activity modification, imaging with MRI, also compression/support, rehab approach, use of medications, and injection.  For now, okay to continue with monitoring.  May do icing or cool packs for comfort.  Over-the-counter medication such as Tylenol is appropriate, for pain.  Modify activities at for pain as needed.  Hold with any additional imaging for now, but may reconsider depending on course.  Plan physical therapy referral next, working on overall function of the left lower extremity.  PT order placed.  For now, leave follow-up open-ended.  Contact clinic for any questions or concerns.    If you have any further questions for your physician or physician s care team you can contact them thru MyChart or by calling  [phone] and use option 3 to leave a voice message.   Messages received during business hours will be returned same day.          [facility]onstantin [facility] SPORTS MEDICINE CLINIC MEGAN      CC: Drake Lucas      -----  Chief Complaint   Patient presents with     Left Knee - Pain       SUBJECTIVE  Jenn Joseph is a/an 77 year old female who is seen in consultation at the request of  Drake Lucas M.D. for evaluation of left knee, as WALK IN.     The patient is seen by themselves.  The patient is Right handed    Onset: started 1 week ago, 12/28/22,  last Weds - pain in L knee, going down to Ankle and up to Left hip Patient describes injury as turned in bed felt something behind knee.  Location of Pain:  Behind  Left knee   Worsened by: standing,  Going down stairs, squatting & getting back up  Better with: sitting with legs up   Treatments tried: rest/activity avoidance, ice, heat, Tylenol and home exercises  Associated symptoms: no distal numbness or tingling; denies swelling or warmth. Has had back pain for years doing those physical therapy back exercises.    Orthopedic/Surgical history: NO for knee; yes for back  History of sciatica; also see below.    No family history pertinent to patient's problem today.     **  Initially noted 9 days ago, stepping onto floor, noted diffuse sharp pain in left knee.  Still with pain in left knee, improves with sitting in recliner or in bed. However, pain is consistent with movement, walking.  Has noted some swelling today.  Pain is more posterior, mild in lateral and anterior aspects.  No noted joint noise.    Sometimes gets some radiation in thigh and lower leg, has history lumbar DDD; does low back exercises regularly for that, and notes improvement with that.        REVIEW OF SYSTEMS:  Review of Systems   All other systems reviewed and are negative.        OBJECTIVE:  BP (!) 124/93   Pulse 70   Wt 61.7 kg (136 lb)   BMI 24.87 kg/m     General: healthy, alert and in no distress  HEENT: no scleral icterus or conjunctival erythema  Skin: no suspicious lesions or rash. No jaundice.  CV: distal perfusion intact   Resp: normal respiratory effort without conversational dyspnea   Psych: normal mood and affect  Gait: antalgic  Neuro: Normal light sensory exam of extremity       Left Knee exam    Inspection:   + mild effusion   no ecchymosis    ROM:      Flexion 100-110 degrees       Extension full degrees       Range of motion limited by tightness, some pain    Patellar Motion:      Normal patellar tracking noted through  range of motion    Tender:      lateral joint line       Posterior knee, with some fullness posteriorly    Special Tests:      neg (-) Naida       neg (-) Lachman       neg (-) anterior drawer       neg (-) posterior drawer       neg (-) varus for laxity, but + pain present       neg (-) valgus for laxity, but + pain present       no pain with forced extension      **  Straight leg raise negative      RADIOLOGY:  I independently ordered, visualized and reviewed these images with the patient  Right chondrocalcinosis, with appearance of articular body. No acute bony abnormality noted. Appears to have mild lateral compartment narrowing on left, relative to medial.      XR Knee Standing AP [place] Bilat Lat Left    Narrative    KNEE STANDING AP SUNRISE BILATERAL LATERAL LEFT   1/6/2023 12:31 PM     HISTORY:  Knee injury, left, initial encounter. Pain.    COMPARISON: None.      Impression    IMPRESSION:    Right: Chondrocalcinosis in the medial and lateral menisci. The body  of the medial meniscus is subluxed medially from the knee joint  indicating loss of hoop tension. There is at least one intra-articular  body in the central aspect of the joint. Extensive arterial  calcifications.    Left: Extensive arterial calcifications. Normal bones, joint spaces  and alignment. No fracture, effusion or calcified intra-articular  body.    MARSHALL BRADY MD         SYSTEM ID:  [id number]

## 2023-01-09 ENCOUNTER — TELEPHONE (OUTPATIENT)
Dept: FAMILY MEDICINE | Facility: CLINIC | Age: 78
End: 2023-01-09

## 2023-01-09 NOTE — TELEPHONE ENCOUNTER
"Patient calling in, stating she is doing PAR-Q questionnaire and it states if she answers yes to any of the questions, she has to contact her PCP for clearance. The two questions were \"Do you have any bone or joint issues?\" Pt has arthritis. And \"Do you have any prescriptions that control your BP?\" and patient does but states well controlled. Looking to figure out what she should do next.      Buddy Freeman       "

## 2023-01-11 ENCOUNTER — OFFICE VISIT (OUTPATIENT)
Dept: FAMILY MEDICINE | Facility: CLINIC | Age: 78
End: 2023-01-11
Payer: COMMERCIAL

## 2023-01-11 VITALS
OXYGEN SATURATION: 95 % | RESPIRATION RATE: 14 BRPM | SYSTOLIC BLOOD PRESSURE: 120 MMHG | HEIGHT: 62 IN | BODY MASS INDEX: 25.03 KG/M2 | TEMPERATURE: 98.5 F | DIASTOLIC BLOOD PRESSURE: 79 MMHG | HEART RATE: 91 BPM | WEIGHT: 136 LBS

## 2023-01-11 DIAGNOSIS — M25.562 KNEE MENISCUS PAIN, LEFT: ICD-10-CM

## 2023-01-11 DIAGNOSIS — N18.30 STAGE 3 CHRONIC KIDNEY DISEASE, UNSPECIFIED WHETHER STAGE 3A OR 3B CKD (H): ICD-10-CM

## 2023-01-11 DIAGNOSIS — S83.422A SPRAIN OF LATERAL COLLATERAL LIGAMENT OF LEFT KNEE, INITIAL ENCOUNTER: Primary | ICD-10-CM

## 2023-01-11 DIAGNOSIS — I48.0 PAROXYSMAL ATRIAL FIBRILLATION (H): ICD-10-CM

## 2023-01-11 PROCEDURE — 99214 OFFICE O/P EST MOD 30 MIN: CPT | Performed by: INTERNAL MEDICINE

## 2023-01-11 RX ORDER — SODIUM PHOSPHATE,MONO-DIBASIC 19G-7G/118
3 ENEMA (ML) RECTAL DAILY
COMMUNITY
End: 2023-04-21

## 2023-01-11 ASSESSMENT — PAIN SCALES - GENERAL: PAINLEVEL: MODERATE PAIN (5)

## 2023-01-11 NOTE — PROGRESS NOTES
[facility] Internal Medicine Progress Note           Assessment and Plan:     (S83.422A) Sprain of lateral collateral ligament of left knee, initial encounter  (primary encounter diagnosis)  Comment: The most probable cause of left knee pain, that appears to be aggravated by patient's leisure activities, such as bowling.    (M25.562) Lateral meniscal pain, left   Comment: Secondary cause of patient's left knee pain due to underlying degenerative arthritis.    (I48.0) Paroxysmal atrial fibrillation (H)  Comment: Basis for chronic anticoagulation with Warfarin and NSAID contraindication.    (N18.30) Stage 3 chronic kidney disease, unspecified whether stage 3a or 3b CKD (H)  Comment: Stable renal function.           Interval History:     Jenn is a 77 year old, presenting for the following health issues:    Reason for visit:  Left knee pain  Symptom onset:  1-2 weeks ago  Symptom intensity:  Moderate  Symptom progression:  Staying the same  Had these symptoms before:  No  What makes it worse:  Steps  What makes it better:  Tylenol  cool pack  sitting with left leg up  laying in bed    She eats 2-3 servings of fruits and vegetables daily.She consumes 1 sweetened beverage(s) daily.She exercises with enough effort to increase her heart rate 9 or less minutes per day.  She exercises with enough effort to increase her heart rate 4 days per week.   She is taking medications regularly.               Significant Problems:   Patient Active Problem List   Diagnosis     GERD (gastroesophageal reflux disease)     CARDIOVASCULAR SCREENING; LDL GOAL LESS THAN 130     Hypertension, goal below 140/90     Long-term (current) use of anticoagulants, INR goal 2.0-3.0     Advanced directives, counseling/discussion     CKD (chronic kidney disease) stage 3, GFR 30-59 ml/min (H)     Health Care Home     Seborrhea     Eyelid dermatitis, eczematous     Uterine prolapse     Paroxysmal atrial fibrillation (H)     Pure  "hypercholesterolemia     Atrial fibrillation, unspecified type (H)     Cystocele with uterine prolapse     Essential hypertension, benign     Hyperlipidemia LDL goal <100     Bilateral low back pain with left-sided sciatica     Sprain of lateral collateral ligament of left knee, initial encounter     Lateral meniscal pain, left               Review of Systems:   CONSTITUTIONAL: NEGATIVE for fever, chills, change in weight  INTEGUMENTARY/SKIN: NEGATIVE for worrisome rashes, moles or lesions  EYES: NEGATIVE for vision changes or irritation  ENT/MOUTH: NEGATIVE for ear, mouth and throat problems  RESP: NEGATIVE for significant cough or SOB  BREAST: NEGATIVE for masses, tenderness or discharge  CV: POSITIVE for paroxysmal atrial fibrillation.  GI: NEGATIVE for nausea, abdominal pain, heartburn, or change in bowel habits  : NEGATIVE for frequency, dysuria, or hematuria   female: POSITIVE for chronic kidney disease.  MUSCULOSKELETAL:As above.  NEURO: NEGATIVE for weakness, dizziness or paresthesias  ENDOCRINE: NEGATIVE for temperature intolerance, skin/hair changes  HEME: NEGATIVE for bleeding problems  PSYCHIATRIC: NEGATIVE for changes in mood or affect             Physical Exam:   /79   Pulse 91   Temp 98.5  F (36.9  C) (Tympanic)   Resp 14   Ht 1.575 m (5' 2\")   Wt 61.7 kg (136 lb)   SpO2 95%   BMI 24.87 kg/m    Constitutional: Awake, alert, cooperative, no apparent distress, and appears stated age.  Eyes: extra-ocular muscles intact  ENT: normocepalic, without obvious abnormality  Lungs: no increased work of breathing and no retractions  Cardiovascular: regular rate and rhythm  Musculoskeletal: no lower extremity pitting edema present. Tenderness on palpation of medial aspect of the left knee and tenderness on palpation of the left collateral ligament.  Neurologic: Motor Exam:  moves all extremities well and symmetrically  Neuropsychiatric: Normal affect, mood, orientation, memory and insight.  Skin: " No rashes, erythema, pallor, petechia or purpura.          Data:   Epic reviewed.     Disposition:  Follow-up on 4/21/2023.      Drake Lucas MD  Internal Medicine  [facility] Team

## 2023-01-11 NOTE — PATIENT INSTRUCTIONS
At [facility], we strive to deliver an exceptional experience to you, every time we see you. If you receive a survey, please complete it as we do value your feedback.  If you have MyChart, you can expect to receive results automatically within 24 hours of their completion.  Your provider will send a note interpreting your results as well.   If you do not have MyChart, you should receive your results in about a week by mail.    Your care team:                            Family Medicine Internal Medicine   MD Drake Denise MD Shantel Branch-Fleming, MD Srinivasa Vaka, MD Katya Belousova, PACHOLO Carcamo CNP, MD ([facility]) [facility]   Sunny Jin, MD Rebecca Pack MD Amelia Massimini APRN HARJIT Barney APRN MD Mayte Liu MD          Clinic hours: Monday - Thursday 7 am-6 pm; Fridays 7 am-5 pm.   Urgent care: Monday - Friday 10 am- 8 pm; Saturday and Sunday 9 am-5 pm.    Clinic: [phone]       [facility]: Monday - Thursday 8 am - 7 pm; Friday 8 am - 6 pm  [facility]: [phone]

## 2023-02-10 ENCOUNTER — LAB (OUTPATIENT)
Dept: LAB | Facility: CLINIC | Age: 78
End: 2023-02-10
Payer: COMMERCIAL

## 2023-02-10 ENCOUNTER — TELEPHONE (OUTPATIENT)
Dept: FAMILY MEDICINE | Facility: CLINIC | Age: 78
End: 2023-02-10

## 2023-02-10 ENCOUNTER — DOCUMENTATION ONLY (OUTPATIENT)
Dept: ANTICOAGULATION | Facility: CLINIC | Age: 78
End: 2023-02-10

## 2023-02-10 ENCOUNTER — ANTICOAGULATION THERAPY VISIT (OUTPATIENT)
Dept: ANTICOAGULATION | Facility: CLINIC | Age: 78
End: 2023-02-10

## 2023-02-10 DIAGNOSIS — I48.91 ATRIAL FIBRILLATION, UNSPECIFIED TYPE (H): ICD-10-CM

## 2023-02-10 DIAGNOSIS — E78.5 HYPERLIPIDEMIA LDL GOAL <100: Primary | ICD-10-CM

## 2023-02-10 DIAGNOSIS — N18.30 STAGE 3 CHRONIC KIDNEY DISEASE, UNSPECIFIED WHETHER STAGE 3A OR 3B CKD (H): ICD-10-CM

## 2023-02-10 DIAGNOSIS — N18.30 CKD (CHRONIC KIDNEY DISEASE) STAGE 3, GFR 30-59 ML/MIN (H): Primary | ICD-10-CM

## 2023-02-10 DIAGNOSIS — I48.91 ATRIAL FIBRILLATION, UNSPECIFIED TYPE (H): Primary | ICD-10-CM

## 2023-02-10 DIAGNOSIS — I48.0 PAROXYSMAL ATRIAL FIBRILLATION (H): ICD-10-CM

## 2023-02-10 LAB — INR BLD: 2.7 (ref 0.9–1.1)

## 2023-02-10 PROCEDURE — 85610 PROTHROMBIN TIME: CPT

## 2023-02-10 PROCEDURE — 36415 COLL VENOUS BLD VENIPUNCTURE: CPT

## 2023-02-10 NOTE — PROGRESS NOTES
Anticoagulation-Extended Recheck Request    Under [facility] Anticoagulation protocol, Anticoagulation team may extend INR recheck interval + 1 week for each stable in range INR to max of 6 weeks. Extended interval rechecks between 8-12 weeks for patients on stable maintenance therapy require approval from referring provider.    Anticoagulation clinic suggests consideration of extended intervals in medically stable patients, with standard INR goals, and no change in warfarin dose for last 4-6 months    Jenn Joseph, 77 year old, female has been on:    Current recheck interval: 6 week  Compliant: Yes  Stable warfarin dose since: 9/22  INR Goal: 2.0-3.0  Time in Therapeutic range: 79%    Lab Results   Component Value Date    INR 2.7 02/10/2023    INR 2.8 12/30/2022    INR 2.6 11/15/2022    INR 2.4 10/04/2022    INR 2.6 09/14/2022    INR 1.8 08/30/2022    INR 2.6 07/19/2022    INR 2.2 06/22/2022    INR 2.48 04/12/2022    INR 2.40 09/08/2021    INR 2.70 07/06/2021    INR 2.80 05/25/2021    INR 2.60 04/20/2021    INR 2.50 04/06/2021    INR 1.90 03/23/2021    INR 2.60 03/02/2021    INR 3.10 02/16/2021    INR 2.90 02/02/2021         Please advise if Jenn is approved to have extended interval rechecks every 8-12 weeks, extending + 2 weeks after each additional therapeutic result to max of 12 weeks while on stable maintenance therapy    Thank you,    Shabnam Raygoza, RN

## 2023-02-10 NOTE — PROGRESS NOTES
ANTICOAGULATION MANAGEMENT     Jenn Joseph 77 year old female is on warfarin with therapeutic INR result. (Goal INR 2.0-3.0)    Recent labs: (last 7 days)     02/10/23  0953   INR 2.7*       ASSESSMENT       Source(s): Chart Review and Patient/Caregiver Call       Warfarin doses taken: Warfarin taken as instructed    Diet: No new diet changes identified    New illness, injury, or hospitalization: Yes: working with sore knee    Medication/supplement changes: None noted    Signs or symptoms of bleeding or clotting: No    Previous INR: Therapeutic last 2(+) visits    Additional findings: None       PLAN     Recommended plan for no diet, medication or health factor changes affecting INR     Dosing Instructions: Continue your current warfarin dose with next INR in 6 weeks       Summary  As of 2/10/2023    Full warfarin instructions:  1 mg every Mon, Wed, Fri; 2 mg all other days   Next INR check:  3/24/2023             Telephone call with Jenn who verbalizes understanding and agrees to plan    Lab visit scheduled    Education provided:     Please call back if any changes to your diet, medications or how you've been taking warfarin    Goal range and lab monitoring: goal range and significance of current result, Importance of therapeutic range, Importance of following up at instructed interval and will ask about extended checks    Plan made per [facility] anticoagulation protocol    Shabnam Raygoza RN  Anticoagulation Clinic  2/10/2023    _______________________________________________________________________     Anticoagulation Episode Summary     Current INR goal:  2.0-3.0   TTR:  79.0 % (1 y)   Target end date:  Indefinite   Send INR reminders to:  ELIZABETH CROUCH    Indications    Atrial fibrillation  unspecified type (H) [I48.91]  Chronic atrial fibrillation (H) (Resolved) [I48.20]           Comments:           Anticoagulation Care Providers     Provider Role Specialty Phone number    Drake Lucas MD  [facility] Internal Medicine [phone]

## 2023-02-10 NOTE — TELEPHONE ENCOUNTER
FYI - Status Update    Who is Calling: patient    Update: Patient would like to know if Dr would like to have her annual blood lab done on the same day as the INR on 04/06/2023 that way Dr will be able to receive the results before patient's annual appt on 04/21/2023.    Patient would like to know asap.    Does caller want a call/response back: Yes     Could we send this information to you in [employer] or would you prefer to receive a phone call?:   Patient would prefer a phone call   Okay to leave a detailed message?: Yes at Home number on file [phone] (home)

## 2023-02-13 NOTE — TELEPHONE ENCOUNTER
Called and spoke to the patient and gave her Dr Lucas's message.  Sunshine Matson MA  [facility]  2nd Floor  Primary Care

## 2023-02-28 ENCOUNTER — DOCUMENTATION ONLY (OUTPATIENT)
Dept: ANTICOAGULATION | Facility: CLINIC | Age: 78
End: 2023-02-28

## 2023-02-28 DIAGNOSIS — I48.0 PAROXYSMAL ATRIAL FIBRILLATION (H): Primary | ICD-10-CM

## 2023-02-28 NOTE — PROGRESS NOTES
ANTICOAGULATION CLINIC REFERRAL RENEWAL REQUEST       An annual renewal order is required for all patients referred to [facility].?  Please review and sign the pended referral order for Jenn Joseph.       ANTICOAGULATION SUMMARY      Warfarin indication(s)   Atrial Fibrillation    Mechanical heart valve present?  NO       Current goal range   INR: 2.0-3.0     Goal appropriate for indication? Goal INR 2-3, standard for indication(s) above     Time in Therapeutic Range (TTR)  (Goal > 60%) 79.0%       Office visit with referring provider's group within last year yes on 1/11/2023       Berta Vincent RN  [facility]

## 2023-04-06 ENCOUNTER — ANTICOAGULATION THERAPY VISIT (OUTPATIENT)
Dept: ANTICOAGULATION | Facility: CLINIC | Age: 78
End: 2023-04-06

## 2023-04-06 ENCOUNTER — APPOINTMENT (OUTPATIENT)
Dept: LAB | Facility: CLINIC | Age: 78
End: 2023-04-06
Payer: COMMERCIAL

## 2023-04-06 ENCOUNTER — LAB (OUTPATIENT)
Dept: LAB | Facility: CLINIC | Age: 78
End: 2023-04-06
Payer: COMMERCIAL

## 2023-04-06 DIAGNOSIS — N18.30 STAGE 3 CHRONIC KIDNEY DISEASE, UNSPECIFIED WHETHER STAGE 3A OR 3B CKD (H): ICD-10-CM

## 2023-04-06 DIAGNOSIS — I48.91 ATRIAL FIBRILLATION, UNSPECIFIED TYPE (H): Primary | ICD-10-CM

## 2023-04-06 DIAGNOSIS — E78.5 HYPERLIPIDEMIA LDL GOAL <100: ICD-10-CM

## 2023-04-06 DIAGNOSIS — I48.91 ATRIAL FIBRILLATION, UNSPECIFIED TYPE (H): ICD-10-CM

## 2023-04-06 DIAGNOSIS — I48.0 PAROXYSMAL ATRIAL FIBRILLATION (H): ICD-10-CM

## 2023-04-06 LAB
ALT SERPL W P-5'-P-CCNC: 25 U/L (ref 0–50)
ANION GAP SERPL CALCULATED.3IONS-SCNC: 2 MMOL/L (ref 3–14)
BASOPHILS # BLD AUTO: 0 10E3/UL (ref 0–0.2)
BASOPHILS NFR BLD AUTO: 1 %
BUN SERPL-MCNC: 24 MG/DL (ref 7–30)
CALCIUM SERPL-MCNC: 9.4 MG/DL (ref 8.5–10.1)
CHLORIDE BLD-SCNC: 105 MMOL/L (ref 94–109)
CHOLEST SERPL-MCNC: 188 MG/DL
CO2 SERPL-SCNC: 30 MMOL/L (ref 20–32)
CREAT SERPL-MCNC: 1.06 MG/DL (ref 0.52–1.04)
EOSINOPHIL # BLD AUTO: 0.1 10E3/UL (ref 0–0.7)
EOSINOPHIL NFR BLD AUTO: 2 %
ERYTHROCYTE [DISTWIDTH] IN BLOOD BY AUTOMATED COUNT: 12.9 % (ref 10–15)
FASTING STATUS PATIENT QL REPORTED: NO
GFR SERPL CREATININE-BSD FRML MDRD: 54 ML/MIN/1.73M2
GLUCOSE BLD-MCNC: 58 MG/DL (ref 70–99)
HCT VFR BLD AUTO: 45 % (ref 35–47)
HDLC SERPL-MCNC: 67 MG/DL
HGB BLD-MCNC: 15 G/DL (ref 11.7–15.7)
IMM GRANULOCYTES # BLD: 0 10E3/UL
IMM GRANULOCYTES NFR BLD: 0 %
INR BLD: 2.3 (ref 0.9–1.1)
LDLC SERPL CALC-MCNC: 96 MG/DL
LYMPHOCYTES # BLD AUTO: 1.9 10E3/UL (ref 0.8–5.3)
LYMPHOCYTES NFR BLD AUTO: 30 %
MCH RBC QN AUTO: 30.4 PG (ref 26.5–33)
MCHC RBC AUTO-ENTMCNC: 33.3 G/DL (ref 31.5–36.5)
MCV RBC AUTO: 91 FL (ref 78–100)
MONOCYTES # BLD AUTO: 0.6 10E3/UL (ref 0–1.3)
MONOCYTES NFR BLD AUTO: 9 %
NEUTROPHILS # BLD AUTO: 3.8 10E3/UL (ref 1.6–8.3)
NEUTROPHILS NFR BLD AUTO: 59 %
NONHDLC SERPL-MCNC: 121 MG/DL
PLATELET # BLD AUTO: 154 10E3/UL (ref 150–450)
POTASSIUM BLD-SCNC: 4.8 MMOL/L (ref 3.4–5.3)
RBC # BLD AUTO: 4.93 10E6/UL (ref 3.8–5.2)
SODIUM SERPL-SCNC: 137 MMOL/L (ref 133–144)
TRIGL SERPL-MCNC: 124 MG/DL
WBC # BLD AUTO: 6.5 10E3/UL (ref 4–11)

## 2023-04-06 PROCEDURE — 82570 ASSAY OF URINE CREATININE: CPT

## 2023-04-06 PROCEDURE — 80061 LIPID PANEL: CPT

## 2023-04-06 PROCEDURE — 84460 ALANINE AMINO (ALT) (SGPT): CPT

## 2023-04-06 PROCEDURE — 85610 PROTHROMBIN TIME: CPT

## 2023-04-06 PROCEDURE — 85025 COMPLETE CBC W/AUTO DIFF WBC: CPT

## 2023-04-06 PROCEDURE — 80048 BASIC METABOLIC PNL TOTAL CA: CPT

## 2023-04-06 PROCEDURE — 36415 COLL VENOUS BLD VENIPUNCTURE: CPT

## 2023-04-06 PROCEDURE — 82043 UR ALBUMIN QUANTITATIVE: CPT

## 2023-04-06 NOTE — PROGRESS NOTES
ANTICOAGULATION MANAGEMENT     Jenn Joseph 78 year old female is on warfarin with therapeutic INR result. (Goal INR 2.0-3.0)    Recent labs: (last 7 days)     04/06/23  0956   INR 2.3*       ASSESSMENT       Source(s): Chart Review and Patient/Caregiver Call       Warfarin doses taken: Warfarin taken as instructed    Diet: No new diet changes identified    New illness, injury, or hospitalization: No    Medication/supplement changes: None noted    Signs or symptoms of bleeding or clotting: No    Previous INR: Therapeutic last 2(+) visits    Additional findings: None         PLAN     Recommended plan for no diet, medication or health factor changes affecting INR     Dosing Instructions: Continue your current warfarin dose with next INR in 7 weeks       Summary  As of 4/6/2023    Full warfarin instructions:  1 mg every Mon, Wed, Fri; 2 mg all other days   Next INR check:  5/25/2023             Telephone call with Jenn who verbalizes understanding and agrees to plan    Lab visit scheduled    Education provided:     Goal range and lab monitoring: goal range and significance of current result    Plan made per [facility] anticoagulation protocol    Marisela Josue RN  Anticoagulation Clinic  4/6/2023    _______________________________________________________________________     Anticoagulation Episode Summary     Current INR goal:  2.0-3.0   TTR:  89.6 % (1 y)   Target end date:  Indefinite   Send INR reminders to:  ELIZABETH CROUCH    Indications    Atrial fibrillation  unspecified type (H) [I48.91]  Chronic atrial fibrillation (H) (Resolved) [I48.20]           Comments:           Anticoagulation Care Providers     Provider Role Specialty Phone number    Drake Lucas MD Referring Internal Medicine [phone]

## 2023-04-07 LAB
CREAT UR-MCNC: 107 MG/DL
MICROALBUMIN UR-MCNC: 9 MG/L
MICROALBUMIN/CREAT UR: 8.41 MG/G CR (ref 0–25)

## 2023-04-21 ENCOUNTER — OFFICE VISIT (OUTPATIENT)
Dept: FAMILY MEDICINE | Facility: CLINIC | Age: 78
End: 2023-04-21
Payer: COMMERCIAL

## 2023-04-21 VITALS
WEIGHT: 135 LBS | HEART RATE: 65 BPM | DIASTOLIC BLOOD PRESSURE: 80 MMHG | TEMPERATURE: 97.7 F | OXYGEN SATURATION: 96 % | HEIGHT: 62 IN | BODY MASS INDEX: 24.84 KG/M2 | RESPIRATION RATE: 22 BRPM | SYSTOLIC BLOOD PRESSURE: 139 MMHG

## 2023-04-21 DIAGNOSIS — I48.0 PAROXYSMAL ATRIAL FIBRILLATION (H): ICD-10-CM

## 2023-04-21 DIAGNOSIS — Z00.00 ENCOUNTER FOR ANNUAL WELLNESS EXAM IN MEDICARE PATIENT: Primary | ICD-10-CM

## 2023-04-21 DIAGNOSIS — I10 HYPERTENSION GOAL BP (BLOOD PRESSURE) < 140/90: ICD-10-CM

## 2023-04-21 DIAGNOSIS — K21.9 GASTROESOPHAGEAL REFLUX DISEASE WITHOUT ESOPHAGITIS: ICD-10-CM

## 2023-04-21 PROCEDURE — G0439 PPPS, SUBSEQ VISIT: HCPCS | Performed by: INTERNAL MEDICINE

## 2023-04-21 RX ORDER — ATENOLOL 25 MG/1
12.5 TABLET ORAL DAILY
Qty: 45 TABLET | Refills: 3 | Status: SHIPPED | OUTPATIENT
Start: 2023-04-21 | End: 2024-04-23

## 2023-04-21 RX ORDER — LISINOPRIL 5 MG/1
5 TABLET ORAL DAILY
Qty: 90 TABLET | Refills: 3 | Status: SHIPPED | OUTPATIENT
Start: 2023-04-21 | End: 2024-04-19

## 2023-04-21 RX ORDER — FAMOTIDINE 20 MG/1
20 TABLET, FILM COATED ORAL 2 TIMES DAILY WITH MEALS
Qty: 180 TABLET | Refills: 3 | Status: SHIPPED | OUTPATIENT
Start: 2023-04-21 | End: 2024-05-03

## 2023-04-21 RX ORDER — PROPAFENONE HYDROCHLORIDE 425 MG/1
425 CAPSULE, EXTENDED RELEASE ORAL 2 TIMES DAILY
Qty: 180 CAPSULE | Refills: 3 | Status: SHIPPED | OUTPATIENT
Start: 2023-04-21 | End: 2024-04-23

## 2023-04-21 ASSESSMENT — ENCOUNTER SYMPTOMS
PARESTHESIAS: 0
JOINT SWELLING: 0
HEARTBURN: 0
MYALGIAS: 0
ARTHRALGIAS: 0
HEMATURIA: 0
COUGH: 0
WEAKNESS: 0
NAUSEA: 0
DYSURIA: 0
EYE PAIN: 0
NERVOUS/ANXIOUS: 0
FREQUENCY: 0
FEVER: 0
DIZZINESS: 0
SHORTNESS OF BREATH: 0
SORE THROAT: 0
ABDOMINAL PAIN: 0
HEADACHES: 0
DIARRHEA: 0
CHILLS: 0
BREAST MASS: 0
PALPITATIONS: 0
CONSTIPATION: 0
HEMATOCHEZIA: 0

## 2023-04-21 ASSESSMENT — ACTIVITIES OF DAILY LIVING (ADL): CURRENT_FUNCTION: NO ASSISTANCE NEEDED

## 2023-04-21 ASSESSMENT — PAIN SCALES - GENERAL: PAINLEVEL: NO PAIN (0)

## 2023-04-21 NOTE — PATIENT INSTRUCTIONS
At [facility], we strive to deliver an exceptional experience to you, every time we see you. If you receive a survey, please complete it as we do value your feedback.  If you have MyChart, you can expect to receive results automatically within 24 hours of their completion.  Your provider will send a note interpreting your results as well.   If you do not have MyChart, you should receive your results in about a week by mail.    Your care team:                            Family Medicine Internal Medicine   MD Drake Denise MD Shantel Branch-Fleming, MD Srinivasa Vaka, MD Katya Belousova, PACHOLO Carcamo CNP, MD ([facility]   Sunny Jin, MD Rebecca Pack MD Amelia Massimini APRN HARJIT Barney APRN MD Mayte Liu MD          Clinic hours: Monday - Thursday 7 am-6 pm; Fridays 7 am-5 pm.   Urgent care: Monday - Friday 10 am- 8 pm; Saturday and Sunday 9 am-5 pm.    Clinic: [phone]       [facility]: Monday - Thursday 8 am - 7 pm; Friday 8 am - 6 pm  [facility]: [phone]

## 2023-04-21 NOTE — PROGRESS NOTES
"SUBJECTIVE:   Jenn is a 78 year old who presents for Preventive Visit.  Patient has been advised of split billing requirements and indicates understanding: Yes  Are you in the first 12 months of your Medicare coverage?  No    Healthy Habits:     In general, how would you rate your overall health?  Good    Frequency of exercise:  6-7 days/week    Duration of exercise:  15-30 minutes    Do you usually eat at least 4 servings of fruit and vegetables a day, include whole grains    & fiber and avoid regularly eating high fat or \"junk\" foods?  No    Taking medications regularly:  Yes    Barriers to taking medications:  None    Medication side effects:  None    Ability to successfully perform activities of daily living:  No assistance needed    Home Safety:  No safety concerns identified    Hearing Impairment:  No hearing concerns    In the past 6 months, have you been bothered by leaking of urine? Yes    In general, how would you rate your overall mental or emotional health?  Excellent      PHQ-2 Total Score: 0    Additional concerns today:  Yes      Have you ever done Advance Care Planning? (For example, a Health Directive, POLST, or a discussion with a medical provider or your loved ones about your wishes): Yes, patient states has an Advance Care Planning document and will bring a copy to the clinic.     Updates:  -Travelling on a cruise line  -Updated Warfarin Rx due to dose changes.      Fall risk  Fallen 2 or more times in the past year?: No  Any fall with injury in the past year?: No    Cognitive Screening   1) Repeat 3 items (Leader, Season, Table)    2) Clock draw: NORMAL  3) 3 item recall: Recalls 3 objects  Results: 3 items recalled: COGNITIVE IMPAIRMENT LESS LIKELY    Mini-CogTM Copyright KADE Ivy. Licensed by the author for use in [facility]; reprinted with permission (jaz@.[facility]). All rights reserved.      Do you have sleep apnea, excessive snoring or daytime drowsiness?: no    Reviewed " and updated as needed this visit by clinical staff   Tobacco  Allergies  Meds              Reviewed and updated as needed this visit by Provider                 Social History     Tobacco Use     Smoking status: Former     Passive exposure: Never     Smokeless tobacco: Never   Vaping Use     Vaping status: Never Used   Substance Use Topics     Alcohol use: Yes     Comment: 1 - 2 drinks PER WEEK          View : No data to display.              Do you have a current opioid prescription? No  Do you use any other controlled substances or medications that are not prescribed by a provider? None    Current providers sharing in care for this patient include:   Patient Care Team:  Drake Lucas MD as PCP - General (Internal Medicine)  Drake Lucas MD as Assigned PCP  Konstantin Costa DO as Assigned Musculoskeletal Provider  Max Toledo DPM as Assigned Surgical Provider    The following health maintenance items are reviewed in Epic and correct as of today:  Health Maintenance   Topic Date Due     LUNG CANCER SCREENING  01/07/2011     DTAP/TDAP/TD IMMUNIZATION (1 - Tdap) 10/04/2012     ANNUAL REVIEW OF HM ORDERS  04/20/2023     MEDICARE ANNUAL WELLNESS VISIT  04/12/2023     BMP  04/06/2024     LIPID  04/06/2024     MICROALBUMIN  04/06/2024     HEMOGLOBIN  04/06/2024     FALL RISK ASSESSMENT  04/21/2024     DEXA  01/17/2027     ADVANCE CARE PLANNING  04/24/2027     HEPATITIS C SCREENING  Completed     PHQ-2 (once per calendar year)  Completed     INFLUENZA VACCINE  Completed     Pneumococcal Vaccine: 65+ Years  Completed     URINALYSIS  Completed     ZOSTER IMMUNIZATION  Completed     COVID-19 Vaccine  Completed     IPV IMMUNIZATION  Aged Out     MENINGITIS IMMUNIZATION  Aged Out     MAMMO SCREENING  Discontinued     COLORECTAL CANCER SCREENING  Discontinued     Labs reviewed in EPIC  BP Readings from Last 3 Encounters:   04/21/23 (!) 156/82   01/11/23 120/79   01/06/23 (!) 124/93    Wt Readings  from Last 3 Encounters:   04/21/23 61.2 kg (135 lb)   01/11/23 61.7 kg (136 lb)   01/06/23 61.7 kg (136 lb)                  Patient Active Problem List   Diagnosis     GERD (gastroesophageal reflux disease)     CARDIOVASCULAR SCREENING; LDL GOAL LESS THAN 130     Hypertension, goal below 140/90     Long-term (current) use of anticoagulants, INR goal 2.0-3.0     Advanced directives, counseling/discussion     CKD (chronic kidney disease) stage 3, GFR 30-59 ml/min (H)     Health Care Home     Seborrhea     Eyelid dermatitis, eczematous     Uterine prolapse     Paroxysmal atrial fibrillation (H)     Pure hypercholesterolemia     Atrial fibrillation, unspecified type (H)     Cystocele with uterine prolapse     Essential hypertension, benign     Hyperlipidemia LDL goal <100     Bilateral low back pain with left-sided sciatica     Sprain of lateral collateral ligament of left knee, initial encounter     Lateral meniscal pain, left      Past Surgical History:   Procedure Laterality Date     D & C  80'S     HYSTERECTOMY  11-5-15     ROTATOR CUFF REPAIR RT/LT  2007    LEFT     TUBAL LIGATION  80'S       Social History     Tobacco Use     Smoking status: Former     Passive exposure: Never     Smokeless tobacco: Never   Vaping Use     Vaping status: Never Used   Substance Use Topics     Alcohol use: Yes     Comment: 1 - 2 drinks PER WEEK     Family History   Problem Relation Age of Onset     Hypertension Mother      Cerebrovascular Disease Mother      Thyroid Disease Mother      Cerebrovascular Disease Maternal Grandfather      Hypertension Brother      Musculoskeletal Disorder Brother         FIBROMYALGIA     Heart Disease Brother      Cardiovascular Brother         ATRIAL FIB     Musculoskeletal Disorder Sister         FIBROMYALGIA     Thyroid Disease Sister      Allergies Son      Heart Disease Brother      Cardiovascular Brother      Heart Disease Brother      Cardiovascular Brother      Heart Disease Brother       Cardiovascular Brother      Heart Disease Father      Cardiovascular Brother         ATRIAL FIB     Cardiovascular Brother         ATRIAL FIB     Cardiovascular Brother         ATRIAL FIB     Cardiovascular Brother         ATRIAL FIB         Allergies   Allergen Reactions     Diflucan [Fluconazole] Rash     NOT EXACTLY SURE     Lamisil Rash     Terbinafine Rash     Recent Labs   Lab Test 04/06/23  0956 04/12/22  1457 10/28/21  1453 10/28/21  1453 05/25/21  0957 12/03/20  1410 11/24/20  0957   LDL 96 74  --  97  --    < >  --    HDL 67 58  --  65  --    < >  --    TRIG 124 191*  --  153*  --    < >  --    ALT 25 27  --  33 32  --  36   CR 1.06* 0.98   < > 0.93 1.06*  --  1.05*   GFRESTIMATED 54* 59*   < > 60* 51*  --  52*   GFRESTBLACK  --   --   --   --  59*  --  60*   POTASSIUM 4.8 3.9   < > 3.9 4.3  --  4.5    < > = values in this interval not displayed.      Pertinent mammograms are reviewed under the imaging tab.    Review of Systems   Constitutional: Negative for chills and fever.   HENT: Negative for congestion, ear pain, hearing loss and sore throat.    Eyes: Negative for pain and visual disturbance.   Respiratory: Negative for cough and shortness of breath.    Cardiovascular: Negative for chest pain, palpitations and peripheral edema.   Gastrointestinal: Negative for abdominal pain, constipation, diarrhea, heartburn, hematochezia and nausea.   Breasts:  Negative for tenderness, breast mass and discharge.   Genitourinary: Negative for dysuria, frequency, genital sores, hematuria, pelvic pain, urgency, vaginal bleeding and vaginal discharge.   Musculoskeletal: Negative for arthralgias, joint swelling and myalgias.   Skin: Negative for rash.   Neurological: Negative for dizziness, weakness, headaches and paresthesias.   Psychiatric/Behavioral: Negative for mood changes. The patient is not nervous/anxious.        OBJECTIVE:   BP (!) 156/82 (BP Location: Left arm, Patient Position: Sitting, Cuff Size: Adult  "Regular)   Pulse 65   Temp 97.7  F (36.5  C) (Tympanic)   Resp 22   Ht 1.57 m (5' 1.81\")   Wt 61.2 kg (135 lb)   SpO2 96%   BMI 24.84 kg/m   Estimated body mass index is 24.84 kg/m  as calculated from the following:    Height as of this encounter: 1.57 m (5' 1.81\").    Weight as of this encounter: 61.2 kg (135 lb).  Physical Exam  GENERAL: healthy, alert and no distress  EYES: Eyes grossly normal to inspection and conjunctivae and sclerae normal  HENT: normal cephalic/atraumatic, both ears:   NECK: no adenopathy and thyroid normal to palpation  RESP: lungs clear to auscultation - no rales, rhonchi or wheezes  CV: regular rate and rhythm, normal S1 S2, no S3 or S4, no murmur, click or rub, no peripheral edema and peripheral pulses strong  ABDOMEN: soft, nontender, no hepatosplenomegaly, no masses and bowel sounds normal  MS: no gross musculoskeletal defects noted, no edema  NEURO: Normal strength and tone, mentation intact and speech normal  PSYCH: mentation appears normal, affect normal/bright    Diagnostic Test Results:  No results found for any visits on 04/21/23.    ASSESSMENT / PLAN:     Encounter for annual wellness exam in Medicare patient  - REVIEW OF HEALTH MAINTENANCE PROTOCOL ORDERS    Hypertension goal BP (blood pressure) < 140/90  - REVIEW OF HEALTH MAINTENANCE PROTOCOL ORDERS  - lisinopril (ZESTRIL) 5 MG tablet; Take 1 tablet (5 mg) by mouth daily  - propafenone (RYTHMOL SR) 425 MG 12 hr capsule; Take 1 capsule (425 mg) by mouth 2 times daily  - atenolol (TENORMIN) 25 MG tablet; Take 0.5 tablets (12.5 mg) by mouth daily    Paroxysmal atrial fibrillation (H)  - REVIEW OF HEALTH MAINTENANCE PROTOCOL ORDERS  - atenolol (TENORMIN) 25 MG tablet; Take 0.5 tablets (12.5 mg) by mouth daily    Gastroesophageal reflux disease without esophagitis  - famotidine (PEPCID) 20 MG tablet; Take 1 tablet (20 mg) by mouth 2 times daily (with meals)  - omeprazole (PRILOSEC) 20 MG DR capsule; Take 1 capsule (20 mg) by " mouth At Bedtime      Patient has been advised of split billing requirements and indicates understanding: Yes      COUNSELING:  Special attention given to:       Regular exercise       Healthy diet/nutrition       The 10-year ASCVD risk score (Alyce FALCON, et al., 2019) is: 33.3%    Values used to calculate the score:      Age: 78 years      Sex: Female      Is Non- : No      Diabetic: No      Tobacco smoker: No      Systolic Blood Pressure: 139 mmHg      Is BP treated: Yes      HDL Cholesterol: 67 mg/dL      Total Cholesterol: 188 mg/dL        She reports that she has quit smoking. She has never been exposed to tobacco smoke. She has never used smokeless tobacco.      Appropriate preventive services were discussed with this patient, including applicable screening as appropriate for cardiovascular disease, diabetes, osteopenia/osteoporosis, and glaucoma.  As appropriate for age/gender, discussed screening for colorectal cancer, prostate cancer, breast cancer, and cervical cancer. Checklist reviewing preventive services available has been given to the patient.    Reviewed patients plan of care and provided an AVS. The Basic Care Plan (routine screening as documented in Health Maintenance) for Jenn meets the Care Plan requirement. This Care Plan has been established and reviewed with the Patient.      Drake Lucas MD  [facility]    Identified Health Risks:  None.

## 2023-05-03 ENCOUNTER — TELEPHONE (OUTPATIENT)
Dept: FAMILY MEDICINE | Facility: CLINIC | Age: 78
End: 2023-05-03
Payer: COMMERCIAL

## 2023-05-03 DIAGNOSIS — I48.0 PAROXYSMAL ATRIAL FIBRILLATION (H): ICD-10-CM

## 2023-05-03 DIAGNOSIS — Z79.01 LONG-TERM (CURRENT) USE OF ANTICOAGULANTS, INR GOAL 2.0-3.0: ICD-10-CM

## 2023-05-03 RX ORDER — WARFARIN SODIUM 1 MG/1
TABLET ORAL
Qty: 145 TABLET | Refills: 1 | Status: SHIPPED | OUTPATIENT
Start: 2023-05-03 | End: 2023-11-03

## 2023-05-03 NOTE — TELEPHONE ENCOUNTER
Pt requested for her warfarin prescription to be updated. Confirmed dose.    ANTICOAGULATION MANAGEMENT:  Medication Refill    Anticoagulation Summary  As of 4/6/2023    Warfarin maintenance plan:  1 mg (1 mg x 1) every Mon, Wed, Fri; 2 mg (1 mg x 2) all other days   Next INR check:  5/25/2023   Target end date:  Indefinite    Indications    Atrial fibrillation  unspecified type (H) [I48.91]  Chronic atrial fibrillation (H) (Resolved) [I48.20]             Anticoagulation Care Providers     Provider Role Specialty Phone number    Drake Lucas MD Referring Internal Medicine [phone]          Visit with referring provider/group within last year: Yes    [facility] referral signed within last year: Yes    Jenn meets all criteria for refill (current [facility] referral, office visit with referring provider/group in last year, lab monitoring up to date or not exceeding 2 weeks overdue). Rx instructions and quantity supplied updated to match patient's current dosing plan. Warfarin 90 day supply with 1 refill granted per [facility] protocol     Patient verbalizes understanding and agrees to plan. No further questions or concerns.  Miranda Abreu RN on 5/3/2023 at 3:12 PM

## 2023-05-03 NOTE — TELEPHONE ENCOUNTER
Patient Returning Call    Reason for call:  Patient requesting to speak about INR    Information relayed to patient:  RN to return call    Patient has additional questions:  Yes        Could we [facility] this [facility] or would you prefer to receive a phone call?:   Patient would prefer a phone call   Okay to leave a detailed message?: Yes at Cell number on file:    Telephone Information:   Mobile [phone]     Nubia Delgado

## 2023-05-03 NOTE — TELEPHONE ENCOUNTER
Patient looking to speak with chidi RN, will route encounter to anticojustus team      ANINE MetzgerN, RN  [facility]

## 2023-05-15 ENCOUNTER — DOCUMENTATION ONLY (OUTPATIENT)
Dept: OTHER | Facility: CLINIC | Age: 78
End: 2023-05-15
Payer: COMMERCIAL

## 2023-05-24 ENCOUNTER — LAB (OUTPATIENT)
Dept: LAB | Facility: CLINIC | Age: 78
End: 2023-05-24
Payer: COMMERCIAL

## 2023-05-24 ENCOUNTER — ANTICOAGULATION THERAPY VISIT (OUTPATIENT)
Dept: ANTICOAGULATION | Facility: CLINIC | Age: 78
End: 2023-05-24

## 2023-05-24 DIAGNOSIS — I48.0 PAROXYSMAL ATRIAL FIBRILLATION (H): ICD-10-CM

## 2023-05-24 DIAGNOSIS — I48.91 ATRIAL FIBRILLATION, UNSPECIFIED TYPE (H): Primary | ICD-10-CM

## 2023-05-24 LAB — INR BLD: 2 (ref 0.9–1.1)

## 2023-05-24 PROCEDURE — 85610 PROTHROMBIN TIME: CPT

## 2023-05-24 PROCEDURE — 36416 COLLJ CAPILLARY BLOOD SPEC: CPT

## 2023-05-24 NOTE — PROGRESS NOTES
ANTICOAGULATION MANAGEMENT     Jenn Joseph 78 year old female is on warfarin with therapeutic INR result. (Goal INR 2.0-3.0)    Recent labs: (last 7 days)     05/24/23  0955   INR 2.0*       ASSESSMENT       Source(s): Chart Review and Patient/Caregiver Call       Warfarin doses taken: Warfarin taken as instructed    Diet: No new diet changes identified    Medication/supplement changes: Prescribed omeprazole on 4/21/23, per Micromedex potential for interaction with warfarin but pt states that she is not taking the medication daily only PRN, 1-2 times per week.     New illness, injury, or hospitalization: Yes: She had a fall a couple days ago, scraped her elbow but other than that she states feel good.     Signs or symptoms of bleeding or clotting: No    Previous result: Therapeutic last 2(+) visits    Additional findings: None         PLAN     Recommended plan for no diet, medication or health factor changes affecting INR     Dosing Instructions: Continue your current warfarin dose with next INR in 8 weeks       Summary  As of 5/24/2023    Full warfarin instructions:  1 mg every Mon, Wed, Fri; 2 mg all other days   Next INR check:  7/18/2023             Telephone call with Jenn who verbalizes understanding and agrees to plan    Lab visit scheduled    Education provided:     Goal range and lab monitoring: goal range and significance of current result and Importance of therapeutic range    Plan made per [facility] anticoagulation protocol    Erik Fermin RN  Anticoagulation Clinic  5/24/2023    _______________________________________________________________________     Anticoagulation Episode Summary     Current INR goal:  2.0-3.0   TTR:  95.5 % (1 y)   Target end date:  Indefinite   Send INR reminders to:  ELIZABETH CROUCH    Indications    Atrial fibrillation  unspecified type (H) [I48.91]  Chronic atrial fibrillation (H) (Resolved) [I48.20]  Paroxysmal atrial fibrillation (H) [I48.0]           Comments:            Anticoagulation Care Providers     Provider Role Specialty Phone number    Drake Lucas MD Referring Internal Medicine [phone]

## 2023-07-10 NOTE — TELEPHONE ENCOUNTER
Last Office Visit 4/17/23  Next Office Visit 10/23/23   Recommend appointment with any provider within 3 days.

## 2023-07-18 ENCOUNTER — ANTICOAGULATION THERAPY VISIT (OUTPATIENT)
Dept: ANTICOAGULATION | Facility: CLINIC | Age: 78
End: 2023-07-18

## 2023-07-18 ENCOUNTER — LAB (OUTPATIENT)
Dept: LAB | Facility: CLINIC | Age: 78
End: 2023-07-18
Payer: COMMERCIAL

## 2023-07-18 DIAGNOSIS — I48.0 PAROXYSMAL ATRIAL FIBRILLATION (H): ICD-10-CM

## 2023-07-18 DIAGNOSIS — I48.91 ATRIAL FIBRILLATION, UNSPECIFIED TYPE (H): Primary | ICD-10-CM

## 2023-07-18 LAB — INR BLD: 2.1 (ref 0.9–1.1)

## 2023-07-18 PROCEDURE — 85610 PROTHROMBIN TIME: CPT

## 2023-07-18 PROCEDURE — 36416 COLLJ CAPILLARY BLOOD SPEC: CPT

## 2023-07-18 NOTE — PROGRESS NOTES
ANTICOAGULATION MANAGEMENT     Jenn Joseph 78 year old female is on warfarin with therapeutic INR result. (Goal INR 2.0-3.0)    Recent labs: (last 7 days)     07/18/23  1013   INR 2.1*       ASSESSMENT       Source(s): Chart Review and Patient/Caregiver Call       Warfarin doses taken: Warfarin taken as instructed    Diet: No new diet changes identified    Medication/supplement changes: None noted    New illness, injury, or hospitalization: No    Signs or symptoms of bleeding or clotting: No    Previous result: Therapeutic last 2(+) visits    Additional findings: None         PLAN     Recommended plan for no diet, medication or health factor changes affecting INR     Dosing Instructions: Continue your current warfarin dose with next INR in 10 weeks       Summary  As of 7/18/2023    Full warfarin instructions:  1 mg every Mon, Wed, Fri; 2 mg all other days   Next INR check:  9/26/2023             Telephone call with Jenn who verbalizes understanding and agrees to plan    Lab visit scheduled    Education provided:     Contact [phone]  with any changes, questions or concerns.     Plan made per [facility] anticoagulation protocol    Stella Yang RN  Anticoagulation Clinic  7/18/2023    _______________________________________________________________________     Anticoagulation Episode Summary     Current INR goal:  2.0-3.0   TTR:  96.1 % (1 y)   Target end date:  Indefinite   Send INR reminders to:  ELIZABETH CROUCH    Indications    Atrial fibrillation  unspecified type (H) [I48.91]  Chronic atrial fibrillation (H) (Resolved) [I48.20]  Paroxysmal atrial fibrillation (H) [I48.0]           Comments:           Anticoagulation Care Providers     Provider Role Specialty Phone number    Drake Lucas MD Referring Internal Medicine [phone]

## 2023-09-27 ENCOUNTER — ANTICOAGULATION THERAPY VISIT (OUTPATIENT)
Dept: ANTICOAGULATION | Facility: CLINIC | Age: 78
End: 2023-09-27

## 2023-09-27 ENCOUNTER — LAB (OUTPATIENT)
Dept: LAB | Facility: CLINIC | Age: 78
End: 2023-09-27
Payer: COMMERCIAL

## 2023-09-27 DIAGNOSIS — I48.0 PAROXYSMAL ATRIAL FIBRILLATION (H): ICD-10-CM

## 2023-09-27 DIAGNOSIS — I48.91 ATRIAL FIBRILLATION, UNSPECIFIED TYPE (H): Primary | ICD-10-CM

## 2023-09-27 LAB — INR BLD: 2.6 (ref 0.9–1.1)

## 2023-09-27 PROCEDURE — 36416 COLLJ CAPILLARY BLOOD SPEC: CPT

## 2023-09-27 PROCEDURE — 85610 PROTHROMBIN TIME: CPT

## 2023-09-27 NOTE — PROGRESS NOTES
ANTICOAGULATION MANAGEMENT     Jenn Joesph 78 year old female is on warfarin with therapeutic INR result. (Goal INR 2.0-3.0)    Recent labs: (last 7 days)     09/27/23  1006   INR 2.6*       ASSESSMENT     Source(s): Chart Review and Patient/Caregiver Call     Warfarin doses taken: Warfarin taken as instructed  Diet: No new diet changes identified  Medication/supplement changes: None noted  New illness, injury, or hospitalization: No  Signs or symptoms of bleeding or clotting: No  Previous result: Therapeutic last 2(+) visits  Additional findings: None     PLAN     Recommended plan for no diet, medication or health factor changes affecting INR     Dosing Instructions: Continue your current warfarin dose with next INR in 10 weeks       Summary  As of 9/27/2023      Full warfarin instructions:  1 mg every Mon, Wed, Fri; 2 mg all other days   Next INR check:  12/6/2023               Telephone call with Jenn who verbalizes understanding and agrees to plan    Lab visit scheduled    Education provided:   Please call back if any changes to your diet, medications or how you've been taking warfarin    Plan made per [facility] anticoagulation protocol    Miranda Abreu RN  Anticoagulation Clinic  9/27/2023    _______________________________________________________________________     Anticoagulation Episode Summary       Current INR goal:  2.0-3.0   TTR:  100.0 % (1 y)   Target end date:  Indefinite   Send INR reminders to:  ELIZABETH CROUCH    Indications    Atrial fibrillation  unspecified type (H) [I48.91]  Chronic atrial fibrillation (H) (Resolved) [I48.20]  Paroxysmal atrial fibrillation (H) [I48.0]             Comments:  okay for 8-12 week checks per 2/10/23 message             Anticoagulation Care Providers       Provider Role Specialty Phone number    VocalDrake MD Referring Internal Medicine [phone]

## 2023-10-26 ENCOUNTER — OFFICE VISIT (OUTPATIENT)
Dept: URGENT CARE | Facility: URGENT CARE | Age: 78
End: 2023-10-26
Payer: COMMERCIAL

## 2023-10-26 VITALS
SYSTOLIC BLOOD PRESSURE: 149 MMHG | WEIGHT: 136.5 LBS | HEART RATE: 79 BPM | OXYGEN SATURATION: 96 % | TEMPERATURE: 98.3 F | BODY MASS INDEX: 25.12 KG/M2 | DIASTOLIC BLOOD PRESSURE: 79 MMHG

## 2023-10-26 DIAGNOSIS — N18.30 STAGE 3 CHRONIC KIDNEY DISEASE, UNSPECIFIED WHETHER STAGE 3A OR 3B CKD (H): ICD-10-CM

## 2023-10-26 DIAGNOSIS — J06.9 VIRAL UPPER RESPIRATORY TRACT INFECTION WITH COUGH: Primary | ICD-10-CM

## 2023-10-26 DIAGNOSIS — Z79.01 LONG-TERM (CURRENT) USE OF ANTICOAGULANTS, INR GOAL 2.0-3.0: ICD-10-CM

## 2023-10-26 PROCEDURE — 99214 OFFICE O/P EST MOD 30 MIN: CPT | Performed by: PHYSICIAN ASSISTANT

## 2023-10-26 ASSESSMENT — ENCOUNTER SYMPTOMS
ALLERGIC/IMMUNOLOGIC NEGATIVE: 1
WOUND: 0
HEADACHES: 1
MYALGIAS: 0
CHILLS: 0
RHINORRHEA: 1
SHORTNESS OF BREATH: 0
ARTHRALGIAS: 0
EYES NEGATIVE: 1
ENDOCRINE NEGATIVE: 1
NECK STIFFNESS: 0
JOINT SWELLING: 0
VOMITING: 0
NAUSEA: 0
SINUS PAIN: 1
DIZZINESS: 0
FEVER: 0
NECK PAIN: 0
BACK PAIN: 0
WHEEZING: 0
COUGH: 1
CARDIOVASCULAR NEGATIVE: 1
PALPITATIONS: 0
MUSCULOSKELETAL NEGATIVE: 1
LIGHT-HEADEDNESS: 0
DIARRHEA: 0
SINUS PRESSURE: 1
WEAKNESS: 0
SORE THROAT: 0

## 2023-10-26 NOTE — PROGRESS NOTES
Chief Complaint:     Chief Complaint   Patient presents with    Sinus Problem     POSSIBLE SINUS INFECTION, SX FACIAL AND HEAD PAIN LAST FEW DAYS PT HAS TAKEN TYLENOL, PAIN THEN ON RIGHT SIDE OF FACE, THEN LEFT SIDE OF FACE, YESTERDAY IT HIT HER AROUND 10PM SHE TOOK TYLENOL AND WENT TO BED, PAIN WOKE UP HER, LOST OF SNEEZING, BLOWING HER NOSE, AND COUGHING        No results found for any visits on 10/26/23.    Medical Decision Making:    Vital signs reviewed by Mansoor Ames PA-C  BP (!) 149/79   Pulse 79   Temp 98.3  F (36.8  C) (Tympanic)   Wt 61.9 kg (136 lb 8 oz)   SpO2 96%   BMI 25.12 kg/m      Differential Diagnosis:  URI Adult/Peds:  Acute right otitis media, Acute left otitis media, Pneumonia, Sinusitis, and Viral upper respiratory illness        ASSESSMENT    1. Viral upper respiratory tract infection with cough    2. Stage 3 chronic kidney disease, unspecified whether stage 3a or 3b CKD (H)    3. Long-term (current) use of anticoagulants, INR goal 2.0-3.0        PLAN    Patient is in no acute distress.    Temp is 98.2 in clinic today, lung sounds were clear, and O2 sats at 96% on RA.    Discussed with patient her symptoms are most consistent with a viral upper respiratory infection. Recommend she continue OTC topical nasal corticosteroid to aid in decongestants.  Rest, Push fluids, vaporizer, elevation of head of bed.  Tylenol for any fever or body aches.  NSAIDS contraindicated with CKD, and anticoagulation.  Over the counter cough suppressant- PRN- as discussed.   If symptoms worsen, recheck immediately otherwise follow up with your PCP in 1 week if symptoms are not improving.  Worrisome symptoms discussed with instructions to go to the ED.  Patient verbalized understanding and agreed with this plan.    32 minutes was spent in the care of this patient including chart review, HPI, ROS, PE, review of plan, and placing of orders.      Labs:    No results found for any visits on 10/26/23.     Vital  signs reviewed by Mansoor Ames PA-C  BP (!) 149/79   Pulse 79   Temp 98.3  F (36.8  C) (Tympanic)   Wt 61.9 kg (136 lb 8 oz)   SpO2 96%   BMI 25.12 kg/m      Current Meds      Current Outpatient Medications:     atenolol (TENORMIN) 25 MG tablet, Take 0.5 tablets (12.5 mg) by mouth daily, Disp: 45 tablet, Rfl: 3    Calcium-Magnesium-Zinc 333-133-5 MG TABS, Take by mouth 2 times daily , Disp: , Rfl:     Cholecalciferol (VITAMIN D) 1000 UNITS capsule, Take 1 capsule by mouth daily., Disp: , Rfl:     Coenzyme Q10 (CO Q-10) 200 MG CAPS, Take 200 mg by mouth daily, Disp: 90 capsule, Rfl: 3    famotidine (PEPCID) 20 MG tablet, Take 1 tablet (20 mg) by mouth 2 times daily (with meals), Disp: 180 tablet, Rfl: 3    lisinopril (ZESTRIL) 5 MG tablet, Take 1 tablet (5 mg) by mouth daily, Disp: 90 tablet, Rfl: 3    omeprazole (PRILOSEC) 20 MG DR capsule, Take 1 capsule (20 mg) by mouth At Bedtime, Disp: 90 capsule, Rfl: 3    pravastatin (PRAVACHOL) 40 MG tablet, Take 1 tablet (40 mg) by mouth daily, Disp: 90 tablet, Rfl: 2    probiotic CAPS, 2 times weekly, Disp: , Rfl:     propafenone (RYTHMOL SR) 425 MG 12 hr capsule, Take 1 capsule (425 mg) by mouth 2 times daily, Disp: 180 capsule, Rfl: 3    warfarin ANTICOAGULANT (COUMADIN) 1 MG tablet, Take 1 mg every Mon, Wed, Fri; 2 mg all other days or As directed by Anticoagulation clinic, Disp: 145 tablet, Rfl: 1      Respiratory History    no history of pneumonia or bronchitis      SUBJECTIVE    HPI: Jenn Joseph is an 78 year old female PMHx of Atrial fibrillation on chronic anticoagulation with coumadin, CKD stage 3, and seasonal allergies who presents with cough dry, facial pain/pressure, headache, nasal congestion, nasal discharge clear and watery, and post nasal drip.  Symptoms began 5  days ago and has relapsing/remitting.  There is no shortness of breath, wheezing, chest pain, nausea, vomiting, and dysuria.  Patient is eating and drinking well.  No fever, nausea,  vomiting, or diarrhea. Patient has been managing headache/facial pain with Tylenol at home with relief. Most concerned about waxing and waning facial pressure/pain. No previous history of sinusitis.    Patient denies any recent travel or exposure to known COVID positive tested individual.      ROS:     Review of Systems   Constitutional:  Negative for chills and fever.   HENT:  Positive for postnasal drip, rhinorrhea, sinus pressure and sinus pain. Negative for congestion, ear pain, hearing loss and sore throat.    Eyes: Negative.    Respiratory:  Positive for cough. Negative for shortness of breath and wheezing.    Cardiovascular: Negative.  Negative for chest pain and palpitations.   Gastrointestinal:  Negative for diarrhea, nausea and vomiting.   Endocrine: Negative.    Genitourinary: Negative.    Musculoskeletal: Negative.  Negative for arthralgias, back pain, joint swelling, myalgias, neck pain and neck stiffness.   Skin: Negative.  Negative for rash and wound.   Allergic/Immunologic: Negative.  Negative for immunocompromised state.   Neurological:  Positive for headaches. Negative for dizziness, weakness and light-headedness.         Family History   Family History   Problem Relation Age of Onset    Hypertension Mother     Cerebrovascular Disease Mother     Thyroid Disease Mother     Cerebrovascular Disease Maternal Grandfather     Hypertension Brother     Musculoskeletal Disorder Brother         FIBROMYALGIA    Heart Disease Brother     Cardiovascular Brother         ATRIAL FIB    Musculoskeletal Disorder Sister         FIBROMYALGIA    Thyroid Disease Sister     Allergies Son     Heart Disease Brother     Cardiovascular Brother     Heart Disease Brother     Cardiovascular Brother     Heart Disease Brother     Cardiovascular Brother     Heart Disease Father     Cardiovascular Brother         ATRIAL FIB    Cardiovascular Brother         ATRIAL FIB    Cardiovascular Brother         ATRIAL FIB    Cardiovascular  Brother         ATRIAL FIB        Problem history  Patient Active Problem List   Diagnosis    GERD (gastroesophageal reflux disease)    CARDIOVASCULAR SCREENING; LDL GOAL LESS THAN 130    Hypertension, goal below 140/90    Long-term (current) use of anticoagulants, INR goal 2.0-3.0    CKD (chronic kidney disease) stage 3, GFR 30-59 ml/min (H)    Health Care Home    Seborrhea    Eyelid dermatitis, eczematous    Uterine prolapse    Paroxysmal atrial fibrillation (H)    Pure hypercholesterolemia    Atrial fibrillation, unspecified type (H)    Cystocele with uterine prolapse    Essential hypertension, benign    Hyperlipidemia LDL goal <100    Bilateral low back pain with left-sided sciatica    Sprain of lateral collateral ligament of left knee, initial encounter    Lateral meniscal pain, left         Allergies  Allergies   Allergen Reactions    Diflucan [Fluconazole] Rash     NOT EXACTLY SURE    Lamisil Rash    Terbinafine Rash        Social History  Social History     Socioeconomic History    Marital status:      Spouse name: Not on file    Number of children: 2    Years of education: Not on file    Highest education level: Not on file   Occupational History     Employer: RETIRED   Tobacco Use    Smoking status: Former     Passive exposure: Never    Smokeless tobacco: Never   Vaping Use    Vaping Use: Never used   Substance and Sexual Activity    Alcohol use: Yes     Comment: 1 - 2 drinks PER WEEK    Drug use: No    Sexual activity: Yes     Partners: Male     Birth control/protection: None   Other Topics Concern    Parent/sibling w/ CABG, MI or angioplasty before 65F 55M? Not Asked     Service No    Blood Transfusions No    Caffeine Concern No     Comment: 1/WK    Occupational Exposure No    Hobby Hazards Not Asked    Sleep Concern No     Comment: OCC PROBLEMS RELATED TO MENOPAUSE    Stress Concern Not Asked    Weight Concern Not Asked    Special Diet Not Asked    Back Care Not Asked    Exercise Yes      Comment: 5 - 6/WK    Bike Helmet No     Comment: NOT REGULARLY    Seat Belt Yes    Self-Exams Yes     Comment: OCC   Social History Narrative    Not on file     Social Determinants of Health     Financial Resource Strain: Not on file   Food Insecurity: Not on file   Transportation Needs: Not on file   Physical Activity: Not on file   Stress: Not on file   Social Connections: Not on file   Interpersonal Safety: Not on file   Housing Stability: Not on file        OBJECTIVE     Vital signs reviewed by Mansoor Ames PA-C  BP (!) 149/79   Pulse 79   Temp 98.3  F (36.8  C) (Tympanic)   Wt 61.9 kg (136 lb 8 oz)   SpO2 96%   BMI 25.12 kg/m       Physical Exam  Vitals and nursing note reviewed.   Constitutional:       General: She is not in acute distress.     Appearance: She is well-developed. She is not ill-appearing, toxic-appearing or diaphoretic.   HENT:      Head: Normocephalic and atraumatic.      Right Ear: Hearing, tympanic membrane, ear canal and external ear normal. Tympanic membrane is not perforated, erythematous, retracted or bulging.      Left Ear: Hearing, tympanic membrane, ear canal and external ear normal. Tympanic membrane is not perforated, erythematous, retracted or bulging.      Nose: Congestion and rhinorrhea present. No mucosal edema.      Right Turbinates: Enlarged.      Left Turbinates: Enlarged.      Right Sinus: Frontal sinus tenderness present. No maxillary sinus tenderness.      Left Sinus: No maxillary sinus tenderness or frontal sinus tenderness.      Mouth/Throat:      Mouth: Mucous membranes are moist.      Pharynx: No pharyngeal swelling, oropharyngeal exudate, posterior oropharyngeal erythema or uvula swelling.      Tonsils: No tonsillar exudate or tonsillar abscesses. 0 on the right. 0 on the left.   Eyes:      General:         Right eye: No discharge.         Left eye: No discharge.      Pupils: Pupils are equal, round, and reactive to light.   Cardiovascular:      Rate and  Rhythm: Normal rate and regular rhythm.      Heart sounds: Normal heart sounds. No murmur heard.     No friction rub. No gallop.   Pulmonary:      Effort: Pulmonary effort is normal. No respiratory distress.      Breath sounds: Normal breath sounds. No decreased breath sounds, wheezing, rhonchi or rales.   Chest:      Chest wall: No tenderness.   Abdominal:      General: Bowel sounds are normal. There is no distension.      Palpations: Abdomen is soft. There is no mass.      Tenderness: There is no abdominal tenderness. There is no guarding.   Musculoskeletal:      Cervical back: Normal range of motion and neck supple.   Lymphadenopathy:      Head:      Right side of head: No submental, submandibular, tonsillar, preauricular or posterior auricular adenopathy.      Left side of head: No submental, submandibular, tonsillar, preauricular or posterior auricular adenopathy.      Cervical: No cervical adenopathy.      Right cervical: No superficial or posterior cervical adenopathy.     Left cervical: No superficial or posterior cervical adenopathy.   Skin:     General: Skin is warm and dry.      Findings: No rash.   Neurological:      Mental Status: She is alert and oriented to person, place, and time.      Cranial Nerves: No cranial nerve deficit.      Deep Tendon Reflexes: Reflexes are normal and symmetric.   Psychiatric:         Behavior: Behavior normal. Behavior is cooperative.         Thought Content: Thought content normal.         Judgment: Judgment normal.           Mansoor Ames PA-C  10/26/2023, 10:11 AM

## 2023-11-03 DIAGNOSIS — Z79.01 LONG-TERM (CURRENT) USE OF ANTICOAGULANTS, INR GOAL 2.0-3.0: ICD-10-CM

## 2023-11-03 DIAGNOSIS — I48.0 PAROXYSMAL ATRIAL FIBRILLATION (H): ICD-10-CM

## 2023-11-03 RX ORDER — WARFARIN SODIUM 1 MG/1
TABLET ORAL
Qty: 145 TABLET | Refills: 1 | Status: SHIPPED | OUTPATIENT
Start: 2023-11-03 | End: 2024-05-01

## 2023-11-03 NOTE — TELEPHONE ENCOUNTER
ANTICOAGULATION MANAGEMENT:  Medication Refill    Anticoagulation Summary  As of 9/27/2023      Warfarin maintenance plan:  1 mg (1 mg x 1) every Mon, Wed, Fri; 2 mg (1 mg x 2) all other days   Next INR check:  12/6/2023   Target end date:  Indefinite    Indications    Atrial fibrillation  unspecified type (H) [I48.91]  Chronic atrial fibrillation (H) (Resolved) [I48.20]  Paroxysmal atrial fibrillation (H) [I48.0]                 Anticoagulation Care Providers       Provider Role Specialty Phone number    Drake Lucas MD Referring Internal Medicine [phone]            Refill Criteria    Visit with referring provider/group: Meets criteria: office visit within referring provider group in the last 1 year on 4/21/2023    [facility] referral signed last signed: 04/06/2023; within last year: Yes    Lab monitoring not exceeding 2 weeks overdue: Yes    Jenn meets all criteria for refill. Rx instructions and quantity supplied updated to match patient's current dosing plan. Warfarin 90 day supply with 1 refill granted per [facility] protocol     Shabnam Raygoza RN  Anticoagulation Clinic

## 2023-12-06 ENCOUNTER — LAB (OUTPATIENT)
Dept: LAB | Facility: CLINIC | Age: 78
End: 2023-12-06
Payer: COMMERCIAL

## 2023-12-06 ENCOUNTER — ANTICOAGULATION THERAPY VISIT (OUTPATIENT)
Dept: ANTICOAGULATION | Facility: CLINIC | Age: 78
End: 2023-12-06

## 2023-12-06 DIAGNOSIS — I48.0 PAROXYSMAL ATRIAL FIBRILLATION (H): ICD-10-CM

## 2023-12-06 DIAGNOSIS — I48.91 ATRIAL FIBRILLATION, UNSPECIFIED TYPE (H): Primary | ICD-10-CM

## 2023-12-06 LAB — INR BLD: 2.4 (ref 0.9–1.1)

## 2023-12-06 PROCEDURE — 85610 PROTHROMBIN TIME: CPT

## 2023-12-06 PROCEDURE — 36416 COLLJ CAPILLARY BLOOD SPEC: CPT

## 2023-12-06 NOTE — PROGRESS NOTES
ANTICOAGULATION MANAGEMENT     Jenn TRAMMELL Jake 78 year old female is on warfarin with therapeutic INR result. (Goal INR 2.0-3.0)    Recent labs: (last 7 days)     12/06/23  1016   INR 2.4*       ASSESSMENT     Source(s): Chart Review and Patient/Caregiver Call     Warfarin doses taken: Warfarin taken as instructed  Diet: No new diet changes identified  Medication/supplement changes: None noted  New illness, injury, or hospitalization: Yes: UC visit 10/26/23 for upper respiratory tract infection w/ cough  Signs or symptoms of bleeding or clotting: No  Previous result: Therapeutic last 2(+) visits  Additional findings: None     PLAN     Recommended plan for no diet, medication or health factor changes affecting INR     Dosing Instructions: Continue your current warfarin dose with next INR in 10 weeks       Summary  As of 12/6/2023      Full warfarin instructions:  1 mg every Mon, Wed, Fri; 2 mg all other days   Next INR check:  2/14/2024               Telephone call with Jenn who verbalizes understanding and agrees to plan    Lab visit scheduled    Education provided:   Please call back if any changes to your diet, medications or how you've been taking warfarin    Plan made per [facility] anticoagulation protocol    Miranda Abreu RN  Anticoagulation Clinic  12/6/2023    _______________________________________________________________________     Anticoagulation Episode Summary       Current INR goal:  2.0-3.0   TTR:  100.0% (1 y)   Target end date:  Indefinite   Send INR reminders to:  ELIZABETH CROUCH    Indications    Atrial fibrillation  unspecified type (H) [I48.91]  Chronic atrial fibrillation (H) (Resolved) [I48.20]  Paroxysmal atrial fibrillation (H) [I48.0]             Comments:  okay for 8-12 week checks per 2/10/23 message             Anticoagulation Care Providers       Provider Role Specialty Phone number    Drake Lucas MD Referring Internal Medicine [phone]

## 2024-02-14 ENCOUNTER — TELEPHONE (OUTPATIENT)
Dept: FAMILY MEDICINE | Facility: CLINIC | Age: 79
End: 2024-02-14

## 2024-02-14 ENCOUNTER — DOCUMENTATION ONLY (OUTPATIENT)
Dept: ANTICOAGULATION | Facility: CLINIC | Age: 79
End: 2024-02-14

## 2024-02-14 ENCOUNTER — LAB (OUTPATIENT)
Dept: LAB | Facility: CLINIC | Age: 79
End: 2024-02-14
Payer: COMMERCIAL

## 2024-02-14 ENCOUNTER — ANTICOAGULATION THERAPY VISIT (OUTPATIENT)
Dept: ANTICOAGULATION | Facility: CLINIC | Age: 79
End: 2024-02-14

## 2024-02-14 DIAGNOSIS — E78.5 HYPERLIPIDEMIA LDL GOAL <100: Primary | ICD-10-CM

## 2024-02-14 DIAGNOSIS — Z00.00 MEDICARE ANNUAL WELLNESS VISIT, SUBSEQUENT: ICD-10-CM

## 2024-02-14 DIAGNOSIS — I48.0 PAROXYSMAL ATRIAL FIBRILLATION (H): ICD-10-CM

## 2024-02-14 DIAGNOSIS — I48.91 ATRIAL FIBRILLATION, UNSPECIFIED TYPE (H): Primary | ICD-10-CM

## 2024-02-14 DIAGNOSIS — I10 HYPERTENSION GOAL BP (BLOOD PRESSURE) < 140/90: ICD-10-CM

## 2024-02-14 LAB — INR BLD: 2.6 (ref 0.9–1.1)

## 2024-02-14 PROCEDURE — 36416 COLLJ CAPILLARY BLOOD SPEC: CPT

## 2024-02-14 PROCEDURE — 85610 PROTHROMBIN TIME: CPT

## 2024-02-14 NOTE — TELEPHONE ENCOUNTER
Reason for Call:  Other labs    Detailed comments: pt has apt for awv 4/23 - pt wanting to come in 4/17 to get labs done prior to apt. Can lab orders be put in?     Phone Number Patient can be reached at: Home number on file [phone] (home)    Best Time: any    Can we leave a detailed message on this number? Not Applicable    Call taken on 2/14/2024 at 1:52 PM by Zayra Jiang

## 2024-02-14 NOTE — PROGRESS NOTES
ANTICOAGULATION MANAGEMENT     Jenn Joseph 78 year old female is on warfarin with therapeutic INR result. (Goal INR 2.0-3.0)    Recent labs: (last 7 days)     02/14/24  0959   INR 2.6*       ASSESSMENT     Source(s): Chart Review  Previous INR was Therapeutic last 2(+) visits  Medication, diet, health changes since last INR chart reviewed; none identified    I left a detailed voicemail with the orders reflected in flowsheet. I have also requested a call back if there have been any missed doses, concerns, illness, fever, or if there have been any changes in medications, activity level, or diet       PLAN     Recommended plan for no diet, medication or health factor changes affecting INR     Dosing Instructions: Continue your current warfarin dose with next INR in 10   weeks       Summary  As of 2/14/2024      Full warfarin instructions:  1 mg every Mon, Wed, Fri; 2 mg all other days   Next INR check:  4/24/2024               Detailed voice message left for Jenn with dosing instructions and follow up date.     Contact [phone]  to schedule and with any changes, questions or concerns.     Education provided:   Please call back if any changes to your diet, medications or how you've been taking warfarin    Plan made per [facility] anticoagulation protocol    Paola Moreno, RN  Anticoagulation Clinic  2/14/2024    _______________________________________________________________________     Anticoagulation Episode Summary       Current INR goal:  2.0-3.0   TTR:  100.0% (1 y)   Target end date:  Indefinite   Send INR reminders to:  ELIZABETH CROUCH    Indications    Atrial fibrillation  unspecified type (H) [I48.91]  Chronic atrial fibrillation (H) (Resolved) [I48.20]  Paroxysmal atrial fibrillation (H) [I48.0]             Comments:  okay for 8-12 week checks per 2/10/23 message             Anticoagulation Care Providers       Provider Role Specialty Phone number    Drake Lucas MD Referring  Internal Medicine [phone]

## 2024-02-14 NOTE — PROGRESS NOTES
ANTICOAGULATION CLINIC REFERRAL RENEWAL REQUEST       An annual renewal order is required for all patients referred to [facility].?  Please review and sign the pended referral order for Jenn Joseph.       ANTICOAGULATION SUMMARY      Warfarin indication(s)   Atrial Fibrillation    Mechanical heart valve present?  NO       Current goal range   INR: 2.0-3.0     Goal appropriate for indication? Goal INR 2-3, standard for indication(s) above     Time in Therapeutic Range (TTR)  (Goal > 60%) 100%       Office visit with referring provider's group within last year yes on 4/21/23       Paola Moreno RN  [facility]

## 2024-02-19 DIAGNOSIS — E78.2 MIXED HYPERLIPIDEMIA: ICD-10-CM

## 2024-02-20 RX ORDER — PRAVASTATIN SODIUM 40 MG
40 TABLET ORAL DAILY
Qty: 90 TABLET | Refills: 0 | Status: SHIPPED | OUTPATIENT
Start: 2024-02-20 | End: 2024-05-16

## 2024-04-11 DIAGNOSIS — I48.91 A-FIB (H): Primary | ICD-10-CM

## 2024-04-17 ENCOUNTER — ANTICOAGULATION THERAPY VISIT (OUTPATIENT)
Dept: ANTICOAGULATION | Facility: CLINIC | Age: 79
End: 2024-04-17

## 2024-04-17 ENCOUNTER — LAB (OUTPATIENT)
Dept: LAB | Facility: CLINIC | Age: 79
End: 2024-04-17
Payer: COMMERCIAL

## 2024-04-17 DIAGNOSIS — Z00.00 MEDICARE ANNUAL WELLNESS VISIT, SUBSEQUENT: ICD-10-CM

## 2024-04-17 DIAGNOSIS — I10 HYPERTENSION GOAL BP (BLOOD PRESSURE) < 140/90: ICD-10-CM

## 2024-04-17 DIAGNOSIS — I48.91 A-FIB (H): ICD-10-CM

## 2024-04-17 DIAGNOSIS — E78.5 HYPERLIPIDEMIA LDL GOAL <100: ICD-10-CM

## 2024-04-17 DIAGNOSIS — I48.0 PAROXYSMAL ATRIAL FIBRILLATION (H): ICD-10-CM

## 2024-04-17 DIAGNOSIS — I48.91 ATRIAL FIBRILLATION, UNSPECIFIED TYPE (H): Primary | ICD-10-CM

## 2024-04-17 LAB
ALBUMIN SERPL BCG-MCNC: 4.2 G/DL (ref 3.5–5.2)
ALP SERPL-CCNC: 78 U/L (ref 40–150)
ALT SERPL W P-5'-P-CCNC: 19 U/L (ref 0–50)
ANION GAP SERPL CALCULATED.3IONS-SCNC: 12 MMOL/L (ref 7–15)
AST SERPL W P-5'-P-CCNC: 37 U/L (ref 0–45)
BASOPHILS # BLD AUTO: 0 10E3/UL (ref 0–0.2)
BASOPHILS NFR BLD AUTO: 1 %
BILIRUB SERPL-MCNC: 0.5 MG/DL
BUN SERPL-MCNC: 16.9 MG/DL (ref 8–23)
CALCIUM SERPL-MCNC: 10.2 MG/DL (ref 8.8–10.2)
CHLORIDE SERPL-SCNC: 101 MMOL/L (ref 98–107)
CHOLEST SERPL-MCNC: 207 MG/DL
CREAT SERPL-MCNC: 1.08 MG/DL (ref 0.51–0.95)
DEPRECATED HCO3 PLAS-SCNC: 28 MMOL/L (ref 22–29)
EGFRCR SERPLBLD CKD-EPI 2021: 52 ML/MIN/1.73M2
EOSINOPHIL # BLD AUTO: 0.1 10E3/UL (ref 0–0.7)
EOSINOPHIL NFR BLD AUTO: 3 %
ERYTHROCYTE [DISTWIDTH] IN BLOOD BY AUTOMATED COUNT: 13.1 % (ref 10–15)
FASTING STATUS PATIENT QL REPORTED: YES
GLUCOSE SERPL-MCNC: 61 MG/DL (ref 70–99)
HCT VFR BLD AUTO: 47.4 % (ref 35–47)
HDLC SERPL-MCNC: 54 MG/DL
HGB BLD-MCNC: 15.1 G/DL (ref 11.7–15.7)
IMM GRANULOCYTES # BLD: 0 10E3/UL
IMM GRANULOCYTES NFR BLD: 0 %
INR BLD: 2.5 (ref 0.9–1.1)
LDLC SERPL CALC-MCNC: 117 MG/DL
LYMPHOCYTES # BLD AUTO: 1.6 10E3/UL (ref 0.8–5.3)
LYMPHOCYTES NFR BLD AUTO: 29 %
MCH RBC QN AUTO: 28.3 PG (ref 26.5–33)
MCHC RBC AUTO-ENTMCNC: 31.9 G/DL (ref 31.5–36.5)
MCV RBC AUTO: 89 FL (ref 78–100)
MONOCYTES # BLD AUTO: 0.5 10E3/UL (ref 0–1.3)
MONOCYTES NFR BLD AUTO: 9 %
NEUTROPHILS # BLD AUTO: 3.3 10E3/UL (ref 1.6–8.3)
NEUTROPHILS NFR BLD AUTO: 59 %
NONHDLC SERPL-MCNC: 153 MG/DL
PLATELET # BLD AUTO: 203 10E3/UL (ref 150–450)
POTASSIUM SERPL-SCNC: 4 MMOL/L (ref 3.4–5.3)
PROT SERPL-MCNC: 7.4 G/DL (ref 6.4–8.3)
RBC # BLD AUTO: 5.33 10E6/UL (ref 3.8–5.2)
SODIUM SERPL-SCNC: 141 MMOL/L (ref 135–145)
TRIGL SERPL-MCNC: 178 MG/DL
TSH SERPL DL<=0.005 MIU/L-ACNC: 2.01 UIU/ML (ref 0.3–4.2)
WBC # BLD AUTO: 5.6 10E3/UL (ref 4–11)

## 2024-04-17 PROCEDURE — 85610 PROTHROMBIN TIME: CPT

## 2024-04-17 PROCEDURE — 36415 COLL VENOUS BLD VENIPUNCTURE: CPT

## 2024-04-17 PROCEDURE — 85025 COMPLETE CBC W/AUTO DIFF WBC: CPT

## 2024-04-17 PROCEDURE — 80053 COMPREHEN METABOLIC PANEL: CPT

## 2024-04-17 PROCEDURE — 80061 LIPID PANEL: CPT

## 2024-04-17 PROCEDURE — 84443 ASSAY THYROID STIM HORMONE: CPT

## 2024-04-17 NOTE — PROGRESS NOTES
ANTICOAGULATION MANAGEMENT     Jenn Joseph 79 year old female is on warfarin with therapeutic INR result. (Goal INR 2.0-3.0)    Recent labs: (last 7 days)     04/17/24  1006   INR 2.5*       ASSESSMENT     Source(s): Chart Review and Patient/Caregiver Call     Warfarin doses taken: Warfarin taken as instructed  Diet: No new diet changes identified  Medication/supplement changes: None noted  New illness, injury, or hospitalization: No  Signs or symptoms of bleeding or clotting: No  Previous result: Therapeutic last 2(+) visits  Additional findings: None       PLAN     Recommended plan for no diet, medication or health factor changes affecting INR     Dosing Instructions: Continue your current warfarin dose with next INR in 10 weeks       Summary  As of 4/17/2024      Full warfarin instructions:  1 mg every Mon, Wed, Fri; 2 mg all other days   Next INR check:  6/26/2024               Telephone call with Jenn who verbalizes understanding and agrees to plan    Lab visit scheduled    Education provided:   Goal range and lab monitoring: goal range and significance of current result    Plan made per [facility] anticoagulation protocol    Marisela Josue RN  Anticoagulation Clinic  4/17/2024    _______________________________________________________________________     Anticoagulation Episode Summary       Current INR goal:  2.0-3.0   TTR:  100.0% (1 y)   Target end date:  Indefinite   Send INR reminders to:  ELIZABETH CROUCH    Indications    Atrial fibrillation  unspecified type (H) [I48.91]  Chronic atrial fibrillation (H) (Resolved) [I48.20]  Paroxysmal atrial fibrillation (H) [I48.0]             Comments:  okay for 8-12 week checks per 2/10/23 message             Anticoagulation Care Providers       Provider Role Specialty Phone number    VocalDrake MD Referring Internal Medicine [phone]

## 2024-04-19 DIAGNOSIS — I10 HYPERTENSION GOAL BP (BLOOD PRESSURE) < 140/90: ICD-10-CM

## 2024-04-19 RX ORDER — LISINOPRIL 5 MG/1
5 TABLET ORAL DAILY
Qty: 90 TABLET | Refills: 0 | Status: SHIPPED | OUTPATIENT
Start: 2024-04-19 | End: 2024-07-15

## 2024-04-23 ENCOUNTER — OFFICE VISIT (OUTPATIENT)
Dept: FAMILY MEDICINE | Facility: CLINIC | Age: 79
End: 2024-04-23
Payer: COMMERCIAL

## 2024-04-23 VITALS
RESPIRATION RATE: 18 BRPM | BODY MASS INDEX: 25.94 KG/M2 | OXYGEN SATURATION: 98 % | HEART RATE: 72 BPM | SYSTOLIC BLOOD PRESSURE: 135 MMHG | HEIGHT: 61 IN | WEIGHT: 137.4 LBS | DIASTOLIC BLOOD PRESSURE: 85 MMHG

## 2024-04-23 DIAGNOSIS — I10 HYPERTENSION GOAL BP (BLOOD PRESSURE) < 140/90: ICD-10-CM

## 2024-04-23 DIAGNOSIS — N18.31 CKD STAGE 3A, GFR 45-59 ML/MIN (H): ICD-10-CM

## 2024-04-23 DIAGNOSIS — I48.0 PAROXYSMAL ATRIAL FIBRILLATION (H): ICD-10-CM

## 2024-04-23 DIAGNOSIS — Z00.00 MEDICARE ANNUAL WELLNESS VISIT, SUBSEQUENT: Primary | ICD-10-CM

## 2024-04-23 PROCEDURE — G0439 PPPS, SUBSEQ VISIT: HCPCS | Performed by: INTERNAL MEDICINE

## 2024-04-23 RX ORDER — ATENOLOL 25 MG/1
12.5 TABLET ORAL DAILY
Qty: 45 TABLET | Refills: 3 | Status: SHIPPED | OUTPATIENT
Start: 2024-04-23

## 2024-04-23 RX ORDER — PROPAFENONE HYDROCHLORIDE 425 MG/1
425 CAPSULE, EXTENDED RELEASE ORAL 2 TIMES DAILY
Qty: 180 CAPSULE | Refills: 3 | Status: SHIPPED | OUTPATIENT
Start: 2024-04-23

## 2024-04-23 SDOH — HEALTH STABILITY: PHYSICAL HEALTH: ON AVERAGE, HOW MANY DAYS PER WEEK DO YOU ENGAGE IN MODERATE TO STRENUOUS EXERCISE (LIKE A BRISK WALK)?: 2 DAYS

## 2024-04-23 ASSESSMENT — PAIN SCALES - GENERAL: PAINLEVEL: NO PAIN (0)

## 2024-04-23 ASSESSMENT — SOCIAL DETERMINANTS OF HEALTH (SDOH): HOW OFTEN DO YOU GET TOGETHER WITH FRIENDS OR RELATIVES?: MORE THAN THREE TIMES A WEEK

## 2024-04-23 NOTE — PROGRESS NOTES
Preventive Care Visit  [facility]  Drake Lucas MD, Internal Medicine  Apr 23, 2024  {Provider  Link to SmartSet :048732}    {PROVIDER CHARTING PREFERENCE:998204}    Colt Barajas is a 79 year old, presenting for the following:  Physical        4/23/2024     3:19 PM   Additional Questions   Roomed by DM     {ROOMER if patient is in their first year of Medicare a vision screen is required click here to document the Vison screen and then refresh the note to pull in results  :312554}    Health Care Directive  Patient has a Health Care Directive on file  {(AWV REQUIRED) Advanced Care Planning Reviewed:323171}    HPI  ***  {MA/LPN/RN Pre-Provider Visit Orders- hCG/UA/Strep (Optional):826068}  {SUPERLIST (Optional):279934}  {additonal problems for provider to add (Optional):532063}      4/23/2024   General Health   How would you rate your overall physical health? Good   Feel stress (tense, anxious, or unable to sleep) Only a little   (!) STRESS CONCERN      4/23/2024   Nutrition   Diet: Regular (no restrictions)         4/23/2024   Exercise   Days per week of moderate/strenous exercise 2 days   (!) EXERCISE CONCERN      4/23/2024   Social Factors   Frequency of gathering with friends or relatives More than three times a week   Worry food won't last until get money to buy more No   Food not last or not have enough money for food? No   Do you have housing?  Yes   Are you worried about losing your housing? No   Lack of transportation? No   Unable to get utilities (heat,electricity)? No         4/23/2024   Fall Risk   Fallen 2 or more times in the past year? No   Trouble with walking or balance? Yes   Gait Speed Test (Document in seconds) 5   Gait Speed Test Interpretation Less than or equal to 5.00 seconds - PASS          4/23/2024   Activities of Daily Living- Home Safety   Needs help with the following daily activites None of the above   Safety concerns in the home None of the  above         4/23/2024   Dental   Dentist two times every year? Yes         4/23/2024   Hearing Screening   Hearing concerns? None of the above         4/23/2024   Driving Risk Screening   Patient/family members have concerns about driving No         4/23/2024   General Alertness/Fatigue Screening   Have you been more tired than usual lately? No         4/23/2024   Urinary Incontinence Screening   Bothered by leaking urine in past 6 months Yes         4/23/2024   TB Screening   Were you born outside of the [place]? No         Today's PHQ-2 Score:       4/23/2024     3:26 PM   PHQ-2 ( 1999 Pfizer)   Q1: Little interest or pleasure in doing things 0   Q2: Feeling down, depressed or hopeless 0   PHQ-2 Score 0   Q1: Little interest or pleasure in doing things Not at all   Q2: Feeling down, depressed or hopeless Not at all   PHQ-2 Score 0           4/23/2024   Substance Use   Alcohol more than 3/day or more than 7/wk No   Do you have a current opioid prescription? No   How severe/bad is pain from 1 to 10? 2/10   Do you use any other substances recreationally? No    (!) ALCOHOL     Social History     Tobacco Use    Smoking status: Former     Passive exposure: Never    Smokeless tobacco: Never   Vaping Use    Vaping status: Never Used   Substance Use Topics    Alcohol use: Yes     Comment: 1 - 2 drinks PER WEEK    Drug use: No     {Provider  If there are gaps in the social history shown above, please follow the link to update and then refresh the note Link to Social and Substance History :909352}     {Mammogram Decision Support (Optional):287891}    ASCVD Risk   The 10-year ASCVD risk score (Alyce FALCON, et al., 2019) is: 48.1%    Values used to calculate the score:      Age: 79 years      Sex: Female      Is Non- : No      Diabetic: No      Tobacco smoker: No      Systolic Blood Pressure: 170 mmHg      Is BP treated: Yes      HDL Cholesterol: 54 mg/dL      Total Cholesterol: 207 mg/dL    {Link  "to Fracture Risk Assessment Tool (Optional):028350}    {Provider  Use the storyboard to review patient history, after sections have been marked as reviewed, refresh note to capture documentation:088599}  {Provider   REQUIRED AWV use this link to review and update sexual activity history  after section has been marked as reviewed, refresh note to capture documentation:587044}  Reviewed and updated as needed this visit by Provider                    {HISTORY OPTIONS (Optional):994855}  Current providers sharing in care for this patient include:  Patient Care Team:  Drake Luacs MD as PCP - General (Internal Medicine)  Drake Lucas MD as Assigned PCP  Konstantin Costa DO as Assigned Musculoskeletal Provider    The following health maintenance items are reviewed in Epic and correct as of today:  Health Maintenance   Topic Date Due    LUNG CANCER SCREENING  01/07/2011    COVID-19 Vaccine (7 - 2023-24 season) 02/17/2024    MICROALBUMIN  04/06/2024    ANNUAL REVIEW OF HM ORDERS  04/21/2024    MEDICARE ANNUAL WELLNESS VISIT  04/21/2024    BMP  04/17/2025    LIPID  04/17/2025    HEMOGLOBIN  04/17/2025    FALL RISK ASSESSMENT  04/23/2025    DEXA  01/17/2027    GLUCOSE  04/17/2027    ADVANCE CARE PLANNING  05/15/2028    DTAP/TDAP/TD IMMUNIZATION (2 - Td or Tdap) 05/31/2033    HEPATITIS C SCREENING  Completed    PHQ-2 (once per calendar year)  Completed    INFLUENZA VACCINE  Completed    Pneumococcal Vaccine: 65+ Years  Completed    URINALYSIS  Completed    ZOSTER IMMUNIZATION  Completed    RSV VACCINE (Pregnancy & 60+)  Completed    IPV IMMUNIZATION  Aged Out    HPV IMMUNIZATION  Aged Out    MENINGITIS IMMUNIZATION  Aged Out    RSV MONOCLONAL ANTIBODY  Aged Out    MAMMO SCREENING  Discontinued    COLORECTAL CANCER SCREENING  Discontinued       {ROS Picklists (Optional):999720}     Objective    Exam  BP (!) 170/82   Pulse 72   Resp 18   Ht 1.549 m (5' 1\")   Wt 62.3 kg (137 lb 6.4 oz)   SpO2 98%   " "BMI 25.96 kg/m     Estimated body mass index is 25.96 kg/m  as calculated from the following:    Height as of this encounter: 1.549 m (5' 1\").    Weight as of this encounter: 62.3 kg (137 lb 6.4 oz).    Physical Exam  {Exam Choices (Optional):534621}        4/23/2024   Mini Cog   Clock Draw Score 2 Normal   3 Item Recall 3 objects recalled   Mini Cog Total Score 5     {A Mini-Cog total score of 0-2 suggests the possibility of dementia, score of 3-5 suggests no dementia:102481}         Signed Electronically by: Drake Lucas MD  {Email feedback regarding this note to [email or web address]   :447687}  "

## 2024-04-29 NOTE — PROGRESS NOTES
SUBJECTIVE:   Jenn is a 79 year old, presenting for the following:  Physical    Health Care Directive  Patient has a Health Care Directive on file  Advance care planning document is on file and is current.    Today's PHQ-2 Score:        4/23/2024     3:26 PM   PHQ-2 ( 1999 Pfizer)   Q1: Little interest or pleasure in doing things 0   Q2: Feeling down, depressed or hopeless 0   PHQ-2 Score 0   Q1: Little interest or pleasure in doing things Not at all   Q2: Feeling down, depressed or hopeless Not at all   PHQ-2 Score 0         4/23/2024   Mini Cog   Clock Draw Score 2 Normal   3 Item Recall 3 objects recalled   Mini Cog Total Score 5         4/23/2024   General Health   How would you rate your overall physical health? Good   Feel stress (tense, anxious, or unable to sleep) Only a little   (!) STRESS CONCERN      4/23/2024   Nutrition   Diet: Regular (no restrictions)         4/23/2024   Exercise   Days per week of moderate/strenous exercise 2 days   (!) EXERCISE CONCERN      4/23/2024   Social Factors   Frequency of gathering with friends or relatives More than three times a week   Worry food won't last until get money to buy more No   Food not last or not have enough money for food? No   Do you have housing?  Yes   Are you worried about losing your housing? No   Lack of transportation? No   Unable to get utilities (heat,electricity)? No         4/23/2024   Fall Risk   Fallen 2 or more times in the past year? No   Trouble with walking or balance? Yes   Gait Speed Test (Document in seconds) 5   Gait Speed Test Interpretation Less than or equal to 5.00 seconds - PASS          4/23/2024   Activities of Daily Living- Home Safety   Needs help with the following daily activites None of the above   Safety concerns in the home None of the above         4/23/2024   Dental   Dentist two times every year? Yes         4/23/2024   Hearing Screening   Hearing concerns? None of the above         4/23/2024   Driving Risk  Screening   Patient/family members have concerns about driving No         4/23/2024   General Alertness/Fatigue Screening   Have you been more tired than usual lately? No         4/23/2024   Urinary Incontinence Screening   Bothered by leaking urine in past 6 months Yes         4/23/2024   TB Screening   Were you born outside of the [place]? No           4/23/2024   Substance Use   Alcohol more than 3/day or more than 7/wk No   Do you have a current opioid prescription? No   How severe/bad is pain from 1 to 10? 2/10   Do you use any other substances recreationally? No    (!) ALCOHOL     Social History     Tobacco Use    Smoking status: Former     Passive exposure: Never    Smokeless tobacco: Never   Vaping Use    Vaping status: Never Used   Substance Use Topics    Alcohol use: Yes     Comment: 1 - 2 drinks PER WEEK    Drug use: No      Mammogram Screening - After age 74- determine frequency with patient based on health status, life expectancy and patient goals       Labs reviewed in EPIC  BP Readings from Last 3 Encounters:   04/23/24 135/85   10/26/23 (!) 149/79   04/21/23 139/80    Wt Readings from Last 3 Encounters:   04/23/24 62.3 kg (137 lb 6.4 oz)   10/26/23 61.9 kg (136 lb 8 oz)   04/21/23 61.2 kg (135 lb)               Patient Active Problem List   Diagnosis    GERD (gastroesophageal reflux disease)    CARDIOVASCULAR SCREENING; LDL GOAL LESS THAN 130    Hypertension, goal below 140/90    Long-term (current) use of anticoagulants, INR goal 2.0-3.0    CKD (chronic kidney disease) stage 3, GFR 30-59 ml/min (H)    Health Care Home    Seborrhea    Eyelid dermatitis, eczematous    Uterine prolapse    Paroxysmal atrial fibrillation (H)    Pure hypercholesterolemia    Atrial fibrillation, unspecified type (H)    Cystocele with uterine prolapse    Essential hypertension, benign    Hyperlipidemia LDL goal <100    Bilateral low back pain with left-sided sciatica    Sprain of lateral collateral ligament of left knee, initial  encounter    Lateral meniscal pain, left      Past Surgical History:   Procedure Laterality Date    D & C  80'S    HYSTERECTOMY  11-5-15    ROTATOR CUFF REPAIR RT/LT  2007    LEFT    TUBAL LIGATION  80'S       Social History     Tobacco Use    Smoking status: Former     Passive exposure: Never    Smokeless tobacco: Never   Substance Use Topics    Alcohol use: Yes     Comment: 1 - 2 drinks PER WEEK     Family History   Problem Relation Age of Onset    Hypertension Mother     Cerebrovascular Disease Mother     Thyroid Disease Mother     Cerebrovascular Disease Maternal Grandfather     Hypertension Brother     Musculoskeletal Disorder Brother         FIBROMYALGIA    Heart Disease Brother     Cardiovascular Brother         ATRIAL FIB    Musculoskeletal Disorder Sister         FIBROMYALGIA    Thyroid Disease Sister     Allergies Son     Heart Disease Brother     Cardiovascular Brother     Heart Disease Brother     Cardiovascular Brother     Heart Disease Brother     Cardiovascular Brother     Heart Disease Father     Cardiovascular Brother         ATRIAL FIB    Cardiovascular Brother         ATRIAL FIB    Cardiovascular Brother         ATRIAL FIB    Cardiovascular Brother         ATRIAL FIB         Allergies   Allergen Reactions    Diflucan [Fluconazole] Rash     NOT EXACTLY SURE    Lamisil Rash    Terbinafine Rash        Do you have a current opioid prescription? no  Do you use any other controlled substances or medications that are not prescribed by a provider? none   Current providers sharing in care for this patient include:  Patient Care Team:  Drake Lucas MD as PCP - General (Internal Medicine)  Drake Lucas MD as Assigned PCP  Konstantin Costa DO as Assigned Musculoskeletal Provider    The following health maintenance items are reviewed in Epic and correct as of today:  Health Maintenance   Topic Date Due    LUNG CANCER SCREENING  01/07/2011    COVID-19 Vaccine (7 - 2023-24 season)  "02/17/2024    MICROALBUMIN  04/06/2024    ANNUAL REVIEW OF HM ORDERS  04/21/2024    MEDICARE ANNUAL WELLNESS VISIT  04/21/2024    BMP  04/17/2025    LIPID  04/17/2025    HEMOGLOBIN  04/17/2025    FALL RISK ASSESSMENT  04/23/2025    DEXA  01/17/2027    GLUCOSE  04/17/2027    ADVANCE CARE PLANNING  05/15/2028    DTAP/TDAP/TD IMMUNIZATION (2 - Td or Tdap) 05/31/2033    HEPATITIS C SCREENING  Completed    PHQ-2 (once per calendar year)  Completed    INFLUENZA VACCINE  Completed    Pneumococcal Vaccine: 65+ Years  Completed    URINALYSIS  Completed    ZOSTER IMMUNIZATION  Completed    RSV VACCINE (Pregnancy & 60+)  Completed    IPV IMMUNIZATION  Aged Out    HPV IMMUNIZATION  Aged Out    MENINGITIS IMMUNIZATION  Aged Out    RSV MONOCLONAL ANTIBODY  Aged Out    MAMMO SCREENING  Discontinued    COLORECTAL CANCER SCREENING  Discontinued       \     Reviewed and updated as needed this visit by clinical staff   Tobacco  Allergies  Meds              Reviewed and updated as needed this visit by Provider                  Review of Systems  CONSTITUTIONAL: NEGATIVE for fever, chills, change in weight  INTEGUMENTARY/SKIN: NEGATIVE for worrisome rashes, moles or lesions  EYES: NEGATIVE for vision changes or irritation  ENT/MOUTH: NEGATIVE for ear, mouth and throat problems  RESP: NEGATIVE for significant cough or SOB  CV: NEGATIVE for chest pain, palpitations or peripheral edema  GI: NEGATIVE for nausea, abdominal pain, heartburn, or change in bowel habits  : NEGATIVE for frequency, dysuria, or hematuria  MUSCULOSKELETAL: NEGATIVE for significant arthralgias or myalgia  NEURO: NEGATIVE for weakness, dizziness or paresthesias  ENDOCRINE: NEGATIVE for temperature intolerance, skin/hair changes  HEME: NEGATIVE for bleeding problems  PSYCHIATRIC: NEGATIVE for changes in mood or affect    OBJECTIVE:   /85   Pulse 72   Resp 18   Ht 1.549 m (5' 1\")   Wt 62.3 kg (137 lb 6.4 oz)   SpO2 98%   BMI 25.96 kg/m     Estimated body " "mass index is 25.96 kg/m  as calculated from the following:    Height as of this encounter: 1.549 m (5' 1\").    Weight as of this encounter: 62.3 kg (137 lb 6.4 oz).  Physical Exam  GENERAL: alert and no distress  EYES: Eyes grossly normal to inspection and conjunctivae and sclerae normal  HENT: normal cephalic/atraumatic and oral mucous membranes moist  RESP: lungs clear to auscultation - no rales, rhonchi or wheezes  CV: regular rate and rhythm, normal S1 S2, no S3 or S4, no murmur, click or rub, no peripheral edema  ABDOMEN: soft, nontender and bowel sounds normal  MS: no gross musculoskeletal defects noted, no edema  NEURO: Normal strength and tone, mentation intact and speech normal  PSYCH: mentation appears normal, affect normal/bright    Diagnostic Test Results:  4/17/2024  3:22 PM     Component Value Flag Ref Range Units Status   Cholesterol 207  High  <200 mg/dL Final   Triglycerides 178  High  <150 mg/dL Final   Direct Measure HDL 54  >=50 mg/dL Final   LDL Cholesterol Calculated 117  High  <=100 mg/dL Final   Non HDL Cholesterol 153  High  <130 mg/dL Final   Patient Fasting > 8hrs? Yes    Final     4/17/2024  3:22 PM    Component Value Flag Ref Range Units Status   Sodium 141  135 - 145 mmol/L Final   Comment:   Reference intervals for this test were updated on 09/26/2023 to more accurately reflect our healthy population. There may be differences in the flagging of prior results with similar values performed with this method. Interpretation of those prior results can be made in the context of the updated reference intervals.   Potassium 4.0  3.4 - 5.3 mmol/L Final   Carbon Dioxide (CO2) 28  22 - 29 mmol/L Final   Anion Gap 12  7 - 15 mmol/L Final   Urea Nitrogen 16.9  8.0 - 23.0 mg/dL Final   Creatinine 1.08  High  0.51 - 0.95 mg/dL Final   GFR Estimate 52  Low  >60 mL/min/1.73m2 Final   Calcium 10.2  8.8 - 10.2 mg/dL Final   Chloride 101  98 - 107 mmol/L Final   Glucose 61  Low  70 - 99 mg/dL Final "   Alkaline Phosphatase 78  40 - 150 U/L Final   Comment:   Reference intervals for this test were updated on 11/14/2023 to more accurately reflect our healthy population. There may be differences in the flagging of prior results with similar values performed with this method. Interpretation of those prior results can be made in the context of the updated reference intervals.   AST 37  0 - 45 U/L Final   Comment:   Reference intervals for this test were updated on 6/12/2023 to more accurately reflect our healthy population. There may be differences in the flagging of prior results with similar values performed with this method. Interpretation of those prior results can be made in the context of the updated reference intervals.   ALT 19  0 - 50 U/L Final   Comment:   Reference intervals for this test were updated on 6/12/2023 to more accurately reflect our healthy population. There may be differences in the flagging of prior results with similar values performed with this method. Interpretation of those prior results can be made in the context of the updated reference intervals.     Protein Total 7.4  6.4 - 8.3 g/dL Final   Albumin 4.2  3.5 - 5.2 g/dL Final   Bilirubin Total 0.5  <=1.2 mg/dL Final       ASSESSMENT/PLAN:     Medicare annual wellness visit, subsequent  - REVIEW OF HEALTH MAINTENANCE PROTOCOL ORDERS    CKD stage 3a, GFR 45-59 ml/min (H)  - Albumin Random Urine Quantitative with Creat Ratio; Future  - REVIEW OF HEALTH MAINTENANCE PROTOCOL ORDERS    Hypertension goal BP (blood pressure) < 140/90  - REVIEW OF HEALTH MAINTENANCE PROTOCOL ORDERS  - atenolol (TENORMIN) 25 MG tablet; Take 0.5 tablets (12.5 mg) by mouth daily  - Albumin Random Urine Quantitative with Creat Ratio; Future  - REVIEW OF HEALTH MAINTENANCE PROTOCOL ORDERS    Paroxysmal atrial fibrillation (H)  - REVIEW OF HEALTH MAINTENANCE PROTOCOL ORDERS  - propafenone (RYTHMOL SR) 425 MG 12 hr capsule; Take 1 capsule (425 mg) by mouth 2 times  "daily  - atenolol (TENORMIN) 25 MG tablet; Take 0.5 tablets (12.5 mg) by mouth daily  - REVIEW OF HEALTH MAINTENANCE PROTOCOL ORDERS     Patient has been advised of split billing requirements and indicates understanding: Yes      Counseling  Special attention given to:        Regular exercise       Healthy diet/nutrition       The 10-year ASCVD risk score (Alyce FALCON, et al., 2019) is: 33.8%    Values used to calculate the score:      Age: 79 years      Sex: Female      Is Non- : No      Diabetic: No      Tobacco smoker: No      Systolic Blood Pressure: 135 mmHg      Is BP treated: Yes      HDL Cholesterol: 54 mg/dL      Total Cholesterol: 207 mg/dL      BMI  Estimated body mass index is 25.96 kg/m  as calculated from the following:    Height as of this encounter: 1.549 m (5' 1\").    Weight as of this encounter: 62.3 kg (137 lb 6.4 oz).   Weight management plan: diet and exercise.      She reports that she has quit smoking. She has never been exposed to tobacco smoke. She has never used smokeless tobacco.          Signed Electronically by: Drake Lucas MD  "

## 2024-04-30 DIAGNOSIS — I48.0 PAROXYSMAL ATRIAL FIBRILLATION (H): ICD-10-CM

## 2024-04-30 DIAGNOSIS — Z79.01 LONG-TERM (CURRENT) USE OF ANTICOAGULANTS, INR GOAL 2.0-3.0: ICD-10-CM

## 2024-05-01 RX ORDER — WARFARIN SODIUM 1 MG/1
TABLET ORAL
Qty: 145 TABLET | Refills: 1 | Status: SHIPPED | OUTPATIENT
Start: 2024-05-01

## 2024-05-01 NOTE — TELEPHONE ENCOUNTER
ANTICOAGULATION MANAGEMENT:  Medication Refill    Anticoagulation Summary  As of 4/17/2024      Warfarin maintenance plan:  1 mg (1 mg x 1) every Mon, Wed, Fri; 2 mg (1 mg x 2) all other days   Next INR check:  6/26/2024   Target end date:  Indefinite    Indications    Atrial fibrillation  unspecified type (H) [I48.91]  Chronic atrial fibrillation (H) (Resolved) [I48.20]  Paroxysmal atrial fibrillation (H) [I48.0]                 Anticoagulation Care Providers       Provider Role Specialty Phone number    Drake Lucas MD Referring Internal Medicine [phone]            Refill Criteria    Visit with referring provider/group: Meets criteria: office visit within referring provider group in the last 1 year on 4/23/24    [facility] referral last signed: 02/14/2024; within last year: Yes    Lab monitoring not exceeding 2 weeks overdue: No    Jenn meets all criteria for refill. Rx instructions and quantity supplied updated to match patient's current dosing plan. Warfarin 90 day supply with 1 refill granted per [facility] protocol     Miranda Abreu RN  Anticoagulation Clinic

## 2024-05-02 DIAGNOSIS — K21.9 GASTROESOPHAGEAL REFLUX DISEASE WITHOUT ESOPHAGITIS: ICD-10-CM

## 2024-05-03 RX ORDER — FAMOTIDINE 20 MG/1
20 TABLET, FILM COATED ORAL 2 TIMES DAILY WITH MEALS
Qty: 180 TABLET | Refills: 2 | Status: SHIPPED | OUTPATIENT
Start: 2024-05-03

## 2024-05-16 DIAGNOSIS — E78.2 MIXED HYPERLIPIDEMIA: ICD-10-CM

## 2024-05-16 RX ORDER — PRAVASTATIN SODIUM 40 MG
40 TABLET ORAL DAILY
Qty: 90 TABLET | Refills: 2 | Status: SHIPPED | OUTPATIENT
Start: 2024-05-16

## 2024-06-26 ENCOUNTER — ANTICOAGULATION THERAPY VISIT (OUTPATIENT)
Dept: ANTICOAGULATION | Facility: CLINIC | Age: 79
End: 2024-06-26

## 2024-06-26 ENCOUNTER — LAB (OUTPATIENT)
Dept: LAB | Facility: CLINIC | Age: 79
End: 2024-06-26
Payer: COMMERCIAL

## 2024-06-26 DIAGNOSIS — I48.91 ATRIAL FIBRILLATION, UNSPECIFIED TYPE (H): Primary | ICD-10-CM

## 2024-06-26 DIAGNOSIS — N18.31 CKD STAGE 3A, GFR 45-59 ML/MIN (H): ICD-10-CM

## 2024-06-26 DIAGNOSIS — I48.91 A-FIB (H): ICD-10-CM

## 2024-06-26 DIAGNOSIS — I10 HYPERTENSION GOAL BP (BLOOD PRESSURE) < 140/90: ICD-10-CM

## 2024-06-26 DIAGNOSIS — I48.0 PAROXYSMAL ATRIAL FIBRILLATION (H): ICD-10-CM

## 2024-06-26 LAB — INR BLD: 2.7 (ref 0.9–1.1)

## 2024-06-26 PROCEDURE — 36416 COLLJ CAPILLARY BLOOD SPEC: CPT

## 2024-06-26 PROCEDURE — 85610 PROTHROMBIN TIME: CPT

## 2024-07-01 LAB
CREAT UR-MCNC: 68.5 MG/DL
MICROALBUMIN UR-MCNC: <12 MG/L
MICROALBUMIN/CREAT UR: NORMAL MG/G{CREAT}

## 2024-07-01 PROCEDURE — 82043 UR ALBUMIN QUANTITATIVE: CPT

## 2024-07-01 PROCEDURE — 82570 ASSAY OF URINE CREATININE: CPT

## 2024-07-15 DIAGNOSIS — I10 HYPERTENSION GOAL BP (BLOOD PRESSURE) < 140/90: ICD-10-CM

## 2024-07-15 RX ORDER — LISINOPRIL 5 MG/1
5 TABLET ORAL DAILY
Qty: 90 TABLET | Refills: 1 | Status: SHIPPED | OUTPATIENT
Start: 2024-07-15

## 2024-09-04 ENCOUNTER — ANTICOAGULATION THERAPY VISIT (OUTPATIENT)
Dept: ANTICOAGULATION | Facility: CLINIC | Age: 79
End: 2024-09-04

## 2024-09-04 ENCOUNTER — LAB (OUTPATIENT)
Dept: LAB | Facility: CLINIC | Age: 79
End: 2024-09-04
Payer: COMMERCIAL

## 2024-09-04 DIAGNOSIS — I48.0 PAROXYSMAL ATRIAL FIBRILLATION (H): ICD-10-CM

## 2024-09-04 DIAGNOSIS — I48.91 ATRIAL FIBRILLATION, UNSPECIFIED TYPE (H): Primary | ICD-10-CM

## 2024-09-04 DIAGNOSIS — I48.91 A-FIB (H): ICD-10-CM

## 2024-09-04 LAB — INR BLD: 2.7 (ref 0.9–1.1)

## 2024-09-04 PROCEDURE — 85610 PROTHROMBIN TIME: CPT

## 2024-09-04 PROCEDURE — 36416 COLLJ CAPILLARY BLOOD SPEC: CPT

## 2024-09-04 NOTE — PROGRESS NOTES
ANTICOAGULATION MANAGEMENT     Jenn Joseph 79 year old female is on warfarin with therapeutic INR result. (Goal INR 2.0-3.0)    Recent labs: (last 7 days)     09/04/24  1013   INR 2.7*       ASSESSMENT     Source(s): Chart Review and Patient/Caregiver Call     Warfarin doses taken: Warfarin taken as instructed  Diet: No new diet changes identified  Medication/supplement changes: None noted  New illness, injury, or hospitalization: No  Signs or symptoms of bleeding or clotting: No  Previous result: Therapeutic last 2(+) visits  Additional findings: None       PLAN     Recommended plan for no diet, medication or health factor changes affecting INR     Dosing Instructions: Continue your current warfarin dose with next INR in 10 weeks       Summary  As of 9/4/2024      Full warfarin instructions:  1 mg every Mon, Wed, Fri; 2 mg all other days   Next INR check:  11/13/2024               Telephone call with Jenn who agrees to plan and repeated back plan correctly    Lab visit scheduled    Education provided: Please call back if any changes to your diet, medications or how you've been taking warfarin  Contact [phone] with any changes, questions or concerns.     Plan made per [facility] anticoagulation protocol    Lily Alexander RN  Anticoagulation Clinic  9/4/2024    _______________________________________________________________________     Anticoagulation Episode Summary       Current INR goal:  2.0-3.0   TTR:  100.0% (1 y)   Target end date:  Indefinite   Send INR reminders to:  ELIZABETH CROUCH    Indications    Atrial fibrillation  unspecified type (H) [I48.91]  Chronic atrial fibrillation (H) (Resolved) [I48.20]  Paroxysmal atrial fibrillation (H) [I48.0]             Comments:  okay for 8-12 week checks per 2/10/23 message             Anticoagulation Care Providers       Provider Role Specialty Phone number    Drake Lucas MD Referring Internal Medicine [phone]

## 2024-09-26 ENCOUNTER — TELEPHONE (OUTPATIENT)
Dept: FAMILY MEDICINE | Facility: CLINIC | Age: 79
End: 2024-09-26

## 2024-09-26 ENCOUNTER — LAB (OUTPATIENT)
Dept: LAB | Facility: CLINIC | Age: 79
End: 2024-09-26
Payer: COMMERCIAL

## 2024-09-26 ENCOUNTER — ANTICOAGULATION THERAPY VISIT (OUTPATIENT)
Dept: ANTICOAGULATION | Facility: CLINIC | Age: 79
End: 2024-09-26

## 2024-09-26 DIAGNOSIS — I48.0 PAROXYSMAL ATRIAL FIBRILLATION (H): ICD-10-CM

## 2024-09-26 DIAGNOSIS — I48.91 ATRIAL FIBRILLATION, UNSPECIFIED TYPE (H): Primary | ICD-10-CM

## 2024-09-26 DIAGNOSIS — I48.91 A-FIB (H): ICD-10-CM

## 2024-09-26 LAB — INR BLD: 1.7 (ref 0.9–1.1)

## 2024-09-26 PROCEDURE — 85610 PROTHROMBIN TIME: CPT

## 2024-09-26 PROCEDURE — 36416 COLLJ CAPILLARY BLOOD SPEC: CPT

## 2024-09-26 NOTE — PROGRESS NOTES
ANTICOAGULATION MANAGEMENT     Jenn TRAMMELL Jake 79 year old female is on warfarin with subtherapeutic INR result. (Goal INR 2.0-3.0)    Recent labs: (last 7 days)     09/26/24  1107   INR 1.7*       ASSESSMENT     Source(s): Chart Review  Previous INR was Therapeutic last 2(+) visits  Medication, diet, health changes since last INR see assessment below         PLAN     Recommended plan for ongoing change(s) affecting INR     Dosing Instructions: booster dose then continue your current warfarin dose with next INR in 1 week       Summary  As of 9/26/2024      Full warfarin instructions:  9/26: 3 mg; Otherwise 1 mg every Mon, Wed, Fri; 2 mg all other days   Next INR check:  10/2/2024               Telephone call with Jenn who verbalizes understanding and agrees to plan    Lab visit scheduled    Education provided: Goal range and lab monitoring: goal range and significance of current result    Plan made per [facility] anticoagulation protocol    Marisela Josue RN  9/26/2024  Anticoagulation Clinic  [employer] for routing messages: alee CROUCH  [facility] patient phone line: [phone]        _______________________________________________________________________     Anticoagulation Episode Summary       Current INR goal:  2.0-3.0   TTR:  98.2% (1 y)   Target end date:  Indefinite   Send INR reminders to:  ELIZABETH CROUCH    Indications    Atrial fibrillation  unspecified type (H) [I48.91]  Chronic atrial fibrillation (H) (Resolved) [I48.20]  Paroxysmal atrial fibrillation (H) [I48.0]             Comments:  okay for 8-12 week checks per 2/10/23 message             Anticoagulation Care Providers       Provider Role Specialty Phone number    Drake Lucas MD Referring Internal Medicine [phone]

## 2024-09-26 NOTE — TELEPHONE ENCOUNTER
See anticoag encounter for 9/26/24    Marisela Josue RN   [facility]

## 2024-09-26 NOTE — PROGRESS NOTES
ANTICOAGULATION MANAGEMENT     Jenn Joseph 79 year old female is on warfarin with subtherapeutic INR result. (Goal INR 2.0-3.0)    Recent labs: (last 7 days)     09/26/24  1107   INR 1.7*       ASSESSMENT     Source(s): Chart Review and Patient/Caregiver Call     Warfarin doses taken: Warfarin taken as instructed  Diet: No new diet changes identified  Medication/supplement changes:  patient stopped propafenone, started sotalol then stopped it due to side effects  and increased atenolol will be starting amiodarone on 9/28/24  New illness, injury, or hospitalization: No  Signs or symptoms of bleeding or clotting: No  Previous result: Therapeutic last 2(+) visits  Additional findings: None       PLAN     Unable to reach Jenn today.    After assessment complete patient asked to call back later for dosing changes and follow up appointment due to being in a restaurant and not being able to hear well.    Follow up required to discuss dosing instructions and confirm understanding of instructions    Marisela Josue RN  9/26/2024  Anticoagulation Clinic  [employer] for routing messages: alee CROUCH  [facility] patient phone line: [phone]

## 2024-09-26 NOTE — TELEPHONE ENCOUNTER
General Call    Contacts       Contact Date/Time Type Contact Phone/Fax    09/26/2024 10:02 AM CDT Phone (Incoming) Emiliana [phone]     OPT 4 and OPT 2 [facility]          Reason for Call:  Anitcoag    What are your questions or concerns:  [facility] is calling for the patient INR to be sen to them now weekly before and after ablation 11/4/24. FAX to [phone].     Date of last appointment with provider: 4.23.24    Could we send this information to you in [employer] [address] or would you prefer to receive a phone call?:   Patient would prefer a phone call   Okay to leave a detailed message?: Yes at Other phone number:  [phone] Emiliana

## 2024-09-26 NOTE — TELEPHONE ENCOUNTER
Patient Returning Call    Reason for call:  pt is returning call to [facility]     Information relayed to patient:      Patient has additional questions:  No      Could we send this information to you in [employer] or would you prefer to receive a phone call?:   Patient would prefer a phone call   Okay to leave a detailed message?: Yes at Cell number on file:    Telephone Information:   Mobile [phone]

## 2024-09-26 NOTE — TELEPHONE ENCOUNTER
Spoke to Edward at [facility], they are requesting that patient have weekly INR's prior to and for 4 weeks after ablation on 11/4/24 and fax results to them, updated anticoagulation calendar with information    Marisela Josue RN   [facility]

## 2024-10-02 ENCOUNTER — ANTICOAGULATION THERAPY VISIT (OUTPATIENT)
Dept: ANTICOAGULATION | Facility: CLINIC | Age: 79
End: 2024-10-02

## 2024-10-02 ENCOUNTER — LAB (OUTPATIENT)
Dept: LAB | Facility: CLINIC | Age: 79
End: 2024-10-02
Payer: COMMERCIAL

## 2024-10-02 DIAGNOSIS — I48.0 PAROXYSMAL ATRIAL FIBRILLATION (H): ICD-10-CM

## 2024-10-02 DIAGNOSIS — I48.91 ATRIAL FIBRILLATION, UNSPECIFIED TYPE (H): Primary | ICD-10-CM

## 2024-10-02 DIAGNOSIS — I48.91 A-FIB (H): ICD-10-CM

## 2024-10-02 LAB — INR BLD: 2.1 (ref 0.9–1.1)

## 2024-10-02 PROCEDURE — 36415 COLL VENOUS BLD VENIPUNCTURE: CPT

## 2024-10-02 PROCEDURE — 85610 PROTHROMBIN TIME: CPT

## 2024-10-02 NOTE — PROGRESS NOTES
ANTICOAGULATION MANAGEMENT     Jenn TRAMMELL Ajke 79 year old female is on warfarin with therapeutic INR result. (Goal INR 2.0-3.0)    Recent labs: (last 7 days)     10/02/24  1308   INR 2.1*       ASSESSMENT     Source(s): Chart Review and Patient/Caregiver Call     Warfarin doses taken: Booster dose(s) recently taken which may be affecting INR  Diet: Change in alcohol intake may be affecting INR. Ongoing change   Increase in vitamin K, ongoing change  Medication/supplement changes: None noted  New illness, injury, or hospitalization: No  Signs or symptoms of bleeding or clotting: No  Previous result: Subtherapeutic  Additional findings: Upcoming surgery/procedure 11/4/24 ablation, weekly INRs prior to and after procedure     PLAN     Recommended plan for ongoing change(s) affecting INR     Dosing Instructions: Increase your warfarin dose (9.1% change) with next INR in 1 week       Summary  As of 10/2/2024      Full warfarin instructions:  1 mg every Mon, Fri; 2 mg all other days   Next INR check:  10/9/2024               Telephone call with Jenn who verbalizes understanding and agrees to plan    Lab visit scheduled    Education provided: Please call back if any changes to your diet, medications or how you've been taking warfarin    Plan made with [facility] Pharmacist Keila Abreu RN  10/2/2024  Anticoagulation Clinic  [facility] for routing messages: alee CROUCH  [facility] patient phone line: [phone]        _______________________________________________________________________     Anticoagulation Episode Summary       Current INR goal:  2.0-3.0   TTR:  96.9% (1 y)   Target end date:  Indefinite   Send INR reminders to:  ELIZABETH CROUCH    Indications    Atrial fibrillation  unspecified type (H) [I48.91]  Chronic atrial fibrillation (H) (Resolved) [I48.20]  Paroxysmal atrial fibrillation (H) [I48.0]             Comments:  okay for 8-12 week checks per 2/10/23 message              Anticoagulation Care Providers       Provider Role Specialty Phone number    Drake Lucas MD Referring Internal Medicine [phone]

## 2024-10-09 ENCOUNTER — ANTICOAGULATION THERAPY VISIT (OUTPATIENT)
Dept: ANTICOAGULATION | Facility: CLINIC | Age: 79
End: 2024-10-09

## 2024-10-09 ENCOUNTER — LAB (OUTPATIENT)
Dept: LAB | Facility: CLINIC | Age: 79
End: 2024-10-09
Payer: COMMERCIAL

## 2024-10-09 DIAGNOSIS — I48.0 PAROXYSMAL ATRIAL FIBRILLATION (H): ICD-10-CM

## 2024-10-09 DIAGNOSIS — I48.91 A-FIB (H): ICD-10-CM

## 2024-10-09 DIAGNOSIS — I48.91 ATRIAL FIBRILLATION, UNSPECIFIED TYPE (H): Primary | ICD-10-CM

## 2024-10-09 LAB — INR BLD: 3.2 (ref 0.9–1.1)

## 2024-10-09 PROCEDURE — 85610 PROTHROMBIN TIME: CPT

## 2024-10-09 PROCEDURE — 36416 COLLJ CAPILLARY BLOOD SPEC: CPT

## 2024-10-09 NOTE — PROGRESS NOTES
ANTICOAGULATION MANAGEMENT     Jenn JP Joseph 79 year old female is on warfarin with supratherapeutic INR result. (Goal INR 2.0-3.0)    Recent labs: (last 7 days)     10/09/24  1257   INR 3.2*       ASSESSMENT     Source(s): Chart Review and Patient/Caregiver Call     Warfarin doses taken: Warfarin taken as instructed  Diet: Decreased greens/vitamin K in diet; plans to resume previous intake  Patient did have a glass of wine 3-4 days ago   Medication/supplement changes: None noted  New illness, injury, or hospitalization: No  Signs or symptoms of bleeding or clotting: No  Previous result: Therapeutic last visit; previously outside of goal range  Additional findings: Upcoming surgery/procedure 11/4/24 ablation, weekly INRs prior to and after procedure      PLAN     Recommended plan for temporary change(s) affecting INR     Dosing Instructions: partial hold then continue your current warfarin dose with next INR in 1 week       Increase greens (vitamin K) to 1-2 more servings per week     Summary  As of 10/9/2024      Full warfarin instructions:  10/9: 1 mg; Otherwise 1 mg every Mon, Fri; 2 mg all other days   Next INR check:  10/16/2024               Telephone call with Jenn who verbalizes understanding and agrees to plan. Faxed results for last week and today to [facility]     Lab visit scheduled    Education provided: Please call back if any changes to your diet, medications or how you've been taking warfarin  Symptom monitoring: monitoring for bleeding signs and symptoms and when to seek medical attention/emergency care    Plan made per [facility] anticoagulation protocol    Miranda Abreu RN  10/9/2024  Anticoagulation Clinic  [facility] for routing messages: alee CROUCH  [facility] patient phone line: [phone]        _______________________________________________________________________     Anticoagulation Episode Summary       Current INR goal:  2.0-3.0   TTR:  96.6% (1 y)    Target end date:  Indefinite   Send INR reminders to:  ELIZABETH CROUCH    Indications    Atrial fibrillation  unspecified type (H) [I48.91]  Chronic atrial fibrillation (H) (Resolved) [I48.20]  Paroxysmal atrial fibrillation (H) [I48.0]             Comments:  okay for 8-12 week checks per 2/10/23 message             Anticoagulation Care Providers       Provider Role Specialty Phone number    Drake Lucas MD Referring Internal Medicine [phone]

## 2024-10-09 NOTE — PATIENT INSTRUCTIONS
Plan: increase green intake (vitamin K) and avoid alcohol consumption    Some signs and symptoms of bleeding include: Nose bleed or cut that does not stop bleeding in 10 minutes, bleeding of the gums, vomiting (will look like coffee grounds) or coughing up blood, unusual, easy or large areas of bruising, increased or unexpected vaginal bleeding or increased menstrual flow, red or black stools, red or orange urine, prolonged or severe headache, pale skin, unusual or constant tiredness.  If you have these please call 911 or seek medical care immediately.

## 2024-10-16 ENCOUNTER — ANTICOAGULATION THERAPY VISIT (OUTPATIENT)
Dept: ANTICOAGULATION | Facility: CLINIC | Age: 79
End: 2024-10-16

## 2024-10-16 ENCOUNTER — LAB (OUTPATIENT)
Dept: LAB | Facility: CLINIC | Age: 79
End: 2024-10-16
Payer: COMMERCIAL

## 2024-10-16 DIAGNOSIS — I48.91 A-FIB (H): ICD-10-CM

## 2024-10-16 DIAGNOSIS — I48.0 PAROXYSMAL ATRIAL FIBRILLATION (H): ICD-10-CM

## 2024-10-16 DIAGNOSIS — I48.91 ATRIAL FIBRILLATION, UNSPECIFIED TYPE (H): Primary | ICD-10-CM

## 2024-10-16 LAB — INR BLD: 4.2 (ref 0.9–1.1)

## 2024-10-16 PROCEDURE — 36416 COLLJ CAPILLARY BLOOD SPEC: CPT

## 2024-10-16 PROCEDURE — 85610 PROTHROMBIN TIME: CPT

## 2024-10-16 NOTE — PROGRESS NOTES
ANTICOAGULATION MANAGEMENT     Jenn Joseph 79 year old female is on warfarin with supratherapeutic INR result. (Goal INR 2.0-3.0)    Recent labs: (last 7 days)     10/16/24  1256   INR 4.2*       ASSESSMENT     Source(s): Chart Review and Patient/Caregiver Call     Warfarin doses taken: Warfarin taken as instructed and partial hold last Wed  Diet: No new diet changes identified  Medication/supplement changes: None noted  New illness, injury, or hospitalization: No  Signs or symptoms of bleeding or clotting: No  Previous result: Supratherapeutic  Additional findings: Upcoming surgery/procedure ablation at [facility] 11/4/24     PLAN     Recommended plan for temporary change(s) affecting INR     Dosing Instructions: partial hold then decrease your warfarin dose (12.5% change) with next INR in 1 week       Discussed cutting tablets in detail with patient.    Fax results to [facility].     Summary  As of 10/16/2024      Full warfarin instructions:  10/16: 0.5 mg; Otherwise 1.5 mg every day   Next INR check:  10/23/2024               Telephone call with Jenn who verbalizes understanding and agrees to plan    Lab visit scheduled    Education provided: Please call back if any changes to your diet, medications or how you've been taking warfarin  Symptom monitoring: monitoring for bleeding signs and symptoms and when to seek medical attention/emergency care    Plan made with [facility] Pharmacist Keila Abreu RN  10/16/2024  Anticoagulation Clinic  [facility] for routing messages: alee CROUCH  [facility] patient phone line: [phone]        _______________________________________________________________________     Anticoagulation Episode Summary       Current INR goal:  2.0-3.0   TTR:  94.7% (1 y)   Target end date:  Indefinite   Send INR reminders to:  ELIZABETH CROUCH    Indications    Atrial fibrillation  unspecified type (H) [I48.91]  Chronic  atrial fibrillation (H) (Resolved) [I48.20]  Paroxysmal atrial fibrillation (H) [I48.0]             Comments:  okay for 8-12 week checks per 2/10/23 message             Anticoagulation Care Providers       Provider Role Specialty Phone number    Drake Lucas MD Referring Internal Medicine [phone]

## 2024-10-22 ENCOUNTER — OFFICE VISIT (OUTPATIENT)
Dept: FAMILY MEDICINE | Facility: CLINIC | Age: 79
End: 2024-10-22
Payer: COMMERCIAL

## 2024-10-22 ENCOUNTER — ANTICOAGULATION THERAPY VISIT (OUTPATIENT)
Dept: ANTICOAGULATION | Facility: CLINIC | Age: 79
End: 2024-10-22

## 2024-10-22 DIAGNOSIS — Z01.818 PREOP GENERAL PHYSICAL EXAM: Primary | ICD-10-CM

## 2024-10-22 DIAGNOSIS — I48.0 PAROXYSMAL ATRIAL FIBRILLATION (H): ICD-10-CM

## 2024-10-22 DIAGNOSIS — I48.91 ATRIAL FIBRILLATION, UNSPECIFIED TYPE (H): Primary | ICD-10-CM

## 2024-10-22 LAB
BASOPHILS # BLD AUTO: 0 10E3/UL (ref 0–0.2)
BASOPHILS NFR BLD AUTO: 1 %
EOSINOPHIL # BLD AUTO: 0.1 10E3/UL (ref 0–0.7)
EOSINOPHIL NFR BLD AUTO: 1 %
ERYTHROCYTE [DISTWIDTH] IN BLOOD BY AUTOMATED COUNT: 12.9 % (ref 10–15)
HCT VFR BLD AUTO: 46.3 % (ref 35–47)
HGB BLD-MCNC: 14.8 G/DL (ref 11.7–15.7)
IMM GRANULOCYTES # BLD: 0 10E3/UL
IMM GRANULOCYTES NFR BLD: 0 %
INR BLD: 3.5 (ref 0.9–1.1)
LYMPHOCYTES # BLD AUTO: 2 10E3/UL (ref 0.8–5.3)
LYMPHOCYTES NFR BLD AUTO: 28 %
MCH RBC QN AUTO: 28.9 PG (ref 26.5–33)
MCHC RBC AUTO-ENTMCNC: 32 G/DL (ref 31.5–36.5)
MCV RBC AUTO: 90 FL (ref 78–100)
MONOCYTES # BLD AUTO: 0.6 10E3/UL (ref 0–1.3)
MONOCYTES NFR BLD AUTO: 9 %
NEUTROPHILS # BLD AUTO: 4.5 10E3/UL (ref 1.6–8.3)
NEUTROPHILS NFR BLD AUTO: 62 %
PLATELET # BLD AUTO: 192 10E3/UL (ref 150–450)
RBC # BLD AUTO: 5.12 10E6/UL (ref 3.8–5.2)
WBC # BLD AUTO: 7.4 10E3/UL (ref 4–11)

## 2024-10-22 PROCEDURE — 36415 COLL VENOUS BLD VENIPUNCTURE: CPT | Performed by: INTERNAL MEDICINE

## 2024-10-22 PROCEDURE — 85025 COMPLETE CBC W/AUTO DIFF WBC: CPT | Performed by: INTERNAL MEDICINE

## 2024-10-22 PROCEDURE — 80053 COMPREHEN METABOLIC PANEL: CPT | Performed by: INTERNAL MEDICINE

## 2024-10-22 PROCEDURE — 99214 OFFICE O/P EST MOD 30 MIN: CPT | Performed by: INTERNAL MEDICINE

## 2024-10-22 PROCEDURE — 85610 PROTHROMBIN TIME: CPT | Performed by: INTERNAL MEDICINE

## 2024-10-22 RX ORDER — AMIODARONE HYDROCHLORIDE 200 MG/1
200 TABLET ORAL DAILY
COMMUNITY
Start: 2024-09-28

## 2024-10-22 ASSESSMENT — PAIN SCALES - GENERAL: PAINLEVEL: NO PAIN (0)

## 2024-10-22 NOTE — PATIENT INSTRUCTIONS
At [facility], we strive to deliver an exceptional experience to you, every time we see you. If you receive a survey, please let us know what we are doing well and/or what we could improve upon, as we do value your feedback.  If you have MyChart, you can expect to receive results automatically within 24 hours of their completion.  Your provider will send a note interpreting your results as well.   If you do not have MyChart, you should receive your results in about a week by mail.    Your care team:                            Family Medicine Internal Medicine   MD Drake Denise, MD Kizzy Jefferson, MD Pepito Green, MD Chelly Espinoza, PAJuliaC    Reji Musa, MD Pediatrics   Kathya Carrera, MD Rebecca Bernstein, MD Mirtha Barney, APRN CNP Marcela Langston APRN CNP   MD Anupama Bales, MD Nikkie Crews, CNP     Gautam Davis, CNP Same-Day Provider (No follow-up visits)   CHOLO Burns, DNP Avelina Madrigal, CHOLO Chacon, FNP, BC DAMARIS JohnsonC     Clinic hours: Monday - Thursday 7 am-6 pm; Fridays 7 am-5 pm.   Urgent care: Monday - Friday 10 am- 8 pm; Saturday and Sunday 9 am-5 pm.    Clinic: [phone]       [facility]: Monday - Thursday 8 am - 7 pm; Friday 8 am - 6 pm  [facility]: [phone]

## 2024-10-22 NOTE — PROGRESS NOTES
Preoperative Evaluation  [address] [address]  Phone: [phone]  Primary Provider: Drake Lucas MD  Pre-op Performing Provider: Drake Lucas MD  Oct 22, 2024         10/22/2024   Surgical Information   What procedure is being done? EP Ablation   Facility or Hospital where procedure/surgery will be performed: [facility].    Who is doing the procedure / surgery? Uche Haywood MD   Date of surgery / procedure: November 4, 2024   Time of surgery / procedure: 6:30 AM   Where do you plan to recover after surgery? Home   Fax number for surgical facility: Fax: [phone]   Available in EPIC    HPI related to upcoming procedure:            This is a 79 year old female who comes in today for a preoperative evaluation. The patient has atrial fibrillation that is not responsive to pharmacologic treatment. She is scheduled to undergo ablation on November 4, 2024.        10/22/2024   Pre-Op Questionnaire   Have you ever had a heart attack or stroke? No   Have you ever had surgery on your heart or blood vessels, such as a stent placement, a coronary artery bypass, or surgery on an artery in your head, neck, heart, or legs? No   Do you have chest pain with activity? No   Do you have a history of heart failure? No   Do you currently have a cold, bronchitis or symptoms of other infection? No   Do you have a cough, shortness of breath, or wheezing? (!) YES    Do you or anyone in your family have previous history of blood clots? (!) UNKNOWN    Do you or does anyone in your family have a serious bleeding problem such as prolonged bleeding following surgeries or cuts? No   Have you ever had problems with anemia or been told to take iron pills? No   Have you had any abnormal blood loss such as black, tarry or bloody stools, or abnormal vaginal bleeding? No   Have you ever had a blood transfusion? No   Are you  willing to have a blood transfusion if it is medically needed before, during, or after your surgery? Yes   Have you or any of your relatives ever had problems with anesthesia? (!) UNKNOWN    Do you have sleep apnea, excessive snoring or daytime drowsiness? No   Do you have any artifical heart valves or other implanted medical devices like a pacemaker, defibrillator, or continuous glucose monitor? No   Do you have artificial joints? No   Are you allergic to latex? No        Health Care Directive  Patient has a Health Care Directive on file    Preoperative Review of    reviewed - no record of controlled substances prescribed.      Patient Active Problem List    Diagnosis Date Noted    Sprain of lateral collateral ligament of left knee, initial encounter 01/11/2023     Priority: Medium    Lateral meniscal pain, left  01/11/2023     Priority: Medium    Bilateral low back pain with left-sided sciatica 01/03/2022     Priority: Medium    Atrial fibrillation, unspecified type (H) 03/24/2020     Priority: Medium    Pure hypercholesterolemia 10/17/2017     Priority: Medium    Cystocele with uterine prolapse 11/05/2015     Priority: Medium    Uterine prolapse 10/26/2015     Priority: Medium    Paroxysmal atrial fibrillation (H) 10/26/2015     Priority: Medium    Seborrhea 04/10/2013     Priority: Medium    Eyelid dermatitis, eczematous 04/10/2013     Priority: Medium    CKD (chronic kidney disease) stage 3, GFR 30-59 ml/min (H) 10/11/2012     Priority: Medium    Essential hypertension, benign 02/08/2012     Priority: Medium    Hyperlipidemia LDL goal <100 09/07/2011     Priority: Medium     Formatting of this note might be different from the original.  See results review for lipid profile.      Long-term (current) use of anticoagulants, INR goal 2.0-3.0 05/10/2011     Priority: Medium    Hypertension, goal below 140/90 01/19/2011     Priority: Medium    CARDIOVASCULAR SCREENING; LDL GOAL LESS THAN 130 10/31/2010      Priority: Medium    GERD (gastroesophageal reflux disease) 11/13/2009     Priority: Medium      Past Medical History:   Diagnosis Date    Atrial fibrillations     CKD (chronic kidney disease) stage 3, GFR 30-59 ml/min (H) 10/11/2012    Eczema     Fx lunate, wrist-closed 1/09    RIGHT    GERD (gastroesophageal reflux disease)     HTN (hypertension)     Long-term (current) use of anticoagulants, INR goal 2.0-3.0 5/10/2011    Pulmonary nodule 6/09    RECHECK Q 6 MO     Past Surgical History:   Procedure Laterality Date    D & C  80'S    HYSTERECTOMY  11-5-15    ROTATOR CUFF REPAIR RT/LT  2007    LEFT    TUBAL LIGATION  80'S     Current Outpatient Medications   Medication Sig Dispense Refill    amiodarone (PACERONE) 200 MG tablet Take 200 mg by mouth daily. 1 tablet daily      atenolol (TENORMIN) 25 MG tablet Take 0.5 tablets (12.5 mg) by mouth daily 45 tablet 3    Calcium-Magnesium-Zinc 333-133-5 MG TABS Take by mouth 2 times daily       Cholecalciferol (VITAMIN D) 1000 UNITS capsule Take 1 capsule by mouth daily.      Coenzyme Q10 (CO Q-10) 200 MG CAPS Take 200 mg by mouth daily 90 capsule 3    famotidine (PEPCID) 20 MG tablet Take 1 tablet (20 mg) by mouth 2 times daily (with meals) 180 tablet 2    lisinopril (ZESTRIL) 5 MG tablet Take 1 tablet (5 mg) by mouth daily 90 tablet 1    omeprazole (PRILOSEC) 20 MG DR capsule Take 1 capsule (20 mg) by mouth At Bedtime 90 capsule 3    pravastatin (PRAVACHOL) 40 MG tablet Take 1 tablet (40 mg) by mouth daily 90 tablet 2    probiotic CAPS Take by mouth. 1 tablet daily  Culturelle      vitamin B-12 (CYANOCOBALAMIN) 100 MCG tablet Take 500 mcg by mouth daily.      warfarin ANTICOAGULANT (COUMADIN) 1 MG tablet Take 1 mg every Mon, Wed, Fri; 2 mg all other days or As directed by Anticoagulation clinic (Patient taking differently: 1.5 mg daily -changed as of 10/16/2024) 145 tablet 1    propafenone (RYTHMOL SR) 425 MG 12 hr capsule Take 1 capsule (425 mg) by mouth 2 times daily  "(Patient not taking: Reported on 10/22/2024) 180 capsule 3       Allergies   Allergen Reactions    Diflucan [Fluconazole] Rash     NOT EXACTLY SURE    Lamisil Rash    Terbinafine Rash        Social History     Tobacco Use    Smoking status: Former     Passive exposure: Never    Smokeless tobacco: Never   Substance Use Topics    Alcohol use: Yes     Comment: 1 - 2 drinks PER WEEK       Review of Systems  CONSTITUTIONAL: NEGATIVE for fever, chills, change in weight  INTEGUMENTARY/SKIN: NEGATIVE for worrisome rashes, moles or lesions  EYES: NEGATIVE for vision changes or irritation  ENT/MOUTH: NEGATIVE for ear, mouth and throat problems  RESP: NEGATIVE for significant cough or SOB  BREAST: NEGATIVE for masses, tenderness or discharge  CV: NEGATIVE for orthopnea and paroxysmal nocturnal dyspnea  GI: NEGATIVE for nausea, abdominal pain, heartburn, or change in bowel habits  : NEGATIVE for frequency, dysuria, or hematuria  MUSCULOSKELETAL: NEGATIVE for significant arthralgias or myalgia  NEURO: NEGATIVE for weakness, dizziness or paresthesias  ENDOCRINE: NEGATIVE for temperature intolerance, skin/hair changes  HEME: NEGATIVE for bleeding problems  PSYCHIATRIC: NEGATIVE for changes in mood or affect    Objective    BP (!) 173/79 (BP Location: Left arm, Patient Position: Sitting, Cuff Size: Adult Regular)   Pulse 72   Temp 98.1  F (36.7  C) (Temporal)   Resp 20   Ht 1.556 m (5' 1.25\")   Wt 61.8 kg (136 lb 3.2 oz)   SpO2 98%   BMI 25.53 kg/m     Estimated body mass index is 25.53 kg/m  as calculated from the following:    Height as of this encounter: 1.556 m (5' 1.25\").    Weight as of this encounter: 61.8 kg (136 lb 3.2 oz).  Physical Exam  GENERAL: alert and no distress  EYES: Eyes grossly normal to inspection and conjunctivae and sclerae normal  HENT: normal cephalic/atraumatic and oral mucous membranes moist  RESP: lungs clear to auscultation - no rales, rhonchi or wheezes  CV: irregularly irregular rhythm, " peripheral pulses strong, and no peripheral edema  MS: no gross musculoskeletal defects noted, no edema  NEURO: Normal strength and tone, mentation intact and speech normal  PSYCH: mentation appears normal, affect normal/bright    Diagnostics  10/22/2024    Component Value Flag Ref Range Units Status   INR 3.5  High  0.9 - 1.1  Final   10/23/2024  3:35 AM     Component Value Flag Ref Range Units Status   Sodium 142  135 - 145 mmol/L Final   Potassium 4.7  3.4 - 5.3 mmol/L Final   Carbon Dioxide (CO2) 26  22 - 29 mmol/L Final   Anion Gap 12  7 - 15 mmol/L Final   Urea Nitrogen 16.1  8.0 - 23.0 mg/dL Final   Creatinine 1.05  High  0.51 - 0.95 mg/dL Final   GFR Estimate 54  Low  >60 mL/min/1.73m2 Final   Comment:   eGFR calculated using 2021 CKD-EPI equation.   Calcium 10.1  8.8 - 10.4 mg/dL Final   Comment:   Reference intervals for this test were updated on 7/16/2024 to reflect our healthy population more accurately. There may be differences in the flagging of prior results with similar values performed with this method. Those prior results can be interpreted in the context of the updated reference intervals.   Chloride 104  98 - 107 mmol/L Final   Glucose 92  70 - 99 mg/dL Final   Alkaline Phosphatase 77  40 - 150 U/L Final   AST 33  0 - 45 U/L Final   ALT 19  0 - 50 U/L Final   Protein Total 7.4  6.4 - 8.3 g/dL Final   Albumin 4.2  3.5 - 5.2 g/dL Final   Bilirubin Total 0.4  <=1.2 mg/dL Final   10/22/2024 12:09 PM     Component Value Flag Ref Range Units Status   WBC Count 7.4  4.0 - 11.0 10e3/uL Final   RBC Count 5.12  3.80 - 5.20 10e6/uL Final   Hemoglobin 14.8  11.7 - 15.7 g/dL Final   Hematocrit 46.3  35.0 - 47.0 % Final   MCV 90  78 - 100 fL Final   MCH 28.9  26.5 - 33.0 pg Final   MCHC 32.0  31.5 - 36.5 g/dL Final   RDW 12.9  10.0 - 15.0 % Final   Platelet Count 192  150 - 450 10e3/uL Final   % Neutrophils 62   % Final   % Lymphocytes 28   % Final   % Monocytes 9   % Final   % Eosinophils 1   % Final   %  Basophils 1   % Final   % Immature Granulocytes 0   % Final   Absolute Neutrophils 4.5  1.6 - 8.3 10e3/uL Final   Absolute Lymphocytes 2.0  0.8 - 5.3 10e3/uL Final   Absolute Monocytes 0.6  0.0 - 1.3 10e3/uL Final   Absolute Eosinophils 0.1  0.0 - 0.7 10e3/uL Final   Absolute Basophils 0.0  0.0 - 0.2 10e3/uL Final   Absolute Immature Granulocytes 0.0  <=0.4 10e3/uL Final     ASSESSMENT/PLAN  Preop general physical exam  - CBC with platelets and differential  - Comprehensive metabolic panel    Paroxysmal atrial fibrillation (H)  - amiodarone (PACERONE) 200 MG tablet; Take 200 mg by mouth daily. 1 tablet daily  - CBC with platelets and differential  - INR point of care (finger stick)  - PRIMARY CARE FOLLOW-UP SCHEDULING; Future   Revised Cardiac Risk Index (RCRI)  The patient has the following serious cardiovascular risks for perioperative complications:   - No serious cardiac risks = 0 points   RCRI Interpretation: 0 points: Class I (very low risk - 0.4% complication rate)  Patient is cleared for surgery.       Signed Electronically by: Drake Lucas MD  A copy of this evaluation report is provided to the requesting physician.

## 2024-10-22 NOTE — PROGRESS NOTES
ANTICOAGULATION MANAGEMENT     Jennjavid Joseph 79 year old female is on warfarin with supratherapeutic INR result. (Goal INR 2.0-3.0)    Recent labs: (last 7 days)     10/22/24  1158   INR 3.5*       ASSESSMENT     Source(s): Chart Review and Patient/Caregiver Call     Warfarin doses taken: Warfarin taken as instructed  Diet: No new diet changes identified  Medication/supplement changes: None noted  New illness, injury, or hospitalization: No  Signs or symptoms of bleeding or clotting: No  Previous result: Supratherapeutic  Additional findings: None       PLAN     Recommended plan for no diet, medication or health factor changes affecting INR     Dosing Instructions: decrease your warfarin dose (9.5% change) with next INR in 1 week       Summary  As of 10/22/2024      Full warfarin instructions:  1 mg every Tue, Fri; 1.5 mg all other days   Next INR check:  10/29/2024               Telephone call with Jenn who verbalizes understanding and agrees to plan    Faxed info to [facility]    Lab visit scheduled    Education provided: Goal range and lab monitoring: goal range and significance of current result    Plan made per [facility] anticoagulation protocol    Marisela Josue RN  10/22/2024  Anticoagulation Clinic  [employer] for routing messages: alee CROUCH  [facility] patient phone line: [phone]        _______________________________________________________________________     Anticoagulation Episode Summary       Current INR goal:  2.0-3.0   TTR:  93.0% (1 y)   Target end date:  Indefinite   Send INR reminders to:  ELIZABETH CROUCH    Indications    Atrial fibrillation  unspecified type (H) [I48.91]  Chronic atrial fibrillation (H) (Resolved) [I48.20]  Paroxysmal atrial fibrillation (H) [I48.0]             Comments:  okay for 8-12 week checks per 2/10/23 message             Anticoagulation Care Providers       Provider Role Specialty Phone number    Drake Lucas MD Referring Internal  Medicine [phone]

## 2024-10-23 LAB
ALBUMIN SERPL BCG-MCNC: 4.2 G/DL (ref 3.5–5.2)
ALP SERPL-CCNC: 77 U/L (ref 40–150)
ALT SERPL W P-5'-P-CCNC: 19 U/L (ref 0–50)
ANION GAP SERPL CALCULATED.3IONS-SCNC: 12 MMOL/L (ref 7–15)
AST SERPL W P-5'-P-CCNC: 33 U/L (ref 0–45)
BILIRUB SERPL-MCNC: 0.4 MG/DL
BUN SERPL-MCNC: 16.1 MG/DL (ref 8–23)
CALCIUM SERPL-MCNC: 10.1 MG/DL (ref 8.8–10.4)
CHLORIDE SERPL-SCNC: 104 MMOL/L (ref 98–107)
CREAT SERPL-MCNC: 1.05 MG/DL (ref 0.51–0.95)
EGFRCR SERPLBLD CKD-EPI 2021: 54 ML/MIN/1.73M2
GLUCOSE SERPL-MCNC: 92 MG/DL (ref 70–99)
HCO3 SERPL-SCNC: 26 MMOL/L (ref 22–29)
POTASSIUM SERPL-SCNC: 4.7 MMOL/L (ref 3.4–5.3)
PROT SERPL-MCNC: 7.4 G/DL (ref 6.4–8.3)
SODIUM SERPL-SCNC: 142 MMOL/L (ref 135–145)

## 2024-10-29 ENCOUNTER — ANTICOAGULATION THERAPY VISIT (OUTPATIENT)
Dept: ANTICOAGULATION | Facility: CLINIC | Age: 79
End: 2024-10-29

## 2024-10-29 ENCOUNTER — LAB (OUTPATIENT)
Dept: LAB | Facility: CLINIC | Age: 79
End: 2024-10-29
Payer: COMMERCIAL

## 2024-10-29 DIAGNOSIS — I48.91 ATRIAL FIBRILLATION, UNSPECIFIED TYPE (H): Primary | ICD-10-CM

## 2024-10-29 DIAGNOSIS — I48.91 A-FIB (H): ICD-10-CM

## 2024-10-29 DIAGNOSIS — I48.0 PAROXYSMAL ATRIAL FIBRILLATION (H): ICD-10-CM

## 2024-10-29 DIAGNOSIS — Z79.01 LONG-TERM (CURRENT) USE OF ANTICOAGULANTS, INR GOAL 2.0-3.0: ICD-10-CM

## 2024-10-29 LAB — INR BLD: 2.7 (ref 0.9–1.1)

## 2024-10-29 PROCEDURE — 36416 COLLJ CAPILLARY BLOOD SPEC: CPT

## 2024-10-29 PROCEDURE — 85610 PROTHROMBIN TIME: CPT

## 2024-10-29 RX ORDER — WARFARIN SODIUM 1 MG/1
TABLET ORAL
Qty: 120 TABLET | Refills: 1 | Status: SHIPPED | OUTPATIENT
Start: 2024-10-29

## 2024-10-29 NOTE — TELEPHONE ENCOUNTER
ANTICOAGULATION MANAGEMENT:  Medication Refill    Anticoagulation Summary  As of 10/22/2024      Warfarin maintenance plan:  1 mg (1 mg x 1) every Tue, Fri; 1.5 mg (1 mg x 1.5) all other days   Next INR check:  10/29/2024   Target end date:  Indefinite    Indications    Atrial fibrillation  unspecified type (H) [I48.91]  Chronic atrial fibrillation (H) (Resolved) [I48.20]  Paroxysmal atrial fibrillation (H) [I48.0]                 Anticoagulation Care Providers       Provider Role Specialty Phone number    VocalDrake MD Referring Internal Medicine [phone]            Refill Criteria    Visit with referring provider/group: Meets criteria: visit within referring provider group in the last 15 months on 10/22/24    [facility] referral last signed: 02/14/2024; within last year: Yes    Lab monitoring not exceeding 2 weeks overdue: Yes    Jenn meets all criteria for refill. Rx instructions and quantity supplied updated to match patient's current dosing plan. Warfarin 90 day supply with 1 refill granted per [facility] protocol     Marisela Josue RN  Anticoagulation Clinic

## 2024-10-29 NOTE — TELEPHONE ENCOUNTER
"Patient is calling for warfarin refill.  \"I accidentally dumped it in the toilet.\"    Informed that it was sent today.    Flaca Reed RN, BSN  [facility]    "

## 2024-10-29 NOTE — PROGRESS NOTES
ANTICOAGULATION MANAGEMENT     Jenn TRAMMELL Jake 79 year old female is on warfarin with therapeutic INR result. (Goal INR 2.0-3.0)    Recent labs: (last 7 days)     10/29/24  1047   INR 2.7*       ASSESSMENT     Source(s): Chart Review and Patient/Caregiver Call     Warfarin doses taken: Warfarin taken as instructed  Diet: No new diet changes identified  Medication/supplement changes: None noted  New illness, injury, or hospitalization: No  Signs or symptoms of bleeding or clotting: No  Previous result: Supratherapeutic  Additional findings: Upcoming Atrial fib/flutter ablation; weekly INR monitoring. To notify  pool and cardiologist if INR < 2 if scheduled, INR > 3.3 within a week of ablation/PVI, or non-compliance with monitoring > 1 week. Per [facility], they would like patient to continue weekly INRs for 4 weeks after ablation. Patient requested to discuss with them on this (states she had not heard this) before scheduling. She does have INR scheduled 1 week post ablation. INR result today will be faxed.       PLAN     Recommended plan for no diet, medication or health factor changes affecting INR     Dosing Instructions: Continue your current warfarin dose with next INR in 1 week       Summary  As of 10/29/2024      Full warfarin instructions:  1 mg every Tue, Fri; 1.5 mg all other days   Next INR check:  11/4/2024               Telephone call with Jenn who verbalizes understanding and agrees to plan    Faxed INR result as requested by [facility]  [phone]    Education provided: Contact [phone] with any changes, questions or concerns.     Plan made per [facility] anticoagulation protocol    Berta Vincent, RN  10/29/2024  Anticoagulation Clinic  [facility] for routing messages: alee CROUCH  [facility] patient phone line: [phone]        _______________________________________________________________________     Anticoagulation Episode Summary       Current  INR goal:  2.0-3.0   TTR:  91.9% (1 y)   Target end date:  Indefinite   Send INR reminders to:  ELIZABETH CROUCH    Indications    Atrial fibrillation  unspecified type (H) [I48.91]  Chronic atrial fibrillation (H) (Resolved) [I48.20]  Paroxysmal atrial fibrillation (H) [I48.0]             Comments:  okay for 8-12 week checks per 2/10/23 message             Anticoagulation Care Providers       Provider Role Specialty Phone number    Drake Lucas MD Referring Internal Medicine [phone]

## 2024-10-30 VITALS
TEMPERATURE: 98.1 F | HEIGHT: 61 IN | RESPIRATION RATE: 20 BRPM | OXYGEN SATURATION: 98 % | DIASTOLIC BLOOD PRESSURE: 85 MMHG | HEART RATE: 72 BPM | BODY MASS INDEX: 25.71 KG/M2 | WEIGHT: 136.2 LBS | SYSTOLIC BLOOD PRESSURE: 138 MMHG

## 2024-11-04 ENCOUNTER — TELEPHONE (OUTPATIENT)
Dept: ANTICOAGULATION | Facility: CLINIC | Age: 79
End: 2024-11-04
Payer: COMMERCIAL

## 2024-11-04 ENCOUNTER — ANTICOAGULATION THERAPY VISIT (OUTPATIENT)
Dept: ANTICOAGULATION | Facility: CLINIC | Age: 79
End: 2024-11-04
Payer: COMMERCIAL

## 2024-11-04 DIAGNOSIS — I48.91 ATRIAL FIBRILLATION, UNSPECIFIED TYPE (H): Primary | ICD-10-CM

## 2024-11-04 DIAGNOSIS — I48.0 PAROXYSMAL ATRIAL FIBRILLATION (H): ICD-10-CM

## 2024-11-04 LAB — INR (EXTERNAL): 2.6

## 2024-11-04 NOTE — PROGRESS NOTES
Opened in ERROR     Stella Yang RN  [facility] Anticoagulation [facility]      Imaging Studies/Medications

## 2024-11-04 NOTE — PROGRESS NOTES
ANTICOAGULATION MANAGEMENT     Jenn Joseph 79 year old female is on warfarin with therapeutic INR result. (Goal INR 2.0-3.0)    Recent labs: (last 7 days)     11/04/24  1200   INR 2.6       ASSESSMENT     Source(s): Chart Review and Patient/Caregiver Call     Warfarin doses taken: Warfarin taken as instructed  Diet: No new diet changes identified  Medication/supplement changes:  Amiodarone stopped on 11/4/24 subsequent INRs may be decreased. Closer INR monitoring recommended.  New illness, injury, or hospitalization: Yes: had ablation done today at [facility]   Signs or symptoms of bleeding or clotting: No  Previous result: Therapeutic last visit; previously outside of goal range  Additional findings: Recent Atrial fib/flutter ablation within 12 weeks; Hx of recent procedure and Amiodarone stopped today: at least weekly INR x 4 advised. Notify cardiologist and  pool if INR <= 1.7 within 4 weeks, procedure/surgery hold requested, or non-compliance with monitoring > 1 week.       PLAN     Recommended plan for temporary change(s) and ongoing change(s) affecting INR     Dosing Instructions: Continue your current warfarin dose with next INR in 1 week       Summary  As of 11/4/2024      Full warfarin instructions:  1 mg every Tue, Fri; 1.5 mg all other days   Next INR check:  11/13/2024               Telephone call with Jenn who verbalizes understanding and agrees to plan    Lab visit scheduled    Education provided: Contact [phone] with any changes, questions or concerns.     Plan made per [facility] anticoagulation protocol    Stella Yang RN  11/4/2024  Anticoagulation Clinic  [facility] for routing messages: alee CROUCH  [facility] patient phone line: [phone]        _______________________________________________________________________     Anticoagulation Episode Summary       Current INR goal:  2.0-3.0   TTR:  91.9% (1 y)   Target end date:  Indefinite   Send INR reminders to:   ELIZABETH CROUCH    Indications    Atrial fibrillation  unspecified type (H) [I48.91]  Chronic atrial fibrillation (H) (Resolved) [I48.20]  Paroxysmal atrial fibrillation (H) [I48.0]             Comments:  okay for 8-12 week checks per 2/10/23 message             Anticoagulation Care Providers       Provider Role Specialty Phone number    Drake Lucas MD Referring Internal Medicine [phone]

## 2024-11-04 NOTE — TELEPHONE ENCOUNTER
Emiliana from [facility] left a voicemail reporting patient had A-fib ablation done today.  INR today 2.6.  Would like to have INR check weekly for 4 weeks with goal above 2.0.  If INR drop below 2.0 please contact [facility] for further instruction.  Also noted that they will discontinue amiodarone.  Emiliana can be reach at  option 2 with further questions.      Dina Roberts RN  [facility] Anticoagulation Clinic.

## 2024-11-13 ENCOUNTER — ANTICOAGULATION THERAPY VISIT (OUTPATIENT)
Dept: ANTICOAGULATION | Facility: CLINIC | Age: 79
End: 2024-11-13

## 2024-11-13 ENCOUNTER — LAB (OUTPATIENT)
Dept: LAB | Facility: CLINIC | Age: 79
End: 2024-11-13
Payer: COMMERCIAL

## 2024-11-13 DIAGNOSIS — I48.0 PAROXYSMAL ATRIAL FIBRILLATION (H): ICD-10-CM

## 2024-11-13 DIAGNOSIS — I48.91 A-FIB (H): ICD-10-CM

## 2024-11-13 DIAGNOSIS — I48.91 ATRIAL FIBRILLATION, UNSPECIFIED TYPE (H): Primary | ICD-10-CM

## 2024-11-13 LAB — INR BLD: 2.4 (ref 0.9–1.1)

## 2024-11-13 PROCEDURE — 85610 PROTHROMBIN TIME: CPT

## 2024-11-13 PROCEDURE — 36415 COLL VENOUS BLD VENIPUNCTURE: CPT

## 2024-11-13 NOTE — PROGRESS NOTES
ANTICOAGULATION MANAGEMENT     Jenn Joseph 79 year old female is on warfarin with therapeutic INR result. (Goal INR 2.0-3.0)    Recent labs: (last 7 days)     11/13/24  1019   INR 2.4*       ASSESSMENT     Source(s): Chart Review and Patient/Caregiver Call     Warfarin doses taken: Warfarin taken as instructed  Diet: No new diet changes identified  Medication/supplement changes: None noted  New illness, injury, or hospitalization: No  Signs or symptoms of bleeding or clotting: No  Previous result: Therapeutic last 2(+) visits  Additional findings: Recent Atrial fib/flutter ablation within 12 weeks; and Amiodarone stopped 11/4/24. Notify cardiologist and  pool if INR <= 1.7 within 4 weeks, procedure/surgery hold requested, or non-compliance with monitoring > 1 week.       PLAN     Recommended plan for no diet, medication or health factor changes affecting INR     Dosing Instructions: Continue your current warfarin dose with next INR in 1 week       Summary  As of 11/13/2024      Full warfarin instructions:  1 mg every Tue, Fri; 1.5 mg all other days   Next INR check:  11/20/2024               Telephone call with Jenn who verbalizes understanding and agrees to plan  Faxed results to [facility]    Lab visit scheduled    Education provided: Goal range and lab monitoring: goal range and significance of current result    Plan made per [facility] anticoagulation protocol    Marisela Josue RN  11/13/2024  Anticoagulation Clinic  [facility] for routing messages: alee CROUCH  [facility] patient phone line: [phone]        _______________________________________________________________________     Anticoagulation Episode Summary       Current INR goal:  2.0-3.0   TTR:  91.9% (1 y)   Target end date:  Indefinite   Send INR reminders to:  ELIZABETH CROUCH    Indications    Atrial fibrillation  unspecified type (H) [I48.91]  Chronic atrial fibrillation (H) (Resolved)  [I48.20]  Paroxysmal atrial fibrillation (H) [I48.0]             Comments:  okay for 8-12 week checks per 2/10/23 message             Anticoagulation Care Providers       Provider Role Specialty Phone number    Drake Lucas MD Referring Internal Medicine [phone]

## 2024-11-20 ENCOUNTER — LAB (OUTPATIENT)
Dept: LAB | Facility: CLINIC | Age: 79
End: 2024-11-20
Payer: COMMERCIAL

## 2024-11-20 ENCOUNTER — ANTICOAGULATION THERAPY VISIT (OUTPATIENT)
Dept: ANTICOAGULATION | Facility: CLINIC | Age: 79
End: 2024-11-20

## 2024-11-20 DIAGNOSIS — I48.91 ATRIAL FIBRILLATION, UNSPECIFIED TYPE (H): Primary | ICD-10-CM

## 2024-11-20 DIAGNOSIS — I48.0 PAROXYSMAL ATRIAL FIBRILLATION (H): ICD-10-CM

## 2024-11-20 DIAGNOSIS — I48.91 A-FIB (H): ICD-10-CM

## 2024-11-20 LAB — INR BLD: 2.6 (ref 0.9–1.1)

## 2024-11-20 PROCEDURE — 36416 COLLJ CAPILLARY BLOOD SPEC: CPT

## 2024-11-20 PROCEDURE — 85610 PROTHROMBIN TIME: CPT

## 2024-11-20 NOTE — PROGRESS NOTES
ANTICOAGULATION MANAGEMENT     Jennjavid Joseph 79 year old female is on warfarin with therapeutic INR result. (Goal INR 2.0-3.0)    Recent labs: (last 7 days)     11/20/24  1025   INR 2.6*       ASSESSMENT     Warfarin Lab Questionnaire    Warfarin Doses Last 7 Days      11/20/2024    10:25 AM   Dose in Tablet or Mg   TAB or MG? milligram (mg)     Pt Rptd Dose SUNDAY MONDAY TUESDAY WED THURS FRIDAY SATURDAY 11/20/2024  10:25 AM 1 1 1.5 1 1 1.5 1   Warfarin dosing verbally confirmed with patient-- taking as instructed, 1 mg every Tue, Fri; 1.5 mg all other days not as entered in the above questionnaire        11/20/2024   Warfarin Lab Questionnaire   Missed doses within past 14 days? No   Changes in diet or alcohol within past 14 days? No   Medication changes since last result? No   Injuries or illness since last result? No   New shortness of breath, severe headaches or sudden changes in vision since last result? No   Abnormal bleeding since last result? No   Upcoming surgery, procedure? No        Previous result: Therapeutic last 2(+) visits  Additional findings: Recent Atrial fib/flutter ablation within 12 weeks; Hx of procedure and amiodarone stopped 11/4/24: at least weekly INR x 4 advised. Notify cardiologist and EP pool if INR <= 1.7 within 4 weeks, procedure/surgery hold requested, or non-compliance with monitoring > 1 week.If INR drop below 2.0 please contact [facility] for further instruction. Emiliana can be reach at  option 2 with further questions.        PLAN     Recommended plan for no diet, medication or health factor changes affecting INR     Dosing Instructions: Continue your current warfarin dose with next INR in 1 week       Summary  As of 11/20/2024      Full warfarin instructions:  1 mg every Tue, Fri; 1.5 mg all other days   Next INR check:  11/27/2024               Telephone call with Jenn who agrees to plan and repeated back plan correctly    Lab visit  scheduled    Education provided: Please call back if any changes to your diet, medications or how you've been taking warfarin  Contact [phone] with any changes, questions or concerns.     Plan made per [facility] anticoagulation protocol    Antonia Fergsuon RN  11/20/2024  Anticoagulation Clinic  [facility] for routing messages: alee CROUCH  [facility] patient phone line: [phone]        _______________________________________________________________________     Anticoagulation Episode Summary       Current INR goal:  2.0-3.0   TTR:  91.9% (1 y)   Target end date:  Indefinite   Send INR reminders to:  ELIZABETH CROUCH    Indications    Atrial fibrillation  unspecified type (H) [I48.91]  Chronic atrial fibrillation (H) (Resolved) [I48.20]  Paroxysmal atrial fibrillation (H) [I48.0]             Comments:  okay for 8-12 week checks per 2/10/23 message             Anticoagulation Care Providers       Provider Role Specialty Phone number    Vocal, Drake Lam MD Referring Internal Medicine [phone]

## 2024-11-27 ENCOUNTER — LAB (OUTPATIENT)
Dept: LAB | Facility: CLINIC | Age: 79
End: 2024-11-27
Payer: COMMERCIAL

## 2024-11-27 ENCOUNTER — ANTICOAGULATION THERAPY VISIT (OUTPATIENT)
Dept: ANTICOAGULATION | Facility: CLINIC | Age: 79
End: 2024-11-27

## 2024-11-27 DIAGNOSIS — I48.0 PAROXYSMAL ATRIAL FIBRILLATION (H): ICD-10-CM

## 2024-11-27 DIAGNOSIS — I48.91 A-FIB (H): ICD-10-CM

## 2024-11-27 DIAGNOSIS — I48.91 ATRIAL FIBRILLATION, UNSPECIFIED TYPE (H): Primary | ICD-10-CM

## 2024-11-27 LAB — INR BLD: 1.9 (ref 0.9–1.1)

## 2024-11-27 PROCEDURE — 36416 COLLJ CAPILLARY BLOOD SPEC: CPT

## 2024-11-27 PROCEDURE — 85610 PROTHROMBIN TIME: CPT

## 2024-11-27 NOTE — PROGRESS NOTES
ANTICOAGULATION MANAGEMENT     Jenn Joseph 79 year old female is on warfarin with subtherapeutic INR result. (Goal INR 2.0-3.0)    Recent labs: (last 7 days)     11/27/24  1007   INR 1.9*       ASSESSMENT     Source(s): Chart Review and Patient/Caregiver Call     Warfarin doses taken: Warfarin taken as instructed  Diet: No new diet changes identified  Medication/supplement changes: None noted  New illness, injury, or hospitalization: No  Signs or symptoms of bleeding or clotting: No  Previous result: Therapeutic last 2(+) visits  Additional findings:  recent ablation  11/4/24     PLAN     Recommended plan for no diet, medication or health factor changes affecting INR     Dosing Instructions: Increase your warfarin dose (10.5% change) with next INR in 1 week   - pt read back instructions to RN.    RN called and notified triage at [facility] for INR below 2.0 due to recent ablation. Fax sent.    Summary  As of 11/27/2024      Full warfarin instructions:  1.5 mg every day   Next INR check:  12/4/2024               Telephone call with Jenn who verbalizes understanding and agrees to plan    Lab visit scheduled    Education provided: Please call back if any changes to your diet, medications or how you've been taking warfarin  Symptom monitoring: monitoring for clotting signs and symptoms, monitoring for stroke signs and symptoms, and when to seek medical attention/emergency care    Plan made per [facility] anticoagulation protocol    Miranda Abreu RN  11/27/2024  Anticoagulation Clinic  [facility] for routing messages: alee CROUCH  [facility] patient phone line: [phone]        _______________________________________________________________________     Anticoagulation Episode Summary       Current INR goal:  2.0-3.0   TTR:  91.6% (1 y)   Target end date:  Indefinite   Send INR reminders to:  ELIZABETH CROUCH    Indications    Atrial fibrillation  unspecified type (H) [I48.91]  Chronic atrial  fibrillation (H) (Resolved) [I48.20]  Paroxysmal atrial fibrillation (H) [I48.0]             Comments:  okay for 8-12 week checks per 2/10/23 message             Anticoagulation Care Providers       Provider Role Specialty Phone number    Drake Lucas MD Referring Internal Medicine [phone]

## 2024-12-03 DIAGNOSIS — K21.9 GASTROESOPHAGEAL REFLUX DISEASE WITHOUT ESOPHAGITIS: ICD-10-CM

## 2024-12-04 ENCOUNTER — LAB (OUTPATIENT)
Dept: LAB | Facility: CLINIC | Age: 79
End: 2024-12-04
Payer: COMMERCIAL

## 2024-12-04 ENCOUNTER — ANTICOAGULATION THERAPY VISIT (OUTPATIENT)
Dept: ANTICOAGULATION | Facility: CLINIC | Age: 79
End: 2024-12-04

## 2024-12-04 DIAGNOSIS — I48.0 PAROXYSMAL ATRIAL FIBRILLATION (H): ICD-10-CM

## 2024-12-04 DIAGNOSIS — I48.91 A-FIB (H): ICD-10-CM

## 2024-12-04 DIAGNOSIS — I48.91 ATRIAL FIBRILLATION, UNSPECIFIED TYPE (H): Primary | ICD-10-CM

## 2024-12-04 LAB — INR BLD: 2.4 (ref 0.9–1.1)

## 2024-12-04 PROCEDURE — 85610 PROTHROMBIN TIME: CPT

## 2024-12-04 PROCEDURE — 36416 COLLJ CAPILLARY BLOOD SPEC: CPT

## 2024-12-04 NOTE — PROGRESS NOTES
ANTICOAGULATION MANAGEMENT     Jenn Joseph 79 year old female is on warfarin with therapeutic INR result. (Goal INR 2.0-3.0)    Recent labs: (last 7 days)     12/04/24  1025   INR 2.4*       ASSESSMENT     Source(s): Chart Review and Patient/Caregiver Call     Warfarin doses taken: Warfarin taken as instructed  Diet: No new diet changes identified  Medication/supplement changes: None noted  New illness, injury, or hospitalization: No  Signs or symptoms of bleeding or clotting: No  Previous result: Subtherapeutic  Additional findings: None       PLAN     Recommended plan for no diet, medication or health factor changes affecting INR     Dosing Instructions: Continue your current warfarin dose with next INR in 1 week       Summary  As of 12/4/2024      Full warfarin instructions:  1.5 mg every day   Next INR check:  12/11/2024               Telephone call with Jenn who verbalizes understanding and agrees to plan    Pt said she will call back after she looks at her schedule     Education provided: Please call back if any changes to your diet, medications or how you've been taking warfarin    Plan made per [facility] anticoagulation protocol    Paola Moreno RN  12/4/2024  Anticoagulation Clinic  [employer] for routing messages: alee CROUCH  [facility] patient phone line: [phone]        _______________________________________________________________________     Anticoagulation Episode Summary       Current INR goal:  2.0-3.0   TTR:  91.2% (1 y)   Target end date:  Indefinite   Send INR reminders to:  ELIZABETH CROUCH    Indications    Atrial fibrillation  unspecified type (H) [I48.91]  Chronic atrial fibrillation (H) (Resolved) [I48.20]  Paroxysmal atrial fibrillation (H) [I48.0]             Comments:  okay for 8-12 week checks per 2/10/23 message             Anticoagulation Care Providers       Provider Role Specialty Phone number    Drake Lucas MD Referring Internal Medicine  [phone]

## 2024-12-04 NOTE — PROGRESS NOTES
ANTICOAGULATION MANAGEMENT     Jenn Joseph 79 year old female is on warfarin with therapeutic INR result. (Goal INR 2.0-3.0)    Recent labs: (last 7 days)     12/04/24  1025   INR 2.4*       ASSESSMENT     Source(s): Chart Review  Previous INR was Subtherapeutic  Medication, diet, health changes since last INR chart reviewed; none identified         PLAN     Unable to reach pt today.    Pt not available as of right now - her  asks that [facility] call back around 3pm     Follow up required to confirm warfarin dose taken and assess for changes    Paola Moreno, RN  12/4/2024  Anticoagulation Clinic  [facility] for routing messages: alee CROUCH  [facility] patient phone line: [phone]

## 2024-12-11 ENCOUNTER — ANTICOAGULATION THERAPY VISIT (OUTPATIENT)
Dept: ANTICOAGULATION | Facility: CLINIC | Age: 79
End: 2024-12-11

## 2024-12-11 ENCOUNTER — LAB (OUTPATIENT)
Dept: LAB | Facility: CLINIC | Age: 79
End: 2024-12-11
Payer: COMMERCIAL

## 2024-12-11 DIAGNOSIS — I48.91 A-FIB (H): ICD-10-CM

## 2024-12-11 DIAGNOSIS — I48.0 PAROXYSMAL ATRIAL FIBRILLATION (H): ICD-10-CM

## 2024-12-11 DIAGNOSIS — I48.91 ATRIAL FIBRILLATION, UNSPECIFIED TYPE (H): Primary | ICD-10-CM

## 2024-12-11 LAB — INR BLD: 2.5 (ref 0.9–1.1)

## 2024-12-11 PROCEDURE — 36416 COLLJ CAPILLARY BLOOD SPEC: CPT

## 2024-12-11 PROCEDURE — 85610 PROTHROMBIN TIME: CPT

## 2024-12-11 NOTE — PROGRESS NOTES
ANTICOAGULATION MANAGEMENT     Jenn Joseph 79 year old female is on warfarin with therapeutic INR result. (Goal INR 2.0-3.0)    Recent labs: (last 7 days)     12/11/24  1112   INR 2.5*       ASSESSMENT     Source(s): Chart Review and Patient/Caregiver Call     Warfarin doses taken: Warfarin taken as instructed  Diet: No new diet changes identified  Medication/supplement changes:  5 weeks since stopping Amiodarone  New illness, injury, or hospitalization: No  Signs or symptoms of bleeding or clotting: No  Previous result: Therapeutic last visit; previously outside of goal range  Additional findings:  recent ablation on 11/4/24 at [facility] Was instructed to do weekly INR checks for 4 weeks. This is week 5 post procedure.        PLAN     Recommended plan for no diet, medication or health factor changes affecting INR     Dosing Instructions: Continue your current warfarin dose with next INR in 2 weeks       Summary  As of 12/11/2024      Full warfarin instructions:  1.5 mg every day   Next INR check:  12/26/2024               Telephone call with Jenn who agrees to plan and repeated back plan correctly    Patient elected to schedule next visit 12/31    Education provided: Please call back if any changes to your diet, medications or how you've been taking warfarin  Goal range and lab monitoring: goal range and significance of current result and Importance of therapeutic range  Contact [phone] with any changes, questions or concerns.     Plan made per [facility] anticoagulation protocol    Lily Alexander RN  12/11/2024  Anticoagulation Clinic  [facility] for routing messages: alee CROUCH  [facility] patient phone line: [phone]        _______________________________________________________________________     Anticoagulation Episode Summary       Current INR goal:  2.0-3.0   TTR:  91.2% (1 y)   Target end date:  Indefinite   Send INR reminders to:  ELIZABETH CROUCH    Indications     Atrial fibrillation  unspecified type (H) [I48.91]  Chronic atrial fibrillation (H) (Resolved) [I48.20]  Paroxysmal atrial fibrillation (H) [I48.0]             Comments:  okay for 8-12 week checks per 2/10/23 message             Anticoagulation Care Providers       Provider Role Specialty Phone number    Drake Lucas MD Referring Internal Medicine [phone]

## 2024-12-24 ENCOUNTER — TELEPHONE (OUTPATIENT)
Dept: FAMILY MEDICINE | Facility: CLINIC | Age: 79
End: 2024-12-24
Payer: COMMERCIAL

## 2024-12-24 NOTE — TELEPHONE ENCOUNTER
Patient calling with concerns of being covid positive. Her  tested positive for covid last week. Patient now has the following symptoms, fatigue, dry throat, cough and runny nose. Patient;s symptoms started Last Thursday.  Per patient she took an  home covid test. The test was positive.   Advised patient that even though the test was , I would believe the positive result. Advised patient to stay hydrated, rest and stay home. Patient does not want paxlovid at this time.   No further questions/concerns.       Rebeka VALENCIAN,  RN  [facility]

## 2024-12-31 ENCOUNTER — LAB (OUTPATIENT)
Dept: LAB | Facility: CLINIC | Age: 79
End: 2024-12-31
Payer: COMMERCIAL

## 2024-12-31 ENCOUNTER — TELEPHONE (OUTPATIENT)
Dept: FAMILY MEDICINE | Facility: CLINIC | Age: 79
End: 2024-12-31

## 2024-12-31 ENCOUNTER — DOCUMENTATION ONLY (OUTPATIENT)
Dept: ANTICOAGULATION | Facility: CLINIC | Age: 79
End: 2024-12-31

## 2024-12-31 ENCOUNTER — ANTICOAGULATION THERAPY VISIT (OUTPATIENT)
Dept: ANTICOAGULATION | Facility: CLINIC | Age: 79
End: 2024-12-31

## 2024-12-31 DIAGNOSIS — I48.91 A-FIB (H): ICD-10-CM

## 2024-12-31 DIAGNOSIS — I48.91 ATRIAL FIBRILLATION, UNSPECIFIED TYPE (H): Primary | ICD-10-CM

## 2024-12-31 DIAGNOSIS — Z79.01 CHRONIC ANTICOAGULATION: Primary | ICD-10-CM

## 2024-12-31 DIAGNOSIS — I48.0 PAROXYSMAL ATRIAL FIBRILLATION (H): ICD-10-CM

## 2024-12-31 LAB — INR BLD: 3.2 (ref 0.9–1.1)

## 2024-12-31 PROCEDURE — 36416 COLLJ CAPILLARY BLOOD SPEC: CPT

## 2024-12-31 PROCEDURE — 85610 PROTHROMBIN TIME: CPT

## 2024-12-31 NOTE — PROGRESS NOTES
ANTICOAGULATION MANAGEMENT     Jenn Joseph 79 year old female is on warfarin with supratherapeutic INR result. (Goal INR 2.0-3.0)    Recent labs: (last 7 days)     12/31/24  1014   INR 3.2*       ASSESSMENT     Source(s): Chart Review and Patient/Caregiver Call     Warfarin doses taken: Warfarin taken as instructed  Diet:  had a couple glasses of wine  Medication/supplement changes: atenolol was increased  New illness, injury, or hospitalization: had covid last week  Signs or symptoms of bleeding or clotting: No  Previous result: Therapeutic last 2(+) visits  Additional findings: None       PLAN     Recommended plan for temporary change(s) affecting INR     Dosing Instructions: Continue your current warfarin dose with next INR in 2 weeks       Summary  As of 12/31/2024      Full warfarin instructions:  1.5 mg every day   Next INR check:  1/14/2025               Telephone call with Jenn who verbalizes understanding and agrees to plan    Lab visit scheduled    Education provided: Goal range and lab monitoring: goal range and significance of current result    Plan made per [facility] anticoagulation protocol    Marisela Josue RN  12/31/2024  Anticoagulation Clinic  [employer] for routing messages: alee CROUCH  [facility] patient phone line: [phone]        _______________________________________________________________________     Anticoagulation Episode Summary       Current INR goal:  2.0-3.0   TTR:  89.6% (1 y)   Target end date:  Indefinite   Send INR reminders to:  ELIZABETH CROUCH    Indications    Atrial fibrillation  unspecified type (H) [I48.91]  Chronic atrial fibrillation (H) (Resolved) [I48.20]  Paroxysmal atrial fibrillation (H) [I48.0]             Comments:  okay for 8-12 week checks per 2/10/23 message             Anticoagulation Care Providers       Provider Role Specialty Phone number    Drake Lucas MD Referring Internal Medicine [phone]

## 2024-12-31 NOTE — PROGRESS NOTES
ANTICOAGULATION CLINIC REFERRAL RENEWAL REQUEST       An annual renewal order is required for all patients referred to [facility] Anticoagulation Clinic.?  Please review and sign the pended referral order for Jenn Joseph.       ANTICOAGULATION SUMMARY      Warfarin indication(s)   Atrial Fibrillation    Mechanical heart valve present?  NO       Current goal range   INR: 2.0-3.0     Goal appropriate for indication? Goal INR 2-3, standard for indication(s) above     Time in Therapeutic Range (TTR)  (Goal > 60%) 91.2%       Office visit with referring provider's group within last year yes on 10/22/24       Marisela Josue RN  [facility] Anticoagulation Clinic

## 2024-12-31 NOTE — TELEPHONE ENCOUNTER
See anticoag encounter for 12/31/24    Marisela Josue RN   [facility] Anticoagulation Clinic

## 2024-12-31 NOTE — TELEPHONE ENCOUNTER
Patient Returning Call    Reason for call:  RETURNING CALL TO  JULIO JOHNSON RN    Information relayed to patient:  OUT OF RANGE RESULT AND LOOKING FOR A CAUSE.   LUPE BARCLAY MEDS WERE TAKEN  PRESCRIBED.  HAD 2 GLASSES OF WINE AND POSSIBLY TO MUCH GREENS.    Patient has additional questions:  Yes    What are your questions/concerns:  WAITING FOR NEW DOSING INSTRUCTIONS    Who does the patient want to speak with:  RN    Is an  needed?:  No      Could we send this information to you in [employer] or would you prefer to receive a phone call?:   Patient would prefer a phone call   Okay to leave a detailed message?: Yes at Cell number on file:    Telephone Information:   Mobile [phone]

## 2024-12-31 NOTE — PROGRESS NOTES
ANTICOAGULATION MANAGEMENT     Jenn Joseph 79 year old female is on warfarin with supratherapeutic INR result. (Goal INR 2.0-3.0)    Recent labs: (last 7 days)     12/31/24  1014   INR 3.2*       ASSESSMENT     Source(s): Chart Review  Previous INR was Therapeutic last 2(+) visits  Medication, diet, health changes since last INR chart reviewed; none identified         PLAN     Unable to reach Jenn today.    LMTCB    Follow up required to confirm warfarin dose taken and assess for changes and discuss out of range result     Marisela Josue RN  12/31/2024  Anticoagulation Clinic  [facility] for routing messages: alee CROUCH  [facility] patient phone line: [phone]

## 2025-01-02 PROBLEM — Z79.01 CHRONIC ANTICOAGULATION: Status: ACTIVE | Noted: 2025-01-02

## 2025-01-14 ENCOUNTER — ANTICOAGULATION THERAPY VISIT (OUTPATIENT)
Dept: ANTICOAGULATION | Facility: CLINIC | Age: 80
End: 2025-01-14

## 2025-01-14 ENCOUNTER — LAB (OUTPATIENT)
Dept: LAB | Facility: CLINIC | Age: 80
End: 2025-01-14
Payer: COMMERCIAL

## 2025-01-14 DIAGNOSIS — Z79.01 CHRONIC ANTICOAGULATION: ICD-10-CM

## 2025-01-14 DIAGNOSIS — Z79.01 LONG-TERM (CURRENT) USE OF ANTICOAGULANTS, INR GOAL 2.0-3.0: ICD-10-CM

## 2025-01-14 DIAGNOSIS — I48.91 ATRIAL FIBRILLATION, UNSPECIFIED TYPE (H): Primary | ICD-10-CM

## 2025-01-14 DIAGNOSIS — I10 HYPERTENSION GOAL BP (BLOOD PRESSURE) < 140/90: ICD-10-CM

## 2025-01-14 DIAGNOSIS — I48.0 PAROXYSMAL ATRIAL FIBRILLATION (H): ICD-10-CM

## 2025-01-14 LAB — INR BLD: 2.5 (ref 0.9–1.1)

## 2025-01-14 PROCEDURE — 36416 COLLJ CAPILLARY BLOOD SPEC: CPT

## 2025-01-14 PROCEDURE — 85610 PROTHROMBIN TIME: CPT

## 2025-01-14 RX ORDER — WARFARIN SODIUM 1 MG/1
TABLET ORAL
Qty: 135 TABLET | Refills: 1 | Status: SHIPPED | OUTPATIENT
Start: 2025-01-14

## 2025-01-14 NOTE — PROGRESS NOTES
ANTICOAGULATION MANAGEMENT     Jenn Joseph 79 year old female is on warfarin with therapeutic INR result. (Goal INR 2.0-3.0)    Recent labs: (last 7 days)     01/14/25  1019   INR 2.5*       ASSESSMENT     Source(s): Chart Review and Patient/Caregiver Call     Warfarin doses taken: Warfarin taken as instructed  Diet: No new diet changes identified  Medication/supplement changes: None noted  New illness, injury, or hospitalization: No  Signs or symptoms of bleeding or clotting: No  Previous result: Supratherapeutic  Additional findings: None       PLAN     Recommended plan for no diet, medication or health factor changes affecting INR     Dosing Instructions: Continue your current warfarin dose with next INR in 3 weeks       Summary  As of 1/14/2025      Full warfarin instructions:  1.5 mg every day   Next INR check:  2/4/2025               Telephone call with Jenn who verbalizes understanding and agrees to plan    Lab visit scheduled    Education provided: Goal range and lab monitoring: goal range and significance of current result    Plan made per [facility] anticoagulation protocol    Marisela Josue RN  1/14/2025  Anticoagulation Clinic  [employer] for routing messages: alee CROUCH  [facility] patient phone line: [phone]        _______________________________________________________________________     Anticoagulation Episode Summary       Current INR goal:  2.0-3.0   TTR:  88.5% (1 y)   Target end date:  Indefinite   Send INR reminders to:  ELIZABETH CROUCH    Indications    Atrial fibrillation  unspecified type (H) [I48.91]  Chronic atrial fibrillation (H) (Resolved) [I48.20]  Paroxysmal atrial fibrillation (H) [I48.0]  Chronic anticoagulation [Z79.01]             Comments:  okay for 8-12 week checks per 2/10/23 message             Anticoagulation Care Providers       Provider Role Specialty Phone number    Drake Lucas MD Referring Internal Medicine [phone]

## 2025-01-15 RX ORDER — LISINOPRIL 5 MG/1
5 TABLET ORAL DAILY
Qty: 90 TABLET | Refills: 1 | Status: SHIPPED | OUTPATIENT
Start: 2025-01-15

## 2025-02-04 ENCOUNTER — ANTICOAGULATION THERAPY VISIT (OUTPATIENT)
Dept: ANTICOAGULATION | Facility: CLINIC | Age: 80
End: 2025-02-04

## 2025-02-04 ENCOUNTER — LAB (OUTPATIENT)
Dept: LAB | Facility: CLINIC | Age: 80
End: 2025-02-04
Payer: COMMERCIAL

## 2025-02-04 DIAGNOSIS — I48.0 PAROXYSMAL ATRIAL FIBRILLATION (H): ICD-10-CM

## 2025-02-04 DIAGNOSIS — Z79.01 CHRONIC ANTICOAGULATION: ICD-10-CM

## 2025-02-04 DIAGNOSIS — I48.91 ATRIAL FIBRILLATION, UNSPECIFIED TYPE (H): Primary | ICD-10-CM

## 2025-02-04 LAB — INR BLD: 2.2 (ref 0.9–1.1)

## 2025-02-04 PROCEDURE — 85610 PROTHROMBIN TIME: CPT

## 2025-02-04 PROCEDURE — 36416 COLLJ CAPILLARY BLOOD SPEC: CPT

## 2025-02-04 NOTE — PROGRESS NOTES
"ANTICOAGULATION MANAGEMENT     Jenn Joseph 79 year old female is on warfarin with therapeutic INR result. (Goal INR 2.0-3.0)    Recent labs: (last 7 days)     02/04/25  1016   INR 2.2*       ASSESSMENT     Warfarin Lab Questionnaire    Warfarin Doses Last 7 Days      2/4/2025    10:15 AM   Dose in Tablet or Mg   TAB or MG? tablet (tab)     Pt Rptd Dose SUNDAY MONDAY TUESDAY WED THURS FRIDAY SATURDAY 2/4/2025  10:15 AM 1.5 1.5 1.5 1.5 1.5 1.5 1.5         2/4/2025   Warfarin Lab Questionnaire   Missed doses within past 14 days? No   Changes in diet or alcohol within past 14 days? No   Medication changes since last result? No   Injuries or illness since last result? No   New shortness of breath, severe headaches or sudden changes in vision since last result? Yes   Abnormal bleeding since last result? No   Upcoming surgery, procedure? No   Best number to call with results? Jenn HELTON     Previous result: Therapeutic last visit; previously outside of goal range  Additional findings:  Discussed patient's \"yes\" response to new shortness of breath, severe headache, or sudden changes in vision in questionnaire. Patient reports this is not new for her, it is ongoing symptoms of her atrial fibrillation and PVCs, she is following closely with Cardiology.       PLAN     Recommended plan for no diet, medication or health factor changes affecting INR     Dosing Instructions: Continue your current warfarin dose with next INR in 4 weeks       Summary  As of 2/4/2025      Full warfarin instructions:  1.5 mg every day   Next INR check:  3/4/2025               Telephone call with Jenn who verbalizes understanding and agrees to plan and who agrees to plan and repeated back plan correctly    Lab visit scheduled    Education provided: Contact [phone] with any changes, questions or concerns.     Plan made per [facility] anticoagulation protocol    Berta Vincent, RN  2/4/2025  Anticoagulation Clinic  [facility] for routing " messages: alee CROUCH  [facility] patient phone line: [phone]        _______________________________________________________________________     Anticoagulation Episode Summary       Current INR goal:  2.0-3.0   TTR:  88.5% (1 y)   Target end date:  Indefinite   Send INR reminders to:  ELIZABETH CROUCH    Indications    Atrial fibrillation  unspecified type (H) [I48.91]  Chronic atrial fibrillation (H) (Resolved) [I48.20]  Paroxysmal atrial fibrillation (H) [I48.0]  Chronic anticoagulation [Z79.01]             Comments:  okay for 8-12 week checks per 2/10/23 message             Anticoagulation Care Providers       Provider Role Specialty Phone number    Drake Lucas MD Referring Internal Medicine [phone]

## 2025-02-10 NOTE — PROGRESS NOTES
Jenn Joseph has an upcoming lab appointment:    Future Appointments   Date Time Provider Department Center   2022 10:15 AM [facility]     Patient is scheduled for the following lab(s): INR point of care. Orders are . Please place new standing order.       Katia Mcallister  
Standing orders entered.    Stella Yang RN  [facility] Anticoagulation [facility]     
6 (moderate pain)

## 2025-03-03 DIAGNOSIS — K21.9 GASTROESOPHAGEAL REFLUX DISEASE WITHOUT ESOPHAGITIS: ICD-10-CM

## 2025-03-03 RX ORDER — FAMOTIDINE 20 MG/1
TABLET, FILM COATED ORAL
Qty: 180 TABLET | Refills: 1 | Status: SHIPPED | OUTPATIENT
Start: 2025-03-03

## 2025-03-04 ENCOUNTER — ANTICOAGULATION THERAPY VISIT (OUTPATIENT)
Dept: ANTICOAGULATION | Facility: CLINIC | Age: 80
End: 2025-03-04

## 2025-03-04 ENCOUNTER — TELEPHONE (OUTPATIENT)
Dept: FAMILY MEDICINE | Facility: CLINIC | Age: 80
End: 2025-03-04

## 2025-03-04 ENCOUNTER — LAB (OUTPATIENT)
Dept: LAB | Facility: CLINIC | Age: 80
End: 2025-03-04
Payer: COMMERCIAL

## 2025-03-04 DIAGNOSIS — Z79.01 CHRONIC ANTICOAGULATION: ICD-10-CM

## 2025-03-04 DIAGNOSIS — I48.91 ATRIAL FIBRILLATION, UNSPECIFIED TYPE (H): Primary | ICD-10-CM

## 2025-03-04 DIAGNOSIS — I10 HYPERTENSION GOAL BP (BLOOD PRESSURE) < 140/90: ICD-10-CM

## 2025-03-04 DIAGNOSIS — E78.5 HYPERLIPIDEMIA LDL GOAL <100: Primary | ICD-10-CM

## 2025-03-04 DIAGNOSIS — I48.0 PAROXYSMAL ATRIAL FIBRILLATION (H): ICD-10-CM

## 2025-03-04 LAB — INR BLD: 2 (ref 0.9–1.1)

## 2025-03-04 PROCEDURE — 85610 PROTHROMBIN TIME: CPT

## 2025-03-04 PROCEDURE — 36416 COLLJ CAPILLARY BLOOD SPEC: CPT

## 2025-03-04 NOTE — PROGRESS NOTES
ANTICOAGULATION MANAGEMENT     Jenn JP Joseph 80 year old female is on warfarin with therapeutic INR result. (Goal INR 2.0-3.0)    Recent labs: (last 7 days)     03/04/25  1041   INR 2.0*       ASSESSMENT     Warfarin Lab Questionnaire    Warfarin Doses Last 7 Days      3/4/2025    10:45 AM   Dose in Tablet or Mg   TAB or MG? tablet (tab)     Pt Rptd Dose MECHE MONDAY TUESDAY WED THURS FRIDAY SATURDAY   3/4/2025  10:45 AM 1.5 1.5 1.5 1.5 1.5 1.5 1.5         3/4/2025   Warfarin Lab Questionnaire   Missed doses within past 14 days? No   Changes in diet or alcohol within past 14 days? No   Medication changes since last result? No   Injuries or illness since last result? No   New shortness of breath, severe headaches or sudden changes in vision since last result? No   Abnormal bleeding since last result? No   Upcoming surgery, procedure? No   Best number to call with results? [phone]     Previous result: Therapeutic last 2(+) visits  Additional findings: None       PLAN     Recommended plan for no diet, medication or health factor changes affecting INR     Dosing Instructions: Continue your current warfarin dose with next INR in 5 weeks       Summary  As of 3/4/2025      Full warfarin instructions:  1.5 mg every day   Next INR check:  4/15/2025               Telephone call with Jenn who verbalizes understanding and agrees to plan    Patient elected to schedule next visit in 6 weeks    Education provided: Goal range and lab monitoring: goal range and significance of current result    Plan made per [facility] anticoagulation protocol    Marisela Josue RN  3/4/2025  Anticoagulation Clinic  [employer] for routing messages: alee CROUCH  [facility] patient phone line: [phone]        _______________________________________________________________________     Anticoagulation Episode Summary       Current INR goal:  2.0-3.0   TTR:  88.5% (1 y)   Target end date:  Indefinite   Send INR reminders to:  ELIZABETH  LIZBETH CROUCH    Indications    Atrial fibrillation  unspecified type (H) [I48.91]  Chronic atrial fibrillation (H) (Resolved) [I48.20]  Paroxysmal atrial fibrillation (H) [I48.0]  Chronic anticoagulation [Z79.01]             Comments:  okay for 8-12 week checks per 2/10/23 message             Anticoagulation Care Providers       Provider Role Specialty Phone number    Drake Lucas MD Referring Internal Medicine [phone]

## 2025-03-04 NOTE — TELEPHONE ENCOUNTER
Order/Referral Request    Who is requesting: Jenn    Orders being requested: lab orders for AWV    Reason service is needed/diagnosis: screening    When are orders needed by: Lab appointment is scheduled on 4/15/25    Has this been discussed with Provider: N/A    Does patient have a preference on a Group/Provider/Facility? [facility]    Does patient have an appointment scheduled?: Yes: 4/15/25 for lab appointment and then 4/24/25 for her AWV with Dr. Lucas    Where to send orders: Place orders within Epic    Could we send this information to you in [facility] or would you prefer to receive a phone call?:   Patient would prefer a phone call   Okay to leave a detailed message?: Yes at Cell number on file:    Telephone Information:   Mobile [phone]     Martha Sun   [facility]  Central Scheduler

## 2025-03-13 NOTE — TELEPHONE ENCOUNTER
[facility]  Colon and Rectal Surgery Consult Note  Name: Marybel Redd    MRN: [id number]  YOB: 1991    Age: 33 year old  Date of admission: 3/13/2025  Primary care provider: No Ref-Primary, Physician     Requesting Physician:  Dr. Nicole  Reason for consult:  SBO, Crohn's disease           History of Present Illness:   Marybel Redd is a 33 year old female with a history of Crohn's disease, previous SBO, HTN, seen at the request of Dr. Nicole, who presents with abdominal pain and vomiting.  She developed left upper quadrant abdominal pain yesterday that has gotten progressively worse.  Overnight she had multiple episodes of emesis that were dark/black. She presented to the ED for evaluation.  Initial vitals were notable for mild tachycardia otherwise normal.  Labs were notable for WBC 13.1 otherwise unremarkable.  CT abdomen/pelvis shows a small bowel obstruction with a transition point in the right lower quadrant due to a 6.2 cm segment of inflammatory/Crohn's stricture.  Just proximal to the transition point/stricture there is a small posterior 0.6 cm mucosal polyp.  General surgery was contacted by the ED, Dr. Nicole recommended NGT placement for decompression and CRS consult given her history of Crohn's disease.    She follows with [facility] for her Crohn's. She is on Stelara.  She has never required surgery nor has seen a colorectal surgeon.  She has had a previous SBO and previous Crohn's flares have been managed with steroids. She is not currently on steroids. She has been admitted for SBO on two occasions, in 2020 and 2022. Today, she reports her pain is slightly improved with pain medications. She last had a bowel movement at 2am. She has not been passing gas. She presents with her  at bedside. She has never had any children but is hoping to in the future.      Colonoscopy History: Last done 11/2022, Crohn's appears to be in remission on Stelara    Past abdominal  Noted.  Patient is scheduled for an INR on 11/13/24.    Stella Yang RN  [facility]      surgery: None            Past Medical History:     Past Medical History:   Diagnosis Date    Abnormal Pap smear of cervix 2016    Autoimmune disease 2012    Crohns    Crohn's disease (H) 04/08/2021    Hypertension 12/2020    Unsure it previously caused by steroid meds    SBO (small bowel obstruction) (H) 2020    Uncomplicated asthma     Used inhaler in childhood, no issues as adult             Past Surgical History:     Past Surgical History:   Procedure Laterality Date    COLONOSCOPY      COLONOSCOPY N/A 11/30/2022    Procedure: COLONOSCOPY, WITH BIOPSY;  Surgeon: Shiv Jones MD;  Location: [facility] OR    EGD      LA ABLATE HEART DYSRHYTHM FOCUS  1993               Social History:     Social History     Tobacco Use    Smoking status: Never    Smokeless tobacco: Never   Substance Use Topics    Alcohol use: Yes     Comment: occasional             Family History:     Family History   Problem Relation Age of Onset    Other - See Comments Mother         Hysterectomy    Hypertension Father     Cerebrovascular Disease Maternal Grandmother 50    Colon Cancer Maternal Grandfather         60s    Melanoma Maternal Grandfather     Diabetes Type 1 Paternal Grandmother     Other - See Comments Paternal Grandmother         uterine prolapse    Heart Disease Paternal Grandfather 50        Heart attack    Hypertension Cousin              Allergies:     Allergies   Allergen Reactions    Cats Cough    Other Environmental Allergy Itching and Visual Disturbance             Medications:     Current Facility-Administered Medications   Medication Dose Route Frequency Provider Last Rate Last Admin             Review of Systems:   A comprehensive greater than 10 system review of systems was carried out.  Pertinent positives and negatives are noted above.  Otherwise negative for contributory info.            Physical Exam:     Blood pressure (!) 137/94, pulse 105, temperature 98.3  F (36.8  C), temperature source Oral, resp. rate 16,  last menstrual period 03/08/2025, SpO2 98%, not currently breastfeeding.  No intake or output data in the 24 hours ending 03/13/25 1203  Exam:  General - Awake alert and oriented, appears stated age  Pulm - Non-labored breathing with normal respiratory effort  CVS - reg rate and rhythm, no peripheral edema  Abd - Soft, moderately tender, non-distended.  No guarding, rigidity or peritoneal signs. NGT in place  Neuro - CN II-XII grossly intact  Musculoskeletal - extremities with no clubbing, cyanosis or edema; able to ambulate  Psych - responsive, alert, cooperative; oriented x3; appropriate mood and affect  External/skin - inspection reveals no rashes, lesions or ulcers, normal coloring             Data Reviewed:     Recent Results (from the past 24 hours)   CT Abdomen Pelvis w Contrast    Narrative    EXAM: CT ABDOMEN PELVIS W CONTRAST  LOCATION: [facility]  DATE: 3/13/2025    INDICATION: Left upper quadrant abdominal pain. Concern for Crohn's flare for abscess versus GI bleed. Describes vomiting black emesis.  COMPARISON: None.  TECHNIQUE: CT scan of the abdomen and pelvis was performed following injection of IV contrast. Multiplanar reformats were obtained. Dose reduction techniques were used.  CONTRAST: 71 mL Isovue 370    FINDINGS:   LOWER CHEST: Normal.    HEPATOBILIARY: Normal.    PANCREAS: Normal.    SPLEEN: Normal.    ADRENAL GLANDS: Normal.    KIDNEYS/BLADDER: Normal.    BOWEL: Proximal/mid dilated bowel measuring up to 4.9 cm with trace interloop stranding with transition point in the right lower quadrant where there is luminal narrowing, moderate wall thickening, and mild mural hyperenhancement (spanning approximately   6.2 cm). Small posterior mucosal polyp measuring 0.6 cm just proximal to the transition point (3/127). Distal small bowel is decompressed.    LYMPH NODES: Unremarkable.    VASCULATURE: Normal.    PELVIC ORGANS: Left Bartholin's gland cyst measuring 1.4 cm. Uterine  fibroid measuring up to 7.7 cm. Trace pelvic free fluid. Probable left ovarian functional cyst.    MUSCULOSKELETAL: Unremarkable.      Impression    IMPRESSION:   1.  Proximal/mid small bowel obstruction with transition point in the right lower quadrant due to inflammatory/Crohn's stricture.   2.  Subcentimeter small bowel posterior mucosal polyp just proximal to the transition point may be inflammatory, although consider attention on follow-up to exclude underlying polyp/mass.  3.  Additional findings as in the body of the report.       Recent Labs   Lab 03/13/25  0857   WBC 13.1*   HGB 15.7   HCT 45.0   MCV 87        Recent Labs   Lab 03/13/25  0857      POTASSIUM 4.1   CHLORIDE 103   CO2 24   ANIONGAP 10   *   BUN 11.7   CR 0.57   GFRESTIMATED >90   DALE 9.8   PROTTOTAL 7.4   ALBUMIN 4.5   BILITOTAL 0.4   ALKPHOS 50   AST 20   ALT 13         Assessment and Plan:   Marybel Redd is a 33 year old female with a history of Crohn's disease managed with Stelara, who presented with left-sided abdominal pain and nausea/vomiting.  Workup in the ED reveals mild leukocytosis and CT findings consistent with a small bowel obstruction secondary to an inflammatory/Crohn's stricture. She is hemodynamically stable. On exam, she is mildly tender but not peritonitic. NGT was placed at bedside. Her abdominal pain is improved with pain medications.    Continue with conservative management with NGT decompression and bowel rest. No indication for urgent surgery at this time. However, due to the inflammation in the small bowel causing this stricturing and recurrent obstructions, there is a possible this current may not resolve with conservative measures alone. We did discuss that surgical intervention in the future is warranted but would obviously prefer to do this on a semi-elective basis. If her pain and nausea persist or worsen, she may need operative intervention during this admission. This would entail a  small bowel resection and a 5-7 day hospital stay. Regardless of operative timeline, diversion with creation of an ileostomy is unlikely. Will place GI consult for assistance in management of her Crohn's disease and initiation of sterids if warranted. CRS will continue to closely follow.    Plan:  Admit to medicine  Surgery: No indication for urgent surgery at this time.  Diet: NPO. Cont NGT to low intermittent suction  IV Fluids: continue  Antibiotics:  no indication  Medications:  per medicine  I&O s:  strict I&O s   Labs:   - Reviewed: Yes  - Ordered: none   Imaging:   - Dr. Stubbs and myself have personally viewed: CT abd/pelvis  - Ordered:  none  Activity:  OOB, ambulate as able  DVT prophylaxis: SCD s  This plan has been discussed with Dr. Stubbs    Patient specific identified risk factors considered as part of today s evaluation include: Crohn's on Stelara, HTN    Additional history obtained from patient at bedside.  Time spent on consultation: 50 minutes, greater than 50% spent on counseling and/or coordination of care.      Shannon Troncoso PA-C  Colorectal Physician Assistant    [facility]  [address] [facility] [address]  [place] [address]  T: [phone]  F: [phone]

## 2025-04-15 ENCOUNTER — LAB (OUTPATIENT)
Dept: LAB | Facility: CLINIC | Age: 80
End: 2025-04-15
Payer: COMMERCIAL

## 2025-04-15 ENCOUNTER — ANTICOAGULATION THERAPY VISIT (OUTPATIENT)
Dept: ANTICOAGULATION | Facility: CLINIC | Age: 80
End: 2025-04-15

## 2025-04-15 DIAGNOSIS — I48.91 ATRIAL FIBRILLATION, UNSPECIFIED TYPE (H): Primary | ICD-10-CM

## 2025-04-15 DIAGNOSIS — I48.0 PAROXYSMAL ATRIAL FIBRILLATION (H): ICD-10-CM

## 2025-04-15 DIAGNOSIS — Z79.01 CHRONIC ANTICOAGULATION: ICD-10-CM

## 2025-04-15 DIAGNOSIS — I10 HYPERTENSION GOAL BP (BLOOD PRESSURE) < 140/90: ICD-10-CM

## 2025-04-15 DIAGNOSIS — E78.5 HYPERLIPIDEMIA LDL GOAL <100: ICD-10-CM

## 2025-04-15 LAB
ALBUMIN SERPL BCG-MCNC: 4 G/DL (ref 3.5–5.2)
ALP SERPL-CCNC: 84 U/L (ref 40–150)
ALT SERPL W P-5'-P-CCNC: 18 U/L (ref 0–50)
ANION GAP SERPL CALCULATED.3IONS-SCNC: 9 MMOL/L (ref 7–15)
AST SERPL W P-5'-P-CCNC: 33 U/L (ref 0–45)
BASOPHILS # BLD AUTO: 0 10E3/UL (ref 0–0.2)
BASOPHILS NFR BLD AUTO: 0 %
BILIRUB DIRECT SERPL-MCNC: 0.2 MG/DL (ref 0–0.3)
BILIRUB SERPL-MCNC: 0.5 MG/DL
BUN SERPL-MCNC: 13.7 MG/DL (ref 8–23)
CALCIUM SERPL-MCNC: 10 MG/DL (ref 8.8–10.4)
CHLORIDE SERPL-SCNC: 104 MMOL/L (ref 98–107)
CHOLEST SERPL-MCNC: 184 MG/DL
CREAT SERPL-MCNC: 1.05 MG/DL (ref 0.51–0.95)
CREAT UR-MCNC: 45.9 MG/DL
EGFRCR SERPLBLD CKD-EPI 2021: 53 ML/MIN/1.73M2
EOSINOPHIL # BLD AUTO: 0.1 10E3/UL (ref 0–0.7)
EOSINOPHIL NFR BLD AUTO: 1 %
ERYTHROCYTE [DISTWIDTH] IN BLOOD BY AUTOMATED COUNT: 13.4 % (ref 10–15)
FASTING STATUS PATIENT QL REPORTED: NO
FASTING STATUS PATIENT QL REPORTED: NO
GLUCOSE SERPL-MCNC: 67 MG/DL (ref 70–99)
HCO3 SERPL-SCNC: 28 MMOL/L (ref 22–29)
HCT VFR BLD AUTO: 46.3 % (ref 35–47)
HDLC SERPL-MCNC: 54 MG/DL
HGB BLD-MCNC: 15.1 G/DL (ref 11.7–15.7)
IMM GRANULOCYTES # BLD: 0 10E3/UL
IMM GRANULOCYTES NFR BLD: 0 %
INR BLD: 2.1 (ref 0.9–1.1)
LDLC SERPL CALC-MCNC: 93 MG/DL
LYMPHOCYTES # BLD AUTO: 2 10E3/UL (ref 0.8–5.3)
LYMPHOCYTES NFR BLD AUTO: 28 %
MCH RBC QN AUTO: 28.7 PG (ref 26.5–33)
MCHC RBC AUTO-ENTMCNC: 32.6 G/DL (ref 31.5–36.5)
MCV RBC AUTO: 88 FL (ref 78–100)
MICROALBUMIN UR-MCNC: <12 MG/L
MICROALBUMIN/CREAT UR: NORMAL MG/G{CREAT}
MONOCYTES # BLD AUTO: 0.7 10E3/UL (ref 0–1.3)
MONOCYTES NFR BLD AUTO: 9 %
NEUTROPHILS # BLD AUTO: 4.4 10E3/UL (ref 1.6–8.3)
NEUTROPHILS NFR BLD AUTO: 61 %
NONHDLC SERPL-MCNC: 130 MG/DL
PLATELET # BLD AUTO: 181 10E3/UL (ref 150–450)
POTASSIUM SERPL-SCNC: 5.2 MMOL/L (ref 3.4–5.3)
PROT SERPL-MCNC: 7.1 G/DL (ref 6.4–8.3)
RBC # BLD AUTO: 5.26 10E6/UL (ref 3.8–5.2)
SODIUM SERPL-SCNC: 141 MMOL/L (ref 135–145)
TRIGL SERPL-MCNC: 187 MG/DL
WBC # BLD AUTO: 7.2 10E3/UL (ref 4–11)

## 2025-04-15 PROCEDURE — 85610 PROTHROMBIN TIME: CPT

## 2025-04-15 PROCEDURE — 82570 ASSAY OF URINE CREATININE: CPT

## 2025-04-15 PROCEDURE — 36415 COLL VENOUS BLD VENIPUNCTURE: CPT

## 2025-04-15 PROCEDURE — 82043 UR ALBUMIN QUANTITATIVE: CPT

## 2025-04-15 PROCEDURE — 82248 BILIRUBIN DIRECT: CPT

## 2025-04-15 PROCEDURE — 85025 COMPLETE CBC W/AUTO DIFF WBC: CPT

## 2025-04-15 PROCEDURE — 80061 LIPID PANEL: CPT

## 2025-04-15 PROCEDURE — 80053 COMPREHEN METABOLIC PANEL: CPT

## 2025-04-15 NOTE — PROGRESS NOTES
ANTICOAGULATION MANAGEMENT     Jenn Joseph 80 year old female is on warfarin with therapeutic INR result. (Goal INR 2.0-3.0)    Recent labs: (last 7 days)     04/15/25  1036   INR 2.1*       ASSESSMENT     Warfarin Lab Questionnaire    Warfarin Doses Last 7 Days      4/15/2025    10:24 AM   Dose in Tablet or Mg   TAB or MG? milligram (mg)     Pt Rptd Dose MECHE MONDAY TUESDAY WED THURS FRIDAY SATURDAY   4/15/2025  10:24 AM 1.5 1.5 1.5 1.5 1.5 1.5 1.5         4/15/2025   Warfarin Lab Questionnaire   Missed doses within past 14 days? No   Changes in diet or alcohol within past 14 days? No   Medication changes since last result? No   New shortness of breath, severe headaches or sudden changes in vision since last result? Yes Patient reports she has had shortness of breath for months, currently wearing Zio patch   If yes, please explain:  Shortness of breath  with A-Fib   Abnormal bleeding since last result? No   Upcoming surgery, procedure? No   Best number to call with results? [phone]     Previous result: Therapeutic last 2(+) visits  Additional findings: None       PLAN     Recommended plan for no diet, medication or health factor changes affecting INR     Dosing Instructions: Continue your current warfarin dose with next INR in 7 weeks       Summary  As of 4/15/2025      Full warfarin instructions:  1.5 mg every day   Next INR check:  6/3/2025               Telephone call with Jenn who verbalizes understanding and agrees to plan    Lab visit scheduled    Education provided: Please call back if any changes to your diet, medications or how you've been taking warfarin  Contact [phone] with any changes, questions or concerns.     Plan made per [facility] anticoagulation protocol    Antonia Ferguson, RN  4/15/2025  Anticoagulation Clinic  morphCARD for routing messages: alee CROUCH  [facility] patient phone line:  [phone]        _______________________________________________________________________     Anticoagulation Episode Summary       Current INR goal:  2.0-3.0   TTR:  88.5% (1 y)   Target end date:  Indefinite   Send INR reminders to:  ELIZABETH CROUCH    Indications    Atrial fibrillation  unspecified type (H) [I48.91]  Chronic atrial fibrillation (H) (Resolved) [I48.20]  Paroxysmal atrial fibrillation (H) [I48.0]  Chronic anticoagulation [Z79.01]             Comments:  okay for 8-12 week checks per 2/10/23 message             Anticoagulation Care Providers       Provider Role Specialty Phone number    Drake Lucas MD Referring Internal Medicine [phone]

## 2025-04-24 ENCOUNTER — OFFICE VISIT (OUTPATIENT)
Dept: FAMILY MEDICINE | Facility: CLINIC | Age: 80
End: 2025-04-24
Payer: COMMERCIAL

## 2025-04-24 DIAGNOSIS — Z00.00 ENCOUNTER FOR MEDICARE ANNUAL WELLNESS EXAM: Primary | ICD-10-CM

## 2025-04-24 DIAGNOSIS — N18.31 CKD STAGE 3A, GFR 45-59 ML/MIN (H): ICD-10-CM

## 2025-04-24 DIAGNOSIS — I10 HYPERTENSION GOAL BP (BLOOD PRESSURE) < 140/90: ICD-10-CM

## 2025-04-24 DIAGNOSIS — E78.2 MIXED HYPERLIPIDEMIA: ICD-10-CM

## 2025-04-24 DIAGNOSIS — I48.0 PAROXYSMAL ATRIAL FIBRILLATION (H): ICD-10-CM

## 2025-04-24 RX ORDER — PRAVASTATIN SODIUM 40 MG
40 TABLET ORAL DAILY
Qty: 90 TABLET | Refills: 3 | Status: SHIPPED | OUTPATIENT
Start: 2025-04-24

## 2025-04-24 RX ORDER — LISINOPRIL 5 MG/1
5 TABLET ORAL DAILY
Qty: 90 TABLET | Refills: 3 | Status: SHIPPED | OUTPATIENT
Start: 2025-04-24

## 2025-04-24 RX ORDER — ATENOLOL 50 MG/1
50 TABLET ORAL EVERY EVENING
COMMUNITY

## 2025-04-24 SDOH — HEALTH STABILITY: PHYSICAL HEALTH: ON AVERAGE, HOW MANY DAYS PER WEEK DO YOU ENGAGE IN MODERATE TO STRENUOUS EXERCISE (LIKE A BRISK WALK)?: 3 DAYS

## 2025-04-24 ASSESSMENT — SOCIAL DETERMINANTS OF HEALTH (SDOH): HOW OFTEN DO YOU GET TOGETHER WITH FRIENDS OR RELATIVES?: MORE THAN THREE TIMES A WEEK

## 2025-04-24 NOTE — PROGRESS NOTES
SUBJECTIVE:   Jenn is a 80 year old, presenting for the following:  Physical    Today's PHQ-2 Score:       4/24/2025     9:45 AM   PHQ-2 ( 1999 [employer])   Q1: Little interest or pleasure in doing things 0   Q2: Feeling down, depressed or hopeless 0   PHQ-2 Score 0    Q1: Little interest or pleasure in doing things Not at all   Q2: Feeling down, depressed or hopeless Not at all   PHQ-2 Score 0       Patient-reported     Updates:  -Chronic atrial fibrillation, S/P ablation, previously on Amiodarone and Sotalol. Occurs randomly.     Social History     Tobacco Use    Smoking status: Former     Passive exposure: Never    Smokeless tobacco: Never   Substance Use Topics    Alcohol use: Yes     Comment: 1 - 2 drinks PER WEEK         4/24/2025     9:43 AM   Alcohol Use   Prescreen: >3 drinks/day or >7 drinks/week? No     Reviewed orders with patient.  Reviewed health maintenance and updated orders accordingly - Yes  Labs reviewed in EPIC  BP Readings from Last 3 Encounters:   04/24/25 (!) 177/84   10/22/24 138/85   04/23/24 135/85    Wt Readings from Last 3 Encounters:   04/24/25 60.6 kg (133 lb 9.6 oz)   10/22/24 61.8 kg (136 lb 3.2 oz)   04/23/24 62.3 kg (137 lb 6.4 oz)                  Patient Active Problem List   Diagnosis    GERD (gastroesophageal reflux disease)    CARDIOVASCULAR SCREENING; LDL GOAL LESS THAN 130    Hypertension, goal below 140/90    Long-term (current) use of anticoagulants, INR goal 2.0-3.0    CKD (chronic kidney disease) stage 3, GFR 30-59 ml/min (H)    Seborrhea    Eyelid dermatitis, eczematous    Uterine prolapse    Paroxysmal atrial fibrillation (H)    Pure hypercholesterolemia    Atrial fibrillation, unspecified type (H)    Cystocele with uterine prolapse    Essential hypertension, benign    Hyperlipidemia LDL goal <100    Bilateral low back pain with left-sided sciatica    Sprain of lateral collateral ligament of left knee, initial encounter    Lateral meniscal pain, left     Chronic  anticoagulation     Past Surgical History:   Procedure Laterality Date    D & C  80'S    HYSTERECTOMY  11-5-15    ROTATOR CUFF REPAIR RT/LT  2007    LEFT    TUBAL LIGATION  80'S       Social History     Tobacco Use    Smoking status: Former     Passive exposure: Never    Smokeless tobacco: Never   Substance Use Topics    Alcohol use: Yes     Comment: 1 - 2 drinks PER WEEK     Family History   Problem Relation Age of Onset    Hypertension Mother     Cerebrovascular Disease Mother     Thyroid Disease Mother     Cerebrovascular Disease Maternal Grandfather     Hypertension Brother     Musculoskeletal Disorder Brother         FIBROMYALGIA    Heart Disease Brother     Cardiovascular Brother         ATRIAL FIB    Musculoskeletal Disorder Sister         FIBROMYALGIA    Thyroid Disease Sister     Allergies Son     Heart Disease Brother     Cardiovascular Brother     Heart Disease Brother     Cardiovascular Brother     Heart Disease Brother     Cardiovascular Brother     Heart Disease Father     Cardiovascular Brother         ATRIAL FIB    Cardiovascular Brother         ATRIAL FIB    Cardiovascular Brother         ATRIAL FIB    Cardiovascular Brother         ATRIAL FIB         Current Outpatient Medications   Medication Sig Dispense Refill    atenolol (TENORMIN) 50 MG tablet Take 50 mg by mouth every evening.      Calcium-Magnesium-Zinc 333-133-5 MG TABS Take by mouth 2 times daily       Cholecalciferol (VITAMIN D) 1000 UNITS capsule Take 1 capsule by mouth daily.      Coenzyme Q10 (CO Q-10) 200 MG CAPS Take 200 mg by mouth daily 90 capsule 3    famotidine (PEPCID) 20 MG tablet TAKE 1 TABLET (20 MG) BY MOUTH 2 TIMES DAILY WITH MEALS 180 tablet 1    lisinopril (ZESTRIL) 5 MG tablet Take 1 tablet (5 mg) by mouth daily 90 tablet 1    omeprazole (PRILOSEC) 20 MG DR capsule Take 1 capsule (20 mg) by mouth At Bedtime 90 capsule 0    pravastatin (PRAVACHOL) 40 MG tablet Take 1 tablet (40 mg) by mouth daily 90 tablet 0    probiotic  "CAPS Take by mouth. 1 tablet daily  Culturelle      vitamin B-12 (CYANOCOBALAMIN) 100 MCG tablet Take 500 mcg by mouth daily.      warfarin ANTICOAGULANT (COUMADIN) 1 MG tablet Take 1.5 mg every day or As directed by Anticoagulation clinic 135 tablet 1     Allergies   Allergen Reactions    Diflucan [Fluconazole] Rash     NOT EXACTLY SURE    Lamisil Rash    Terbinafine Rash     Breast Cancer Screening:    Mammogram Screening - Patient over age 75, has elected to continue with screening.  Pertinent mammograms are reviewed under the imaging tab.     Reviewed and updated as needed this visit by clinical staff   Tobacco  Allergies  Meds              Reviewed and updated as needed this visit by Provider     Meds              Review of Systems  CONSTITUTIONAL: NEGATIVE for fever, chills, change in weight  INTEGUMENTARY/SKIN: NEGATIVE for worrisome rashes, moles or lesions  EYES: NEGATIVE for vision changes or irritation  ENT/MOUTH: NEGATIVE for ear, mouth and throat problems  RESP: NEGATIVE for significant cough or SOB  BREAST: NEGATIVE for masses, tenderness or discharge  CV: NEGATIVE for chest pain/chest pressure and syncope or near-syncope  GI: POSITIVE for GERD, improving with dietary changes.  : NEGATIVE for frequency, dysuria, or hematuria  MUSCULOSKELETAL: NEGATIVE for significant arthralgias or myalgia  NEURO: NEGATIVE for weakness, dizziness or paresthesias  ENDOCRINE: NEGATIVE for temperature intolerance, skin/hair changes  HEME: NEGATIVE for bleeding problems  PSYCHIATRIC: NEGATIVE for changes in mood or affect    OBJECTIVE:   /80 (BP Location: Left arm, Patient Position: Sitting, Cuff Size: Adult Regular)   Pulse 63   Temp 96.9  F (36.1  C) (Temporal)   Resp 18   Ht 1.575 m (5' 2\")   Wt 60.6 kg (133 lb 9.6 oz)   SpO2 98%   BMI 24.44 kg/m      Estimated body mass index is 24.44 kg/m  as calculated from the following:    Height as of this encounter: 1.575 m (5' 2\").    Weight as of this " encounter: 60.6 kg (133 lb 9.6 oz).  Physical Exam  GENERAL: alert and no distress  EYES: Eyes grossly normal to inspection and conjunctivae and sclerae normal  HENT: normal cephalic/atraumatic and oral mucous membranes moist  NECK: no adenopathy, no asymmetry, masses, or scars  RESP: lungs clear to auscultation - no rales, rhonchi or wheezes  CV: regular rates and rhythm and no peripheral edema  ABDOMEN: soft, nontender, no hepatosplenomegaly, no masses and bowel sounds normal  MS: no gross musculoskeletal defects noted, no edema  NEURO: Normal strength and tone, mentation intact and speech normal  PSYCH: mentation appears normal, affect normal/bright    Diagnostic Test Results:  No results found for any visits on 04/24/25.    ASSESSMENT/PLAN:     Encounter for Medicare annual wellness exam  - REVIEW OF HEALTH MAINTENANCE PROTOCOL ORDERS    Paroxysmal atrial fibrillation (H)  - PRIMARY CARE FOLLOW-UP SCHEDULING  - REVIEW OF HEALTH MAINTENANCE PROTOCOL ORDERS    Mixed hyperlipidemia  - pravastatin (PRAVACHOL) 40 MG tablet; Take 1 tablet (40 mg) by mouth daily.  - REVIEW OF HEALTH MAINTENANCE PROTOCOL ORDERS    Hypertension goal BP (blood pressure) < 140/90  - lisinopril (ZESTRIL) 5 MG tablet; Take 1 tablet (5 mg) by mouth daily.  - REVIEW OF HEALTH MAINTENANCE PROTOCOL ORDERS    CKD stage 3a, GFR 45-59 ml/min (H)  - REVIEW OF HEALTH MAINTENANCE PROTOCOL ORDERS     Patient has been advised of split billing requirements and indicates understanding: Yes      Counseling  Special attention given to:        Regular exercise       Healthy diet/nutrition  Appropriate preventive services were addressed with this patient via screening, questionnaire, or discussion as appropriate for fall prevention, nutrition, physical activity, tobacco-use cessation, social engagement, weight loss and cognition. Checklist reviewing preventive services available has been given to the patient.        The ASCVD Risk score (Alyce DK, et al.,  2019) failed to calculate for the following reasons:    The 2019 ASCVD risk score is only valid for ages 40 to 79    She reports that she has quit smoking. She has never been exposed to tobacco smoke. She has never used smokeless tobacco.          Signed Electronically by: Drake Lucas MD

## 2025-04-24 NOTE — PATIENT INSTRUCTIONS
At [facility], we strive to deliver an exceptional experience to you, every time we see you. If you receive a survey, please let us know what we are doing well and/or what we could improve upon, as we do value your feedback.  If you have MyChart, you can expect to receive results automatically within 24 hours of their completion.  Your provider will send a note interpreting your results as well.   If you do not have MyChart, you should receive your results in about a week by mail.    Your care team:                            Family Medicine Internal Medicine   MD Drake Denise, MD Kizzy Jefferson, MD Pepito Green, MD Chelly Espinoza, PA-C    Reji Musa, MD Pediatrics   Kathya Carrera, MD Rebecca Bernstein, CHOLO Galindo CNP Marcela Kerr, MD Anupama Aleman, MD Nikkie Crews, CNP     Alejandrina Malhotra, PA-C Same-Day Provider (No follow-up visits)   CHOLO Burns, JUAN CARLOS Madrigal, PA-C    Rebeka Eaton PA-C     Clinic hours: Monday - Thursday 7 am-6 pm; Fridays 7 am-5 pm.   Urgent care: Monday - Friday 10 am- 8 pm; Saturday and Sunday 9 am-5 pm.    Clinic: [phone]       [facility]: Monday - Thursday 8 am - 7 pm; Friday 8 am - 6 pm  [facility]: [phone]

## 2025-05-04 VITALS
DIASTOLIC BLOOD PRESSURE: 80 MMHG | WEIGHT: 133.6 LBS | RESPIRATION RATE: 18 BRPM | HEIGHT: 62 IN | HEART RATE: 63 BPM | TEMPERATURE: 96.9 F | OXYGEN SATURATION: 98 % | SYSTOLIC BLOOD PRESSURE: 135 MMHG | BODY MASS INDEX: 24.59 KG/M2

## 2025-06-03 ENCOUNTER — ANTICOAGULATION THERAPY VISIT (OUTPATIENT)
Dept: ANTICOAGULATION | Facility: CLINIC | Age: 80
End: 2025-06-03

## 2025-06-03 ENCOUNTER — RESULTS FOLLOW-UP (OUTPATIENT)
Dept: NURSING | Facility: CLINIC | Age: 80
End: 2025-06-03

## 2025-06-03 ENCOUNTER — LAB (OUTPATIENT)
Dept: LAB | Facility: CLINIC | Age: 80
End: 2025-06-03
Payer: COMMERCIAL

## 2025-06-03 DIAGNOSIS — Z79.01 CHRONIC ANTICOAGULATION: ICD-10-CM

## 2025-06-03 DIAGNOSIS — I48.0 PAROXYSMAL ATRIAL FIBRILLATION (H): ICD-10-CM

## 2025-06-03 DIAGNOSIS — I48.91 ATRIAL FIBRILLATION, UNSPECIFIED TYPE (H): Primary | ICD-10-CM

## 2025-06-03 LAB — INR BLD: 1.6 (ref 0.9–1.1)

## 2025-06-03 PROCEDURE — 85610 PROTHROMBIN TIME: CPT

## 2025-06-03 PROCEDURE — 36416 COLLJ CAPILLARY BLOOD SPEC: CPT

## 2025-06-03 NOTE — PROGRESS NOTES
ANTICOAGULATION MANAGEMENT     Jenn Joseph 80 year old female is on warfarin with subtherapeutic INR result. (Goal INR 2.0-3.0)    Recent labs: (last 7 days)     06/03/25  1024   INR 1.6*       ASSESSMENT     Source(s): Chart Review and Patient/Caregiver Call     Warfarin doses taken: Warfarin taken as instructed  Diet: Increased greens/vitamin K in diet; plans to resume previous intake  Medication/supplement changes: Atenolol dose was increased  New illness, injury, or hospitalization: No  Signs or symptoms of bleeding or clotting: Yes: had a bloody nose this week  Previous result: Therapeutic last 2(+) visits  Additional findings: None       PLAN     Recommended plan for temporary change(s) affecting INR     Dosing Instructions: booster dose then continue your current warfarin dose with next INR in 2 weeks       Summary  As of 6/3/2025      Full warfarin instructions:  6/3: 2 mg; Otherwise 1.5 mg every day   Next INR check:  6/17/2025               Telephone call with Jenn who verbalizes understanding and agrees to plan    Lab visit scheduled    Education provided: Goal range and lab monitoring: goal range and significance of current result    Plan made per [facility] anticoagulation protocol    Marisela Josue, RN  6/3/2025  Anticoagulation Clinic  [employer] for routing messages: laee CROUCH  [facility] patient phone line: [phone]        _______________________________________________________________________     Anticoagulation Episode Summary       Current INR goal:  2.0-3.0   TTR:  77.8% (1 y)   Target end date:  Indefinite   Send INR reminders to:  ELIZABETH CROUCH    Indications    Atrial fibrillation  unspecified type (H) [I48.91]  Chronic atrial fibrillation (H) (Resolved) [I48.20]  Paroxysmal atrial fibrillation (H) [I48.0]  Chronic anticoagulation [Z79.01]             Comments:  okay for 8-12 week checks per 2/10/23 message             Anticoagulation Care Providers       Provider Role  Specialty Phone number    Vocal, Drake Lam MD Referring Internal Medicine [phone]

## 2025-06-17 ENCOUNTER — LAB (OUTPATIENT)
Dept: LAB | Facility: CLINIC | Age: 80
End: 2025-06-17
Payer: COMMERCIAL

## 2025-06-17 ENCOUNTER — ANTICOAGULATION THERAPY VISIT (OUTPATIENT)
Dept: ANTICOAGULATION | Facility: CLINIC | Age: 80
End: 2025-06-17

## 2025-06-17 ENCOUNTER — RESULTS FOLLOW-UP (OUTPATIENT)
Dept: NURSING | Facility: CLINIC | Age: 80
End: 2025-06-17

## 2025-06-17 DIAGNOSIS — I48.91 ATRIAL FIBRILLATION, UNSPECIFIED TYPE (H): Primary | ICD-10-CM

## 2025-06-17 DIAGNOSIS — Z79.01 CHRONIC ANTICOAGULATION: ICD-10-CM

## 2025-06-17 DIAGNOSIS — I48.0 PAROXYSMAL ATRIAL FIBRILLATION (H): ICD-10-CM

## 2025-06-17 LAB — INR BLD: 2.2 (ref 0.9–1.1)

## 2025-06-17 PROCEDURE — 36416 COLLJ CAPILLARY BLOOD SPEC: CPT

## 2025-06-17 PROCEDURE — 85610 PROTHROMBIN TIME: CPT

## 2025-06-17 NOTE — PROGRESS NOTES
ANTICOAGULATION MANAGEMENT     Jenn Joseph 80 year old female is on warfarin with therapeutic INR result. (Goal INR 2.0-3.0)    Recent labs: (last 7 days)     06/17/25  1515   INR 2.2*       ASSESSMENT     Source(s): Chart Review and Patient/Caregiver Call     Warfarin doses taken: Warfarin taken as instructed  Diet: No new diet changes identified  Medication/supplement changes: None noted  New illness, injury, or hospitalization: No  Signs or symptoms of bleeding or clotting: No  Previous result: Subtherapeutic  Additional findings: None       PLAN     Recommended plan for no diet, medication or health factor changes affecting INR     Dosing Instructions: Continue your current warfarin dose with next INR in 3 weeks       Summary  As of 6/17/2025      Full warfarin instructions:  1.5 mg every day   Next INR check:  7/8/2025               Telephone call with Jenn who verbalizes understanding and agrees to plan    Lab visit scheduled    Education provided: Goal range and lab monitoring: goal range and significance of current result    Plan made per [facility] anticoagulation protocol    Marisela Josue RN  6/17/2025  Anticoagulation Clinic  [employer] for routing messages: alee CROUCH  [facility] patient phone line: [phone]        _______________________________________________________________________     Anticoagulation Episode Summary       Current INR goal:  2.0-3.0   TTR:  75.2% (1 y)   Target end date:  Indefinite   Send INR reminders to:  ELIZABETH CROUCH    Indications    Atrial fibrillation  unspecified type (H) [I48.91]  Chronic atrial fibrillation (H) (Resolved) [I48.20]  Paroxysmal atrial fibrillation (H) [I48.0]  Chronic anticoagulation [Z79.01]             Comments:  okay for 8-12 week checks per 2/10/23 message             Anticoagulation Care Providers       Provider Role Specialty Phone number    Drake Lucas MD Referring Internal Medicine [phone]

## 2025-06-23 DIAGNOSIS — I48.0 PAROXYSMAL ATRIAL FIBRILLATION (H): ICD-10-CM

## 2025-06-23 DIAGNOSIS — Z79.01 LONG-TERM (CURRENT) USE OF ANTICOAGULANTS, INR GOAL 2.0-3.0: ICD-10-CM

## 2025-06-24 RX ORDER — WARFARIN SODIUM 1 MG/1
TABLET ORAL
Qty: 135 TABLET | Refills: 1 | Status: SHIPPED | OUTPATIENT
Start: 2025-06-24

## 2025-06-24 NOTE — TELEPHONE ENCOUNTER
ANTICOAGULATION MANAGEMENT:  Medication Refill    Anticoagulation Summary  As of 6/17/2025      Warfarin maintenance plan:  1.5 mg (1 mg x 1.5) every day   Next INR check:  7/8/2025   Target end date:  Indefinite    Indications    Atrial fibrillation  unspecified type (H) [I48.91]  Chronic atrial fibrillation (H) (Resolved) [I48.20]  Paroxysmal atrial fibrillation (H) [I48.0]  Chronic anticoagulation [Z79.01]                 Anticoagulation Care Providers       Provider Role Specialty Phone number    Drake Lucas MD Referring Internal Medicine [phone]            Refill Criteria    Visit with referring provider/group: Meets criteria: visit within referring provider group in the last 15 months on 4/24/25    [facility] referral last signed: 01/02/2025; within last year:  Yes    Lab monitoring is up to date (not exceeding 2 weeks overdue): Yes    Jenn meets all criteria for refill. Rx instructions and quantity match patient's current dosing plan. Warfarin 90 day supply with 1 refill granted per [facility] protocol     Marisela Josue RN  Anticoagulation Clinic

## 2025-07-08 ENCOUNTER — ANTICOAGULATION THERAPY VISIT (OUTPATIENT)
Dept: ANTICOAGULATION | Facility: CLINIC | Age: 80
End: 2025-07-08

## 2025-07-08 ENCOUNTER — LAB (OUTPATIENT)
Dept: LAB | Facility: CLINIC | Age: 80
End: 2025-07-08
Payer: COMMERCIAL

## 2025-07-08 ENCOUNTER — RESULTS FOLLOW-UP (OUTPATIENT)
Dept: NURSING | Facility: CLINIC | Age: 80
End: 2025-07-08

## 2025-07-08 DIAGNOSIS — I48.91 ATRIAL FIBRILLATION, UNSPECIFIED TYPE (H): Primary | ICD-10-CM

## 2025-07-08 DIAGNOSIS — Z79.01 CHRONIC ANTICOAGULATION: ICD-10-CM

## 2025-07-08 DIAGNOSIS — I48.0 PAROXYSMAL ATRIAL FIBRILLATION (H): ICD-10-CM

## 2025-07-08 LAB — INR BLD: 1.8 (ref 0.9–1.1)

## 2025-07-08 PROCEDURE — 85610 PROTHROMBIN TIME: CPT

## 2025-07-08 PROCEDURE — 36416 COLLJ CAPILLARY BLOOD SPEC: CPT

## 2025-07-08 NOTE — PROGRESS NOTES
ANTICOAGULATION MANAGEMENT     Jenn E Jake 80 year old female is on warfarin with subtherapeutic INR result. (Goal INR 2.0-3.0)    Recent labs: (last 7 days)     07/08/25  1045   INR 1.8*       ASSESSMENT     Source(s): Chart Review and Patient/Caregiver Call     Warfarin doses taken: Warfarin taken as instructed  Diet: No new diet changes identified  Medication/supplement changes: None noted  New illness, injury, or hospitalization: No  Signs or symptoms of bleeding or clotting: No  Previous result: Therapeutic last visit; previously outside of goal range  Additional findings: None       PLAN     Recommended plan for no diet, medication or health factor changes affecting INR     Dosing Instructions: Increase your warfarin dose (4.8% change) with next INR in 2 weeks       Summary  As of 7/8/2025      Full warfarin instructions:  2 mg every Tue; 1.5 mg all other days   Next INR check:  7/23/2025               Telephone call with Jenn who verbalizes understanding and agrees to plan    Lab visit scheduled    Education provided: Goal range and lab monitoring: goal range and significance of current result  Dietary considerations: impact of vitamin K foods on INR    Plan made per [facility] anticoagulation protocol    Marisela Josue, RN  7/8/2025  Anticoagulation Clinic  [employer] for routing messages: alee CROUCH  [facility] patient phone line: [phone]        _______________________________________________________________________     Anticoagulation Episode Summary       Current INR goal:  2.0-3.0   TTR:  72.4% (1 y)   Target end date:  Indefinite   Send INR reminders to:  ELIZABETH CROUCH    Indications    Atrial fibrillation  unspecified type (H) [I48.91]  Chronic atrial fibrillation (H) (Resolved) [I48.20]  Paroxysmal atrial fibrillation (H) [I48.0]  Chronic anticoagulation [Z79.01]             Comments:  okay for 8-12 week checks per 2/10/23 message             Anticoagulation Care Providers        Provider Role Specialty Phone number    Drake Lucas MD Referring Internal Medicine [phone]

## 2025-07-23 ENCOUNTER — LAB (OUTPATIENT)
Dept: LAB | Facility: CLINIC | Age: 80
End: 2025-07-23
Payer: COMMERCIAL

## 2025-07-23 ENCOUNTER — ANTICOAGULATION THERAPY VISIT (OUTPATIENT)
Dept: ANTICOAGULATION | Facility: CLINIC | Age: 80
End: 2025-07-23

## 2025-07-23 DIAGNOSIS — I48.91 ATRIAL FIBRILLATION, UNSPECIFIED TYPE (H): Primary | ICD-10-CM

## 2025-07-23 DIAGNOSIS — Z79.01 CHRONIC ANTICOAGULATION: ICD-10-CM

## 2025-07-23 DIAGNOSIS — I48.0 PAROXYSMAL ATRIAL FIBRILLATION (H): ICD-10-CM

## 2025-07-23 LAB — INR BLD: 1.9 (ref 0.9–1.1)

## 2025-07-23 PROCEDURE — 36415 COLL VENOUS BLD VENIPUNCTURE: CPT

## 2025-07-23 PROCEDURE — 85610 PROTHROMBIN TIME: CPT

## 2025-07-23 NOTE — PROGRESS NOTES
ANTICOAGULATION MANAGEMENT     Jenn Joseph 80 year old female is on warfarin with subtherapeutic INR result. (Goal INR 2.0-3.0)    Recent labs: (last 7 days)     07/23/25  0956   INR 1.9*       ASSESSMENT     Warfarin Lab Questionnaire    Warfarin Doses Last 7 Days      7/23/2025     9:58 AM   Dose in Tablet or Mg   TAB or MG? tablet (tab)     Pt Rptd Dose SUNDAY MONDAY TUESDAY WED THURS FRIDAY SATURDAY 7/23/2025   9:58 AM 1.5 1.5 1.5 1.5 1.5 1.5 1.5         7/23/2025   Warfarin Lab Questionnaire   Missed doses within past 14 days? No   Changes in diet or alcohol within past 14 days? No   Medication changes since last result? No   Injuries or illness since last result? No   New shortness of breath, severe headaches or sudden changes in vision since last result? No   Abnormal bleeding since last result? No   Upcoming surgery, procedure? No   Best number to call with results? [phone]     Previous result: Subtherapeutic  Additional findings: pt did not increase maintenance dose     PLAN     Recommended plan for temporary change(s) affecting INR     Dosing Instructions: pt did not adjust maintenance dose (pt will increase maintenance dose to 11 mg/wk starting today - adjusted maintenance dose to 2 mg on Wed instead of Tuesday to start dose today) with next INR in 2 weeks       Summary  As of 7/23/2025      Full warfarin instructions:  2 mg every Wed; 1.5 mg all other days   Next INR check:  8/6/2025               Telephone call with Jenn who verbalizes understanding and agrees to plan    Lab visit scheduled    Education provided: Please call back if any changes to your diet, medications or how you've been taking warfarin  Symptom monitoring: monitoring for clotting signs and symptoms, monitoring for stroke signs and symptoms, and when to seek medical attention/emergency care    Plan made per [facility] anticoagulation protocol    Miranda Abreu RN  7/23/2025  Anticoagulation Clinic  [facility] for routing  messages: alee CROUCH  [facility] patient phone line: [phone]        _______________________________________________________________________     Anticoagulation Episode Summary       Current INR goal:  2.0-3.0   TTR:  68.3% (1 y)   Target end date:  Indefinite   Send INR reminders to:  ELIZABETH CROUCH    Indications    Atrial fibrillation  unspecified type (H) [I48.91]  Chronic atrial fibrillation (H) (Resolved) [I48.20]  Paroxysmal atrial fibrillation (H) [I48.0]  Chronic anticoagulation [Z79.01]             Comments:  okay for 8-12 week checks per 2/10/23 message             Anticoagulation Care Providers       Provider Role Specialty Phone number    Drake Lucas MD Referring Internal Medicine [phone]

## 2025-08-06 ENCOUNTER — ANTICOAGULATION THERAPY VISIT (OUTPATIENT)
Dept: ANTICOAGULATION | Facility: CLINIC | Age: 80
End: 2025-08-06

## 2025-08-06 ENCOUNTER — LAB (OUTPATIENT)
Dept: LAB | Facility: CLINIC | Age: 80
End: 2025-08-06
Payer: COMMERCIAL

## 2025-08-06 DIAGNOSIS — Z79.01 CHRONIC ANTICOAGULATION: ICD-10-CM

## 2025-08-06 DIAGNOSIS — I48.0 PAROXYSMAL ATRIAL FIBRILLATION (H): ICD-10-CM

## 2025-08-06 DIAGNOSIS — I48.91 ATRIAL FIBRILLATION, UNSPECIFIED TYPE (H): Primary | ICD-10-CM

## 2025-08-06 LAB — INR BLD: 1.9 (ref 0.9–1.1)

## 2025-08-06 PROCEDURE — 36416 COLLJ CAPILLARY BLOOD SPEC: CPT

## 2025-08-06 PROCEDURE — 85610 PROTHROMBIN TIME: CPT

## 2025-08-20 ENCOUNTER — LAB (OUTPATIENT)
Dept: LAB | Facility: CLINIC | Age: 80
End: 2025-08-20
Payer: COMMERCIAL

## 2025-08-20 ENCOUNTER — TELEPHONE (OUTPATIENT)
Dept: ANTICOAGULATION | Facility: OTHER | Age: 80
End: 2025-08-20

## 2025-08-20 ENCOUNTER — RESULTS FOLLOW-UP (OUTPATIENT)
Dept: NURSING | Facility: CLINIC | Age: 80
End: 2025-08-20

## 2025-08-20 ENCOUNTER — ANTICOAGULATION THERAPY VISIT (OUTPATIENT)
Dept: ANTICOAGULATION | Facility: CLINIC | Age: 80
End: 2025-08-20

## 2025-08-20 DIAGNOSIS — I48.91 ATRIAL FIBRILLATION, UNSPECIFIED TYPE (H): Primary | ICD-10-CM

## 2025-08-20 DIAGNOSIS — I48.0 PAROXYSMAL ATRIAL FIBRILLATION (H): ICD-10-CM

## 2025-08-20 DIAGNOSIS — Z79.01 CHRONIC ANTICOAGULATION: ICD-10-CM

## 2025-08-20 LAB — INR BLD: 1.9 (ref 0.9–1.1)

## 2025-08-20 PROCEDURE — 85610 PROTHROMBIN TIME: CPT

## 2025-08-20 PROCEDURE — 36416 COLLJ CAPILLARY BLOOD SPEC: CPT

## 2025-09-03 ENCOUNTER — LAB (OUTPATIENT)
Dept: LAB | Facility: CLINIC | Age: 80
End: 2025-09-03
Payer: COMMERCIAL

## 2025-09-03 ENCOUNTER — ANTICOAGULATION THERAPY VISIT (OUTPATIENT)
Dept: ANTICOAGULATION | Facility: CLINIC | Age: 80
End: 2025-09-03

## 2025-09-03 ENCOUNTER — RESULTS FOLLOW-UP (OUTPATIENT)
Dept: NURSING | Facility: CLINIC | Age: 80
End: 2025-09-03

## 2025-09-03 DIAGNOSIS — Z79.01 CHRONIC ANTICOAGULATION: ICD-10-CM

## 2025-09-03 DIAGNOSIS — I48.0 PAROXYSMAL ATRIAL FIBRILLATION (H): ICD-10-CM

## 2025-09-03 DIAGNOSIS — I48.91 ATRIAL FIBRILLATION, UNSPECIFIED TYPE (H): Primary | ICD-10-CM

## 2025-09-03 LAB — INR BLD: 2 (ref 0.9–1.1)

## 2025-09-03 PROCEDURE — 85610 PROTHROMBIN TIME: CPT

## 2025-09-03 PROCEDURE — 36416 COLLJ CAPILLARY BLOOD SPEC: CPT
